# Patient Record
Sex: FEMALE | Race: WHITE | Employment: OTHER | URBAN - METROPOLITAN AREA
[De-identification: names, ages, dates, MRNs, and addresses within clinical notes are randomized per-mention and may not be internally consistent; named-entity substitution may affect disease eponyms.]

---

## 2017-04-12 ENCOUNTER — HOSPITAL ENCOUNTER (OUTPATIENT)
Dept: WOUND CARE | Age: 77
Discharge: HOME | End: 2017-04-12
Payer: MEDICARE

## 2017-04-12 PROCEDURE — 99212 OFFICE O/P EST SF 10 MIN: CPT

## 2017-04-12 PROCEDURE — 29580 STRAPPING UNNA BOOT: CPT

## 2017-04-21 PROCEDURE — 29580 STRAPPING UNNA BOOT: CPT

## 2017-04-28 PROCEDURE — 29580 STRAPPING UNNA BOOT: CPT

## 2017-05-03 ENCOUNTER — HOSPITAL ENCOUNTER (OUTPATIENT)
Dept: WOUND CARE | Age: 77
Discharge: HOME | End: 2017-05-03
Payer: MEDICARE

## 2017-05-03 PROCEDURE — 99212 OFFICE O/P EST SF 10 MIN: CPT

## 2022-03-08 ENCOUNTER — APPOINTMENT (OUTPATIENT)
Dept: GENERAL RADIOLOGY | Age: 82
End: 2022-03-08
Attending: PHYSICIAN ASSISTANT
Payer: MEDICARE

## 2022-03-08 ENCOUNTER — HOSPITAL ENCOUNTER (EMERGENCY)
Age: 82
Discharge: HOME | End: 2022-03-08
Attending: EMERGENCY MEDICINE
Payer: MEDICARE

## 2022-03-08 VITALS
DIASTOLIC BLOOD PRESSURE: 93 MMHG | OXYGEN SATURATION: 95 % | HEIGHT: 67 IN | BODY MASS INDEX: 26.68 KG/M2 | WEIGHT: 170 LBS | SYSTOLIC BLOOD PRESSURE: 129 MMHG | HEART RATE: 120 BPM | TEMPERATURE: 98.1 F | RESPIRATION RATE: 26 BRPM

## 2022-03-08 DIAGNOSIS — I48.91 ATRIAL FIBRILLATION WITH RVR (HCC): Primary | ICD-10-CM

## 2022-03-08 LAB
ALBUMIN SERPL-MCNC: 3 G/DL (ref 3.4–5)
ALBUMIN/GLOB SERPL: 0.9 {RATIO} (ref 0.8–1.7)
ALP SERPL-CCNC: 111 U/L (ref 45–117)
ALT SERPL-CCNC: 69 U/L (ref 13–56)
ANION GAP SERPL CALC-SCNC: 6 MMOL/L (ref 3–18)
APPEARANCE UR: ABNORMAL
AST SERPL-CCNC: 26 U/L (ref 10–38)
BACTERIA URNS QL MICRO: ABNORMAL /HPF
BASOPHILS # BLD: 0 K/UL (ref 0–0.1)
BASOPHILS NFR BLD: 0 % (ref 0–2)
BILIRUB SERPL-MCNC: 0.4 MG/DL (ref 0.2–1)
BILIRUB UR QL: ABNORMAL
BNP SERPL-MCNC: 4252 PG/ML (ref 0–1800)
BUN SERPL-MCNC: 13 MG/DL (ref 7–18)
BUN/CREAT SERPL: 17 (ref 12–20)
CALCIUM SERPL-MCNC: 9.2 MG/DL (ref 8.5–10.1)
CAOX CRY URNS QL MICRO: ABNORMAL
CHLORIDE SERPL-SCNC: 107 MMOL/L (ref 100–111)
CO2 SERPL-SCNC: 27 MMOL/L (ref 21–32)
COLOR UR: ABNORMAL
CREAT SERPL-MCNC: 0.77 MG/DL (ref 0.6–1.3)
DIFFERENTIAL METHOD BLD: ABNORMAL
EOSINOPHIL # BLD: 0.2 K/UL (ref 0–0.4)
EOSINOPHIL NFR BLD: 2 % (ref 0–5)
EPITH CASTS URNS QL MICRO: ABNORMAL /LPF (ref 0–5)
ERYTHROCYTE [DISTWIDTH] IN BLOOD BY AUTOMATED COUNT: 13 % (ref 11.6–14.5)
GLOBULIN SER CALC-MCNC: 3.5 G/DL (ref 2–4)
GLUCOSE SERPL-MCNC: 115 MG/DL (ref 74–99)
GLUCOSE UR STRIP.AUTO-MCNC: NEGATIVE MG/DL
HCT VFR BLD AUTO: 34.9 % (ref 35–45)
HGB BLD-MCNC: 11.1 G/DL (ref 12–16)
HGB UR QL STRIP: NEGATIVE
IMM GRANULOCYTES # BLD AUTO: 0 K/UL (ref 0–0.04)
IMM GRANULOCYTES NFR BLD AUTO: 0 % (ref 0–0.5)
KETONES UR QL STRIP.AUTO: ABNORMAL MG/DL
LEUKOCYTE ESTERASE UR QL STRIP.AUTO: NEGATIVE
LYMPHOCYTES # BLD: 1.4 K/UL (ref 0.9–3.6)
LYMPHOCYTES NFR BLD: 15 % (ref 21–52)
MAGNESIUM SERPL-MCNC: 2.1 MG/DL (ref 1.6–2.6)
MCH RBC QN AUTO: 29.4 PG (ref 24–34)
MCHC RBC AUTO-ENTMCNC: 31.8 G/DL (ref 31–37)
MCV RBC AUTO: 92.3 FL (ref 78–100)
MONOCYTES # BLD: 0.6 K/UL (ref 0.05–1.2)
MONOCYTES NFR BLD: 7 % (ref 3–10)
NEUTS SEG # BLD: 7 K/UL (ref 1.8–8)
NEUTS SEG NFR BLD: 75 % (ref 40–73)
NITRITE UR QL STRIP.AUTO: NEGATIVE
NRBC # BLD: 0 K/UL (ref 0–0.01)
NRBC BLD-RTO: 0 PER 100 WBC
PH UR STRIP: 5.5 [PH] (ref 5–8)
PLATELET # BLD AUTO: 423 K/UL (ref 135–420)
PMV BLD AUTO: 9.8 FL (ref 9.2–11.8)
POTASSIUM SERPL-SCNC: 3.9 MMOL/L (ref 3.5–5.5)
PROT SERPL-MCNC: 6.5 G/DL (ref 6.4–8.2)
PROT UR STRIP-MCNC: 30 MG/DL
RBC # BLD AUTO: 3.78 M/UL (ref 4.2–5.3)
RBC #/AREA URNS HPF: ABNORMAL /HPF (ref 0–5)
SODIUM SERPL-SCNC: 140 MMOL/L (ref 136–145)
SP GR UR REFRACTOMETRY: 1.03 (ref 1–1.03)
TROPONIN-HIGH SENSITIVITY: 20 NG/L (ref 0–54)
TROPONIN-HIGH SENSITIVITY: 24 NG/L (ref 0–54)
TROPONIN-HIGH SENSITIVITY: 24 NG/L (ref 0–54)
TSH SERPL DL<=0.05 MIU/L-ACNC: 2.19 UIU/ML (ref 0.36–3.74)
UROBILINOGEN UR QL STRIP.AUTO: 1 EU/DL (ref 0.2–1)
WBC # BLD AUTO: 9.3 K/UL (ref 4.6–13.2)
WBC URNS QL MICRO: ABNORMAL /HPF (ref 0–5)

## 2022-03-08 PROCEDURE — 85025 COMPLETE CBC W/AUTO DIFF WBC: CPT

## 2022-03-08 PROCEDURE — 99285 EMERGENCY DEPT VISIT HI MDM: CPT

## 2022-03-08 PROCEDURE — 84443 ASSAY THYROID STIM HORMONE: CPT

## 2022-03-08 PROCEDURE — 96374 THER/PROPH/DIAG INJ IV PUSH: CPT

## 2022-03-08 PROCEDURE — 83880 ASSAY OF NATRIURETIC PEPTIDE: CPT

## 2022-03-08 PROCEDURE — 96375 TX/PRO/DX INJ NEW DRUG ADDON: CPT

## 2022-03-08 PROCEDURE — 80053 COMPREHEN METABOLIC PANEL: CPT

## 2022-03-08 PROCEDURE — 93005 ELECTROCARDIOGRAM TRACING: CPT

## 2022-03-08 PROCEDURE — 81001 URINALYSIS AUTO W/SCOPE: CPT

## 2022-03-08 PROCEDURE — 74011250637 HC RX REV CODE- 250/637: Performed by: PHYSICIAN ASSISTANT

## 2022-03-08 PROCEDURE — 84484 ASSAY OF TROPONIN QUANT: CPT

## 2022-03-08 PROCEDURE — 71045 X-RAY EXAM CHEST 1 VIEW: CPT

## 2022-03-08 PROCEDURE — 74011000250 HC RX REV CODE- 250: Performed by: PHYSICIAN ASSISTANT

## 2022-03-08 PROCEDURE — 83735 ASSAY OF MAGNESIUM: CPT

## 2022-03-08 RX ORDER — METOPROLOL TARTRATE 100 MG/1
100 TABLET ORAL 2 TIMES DAILY
Qty: 60 TABLET | Refills: 0 | Status: SHIPPED | OUTPATIENT
Start: 2022-03-08 | End: 2022-04-07

## 2022-03-08 RX ORDER — DILTIAZEM HYDROCHLORIDE 5 MG/ML
10 INJECTION INTRAVENOUS
Status: COMPLETED | OUTPATIENT
Start: 2022-03-08 | End: 2022-03-08

## 2022-03-08 RX ORDER — METOPROLOL TARTRATE 5 MG/5ML
2.5 INJECTION INTRAVENOUS ONCE
Status: COMPLETED | OUTPATIENT
Start: 2022-03-08 | End: 2022-03-08

## 2022-03-08 RX ORDER — METOPROLOL TARTRATE 50 MG/1
100 TABLET ORAL EVERY 12 HOURS
Status: DISCONTINUED | OUTPATIENT
Start: 2022-03-08 | End: 2022-03-08 | Stop reason: HOSPADM

## 2022-03-08 RX ORDER — GUAIFENESIN 100 MG/5ML
81 LIQUID (ML) ORAL DAILY
COMMUNITY

## 2022-03-08 RX ADMIN — APIXABAN 5 MG: 5 TABLET, FILM COATED ORAL at 20:10

## 2022-03-08 RX ADMIN — DILTIAZEM HYDROCHLORIDE 10 MG: 5 INJECTION INTRAVENOUS at 17:22

## 2022-03-08 RX ADMIN — METOPROLOL TARTRATE 100 MG: 50 TABLET, FILM COATED ORAL at 20:03

## 2022-03-08 RX ADMIN — METOPROLOL TARTRATE 2.5 MG: 5 INJECTION INTRAVENOUS at 18:17

## 2022-03-08 NOTE — ED PROVIDER NOTES
EMERGENCY DEPARTMENT HISTORY AND PHYSICAL EXAM    Date: 3/8/2022  Patient Name: Otilia Marshall    History of Presenting Illness     Chief Complaint   Patient presents with    Irregular Heart Beat       History Provided By: Patient and Patient's Daughter    Additional History (Context):   Otilia Marshall is a 78 y.o. female resents to the emergency room with complaints of irregular heartbeat. Patient with a history of recently diagnosed atrial fibrillation with RVR. This was discovered during hospitalization while at [facility] February 26, 2022. She was admitted for elevated troponin, sepsis secondary to urinary tract infection. Patient was discharged home on metoprolol 75 mg twice daily. Aspirin for anticoagulation. She was not placed on anticoagulation due to history of falls. There was some discussion at that time about outpatient watchman device evaluation. Patient is followed by cardiologist Dr. Rick Hamilton. Patient underwent thorough work-up while hospitalized on her heart. Patient denies any chest pain. She does have some shortness of breath mostly noted with exertion. No swelling in her legs. Patient does have a history of thyroid issues. Denies any history of electrolyte imbalances. PCP: Alix Fritz MD    Current Outpatient Medications   Medication Sig Dispense Refill    aspirin 81 mg chewable tablet Take 81 mg by mouth daily.  metoprolol tartrate (LOPRESSOR) 100 mg IR tablet Take 1 Tablet by mouth two (2) times a day for 30 days. 60 Tablet 0    apixaban (Eliquis) 5 mg tablet Take 1 Tablet by mouth two (2) times a day for 30 days. 60 Tablet 0       Past History     Past Medical History:  Past Medical History:   Diagnosis Date    Atrial fibrillation ([address]     Hypothyroidism        Past Surgical History:  Past Surgical History:   Procedure Laterality Date    HX HYSTERECTOMY      HX KNEE REPLACEMENT         Family History:  History reviewed.  No pertinent family history. Social History:  Social History     Tobacco Use    Smoking status: Never Smoker    Smokeless tobacco: Never Used   Substance Use Topics    Alcohol use: Not Currently    Drug use: Never       Allergies: Allergies   Allergen Reactions    Penicillins Hives         Review of Systems   Review of Systems   Constitutional: Negative for chills, fatigue and fever. Respiratory: Positive for shortness of breath. Negative for choking, chest tightness and wheezing. Cardiovascular: Positive for palpitations. Negative for chest pain and leg swelling. Gastrointestinal: Negative for abdominal pain, nausea and vomiting. Neurological: Negative for dizziness, syncope, speech difficulty, weakness, light-headedness, numbness and headaches. All other systems reviewed and are negative. Physical Exam     Vitals:    03/08/22 1735 03/08/22 1740 03/08/22 2003 03/08/22 2015   BP:   133/71 (!) 129/93   Pulse: (!) 118 (!) 106 (!) 109 (!) 120   Resp: 26 23  26   Temp:       SpO2: 92% 92%  95%   Weight:       Height:         Physical Exam  Vitals and nursing note reviewed. Constitutional:       General: She is not in acute distress. Appearance: Normal appearance. She is well-developed, well-groomed and normal weight. She is not ill-appearing or diaphoretic. Comments: Pleasant 73-year-old female no apparent distress. In the room with her daughter. Heart rate 140. Otherwise stable. Cooperative. HENT:      Mouth/Throat:      Mouth: Mucous membranes are moist.      Pharynx: Oropharynx is clear. Eyes:      Extraocular Movements: Extraocular movements intact. Conjunctiva/sclera: Conjunctivae normal.      Pupils: Pupils are equal, round, and reactive to light. Neck:      Thyroid: No thyromegaly. Vascular: No JVD. Trachea: Trachea normal.   Cardiovascular:      Rate and Rhythm: Tachycardia present. Rhythm irregular. Pulses: Normal pulses. Heart sounds: Normal heart sounds. Pulmonary:      Effort: Pulmonary effort is normal.      Breath sounds: Normal breath sounds and air entry. Abdominal:      General: Bowel sounds are normal.      Palpations: Abdomen is soft. Tenderness: There is no abdominal tenderness. Musculoskeletal:         General: No swelling or tenderness. Cervical back: Neck supple. Right lower leg: No edema. Left lower leg: No edema. Skin:     General: Skin is warm and dry. Neurological:      General: No focal deficit present. Mental Status: She is alert and oriented to person, place, and time. Psychiatric:         Mood and Affect: Mood normal.         Behavior: Behavior normal. Behavior is cooperative. Nursing note and vitals reviewed         Diagnostic Study Results     Labs -     Recent Results (from the past 12 hour(s))   CBC WITH AUTOMATED DIFF    Collection Time: 03/08/22  5:04 PM   Result Value Ref Range    WBC 9.3 4.6 - 13.2 K/uL    RBC 3.78 (L) 4.20 - 5.30 M/uL    HGB 11.1 (L) 12.0 - 16.0 g/dL    HCT 34.9 (L) 35.0 - 45.0 %    MCV 92.3 78.0 - 100.0 FL    MCH 29.4 24.0 - 34.0 PG    MCHC 31.8 31.0 - 37.0 g/dL    RDW 13.0 11.6 - 14.5 %    PLATELET 139 (H) 277 - 420 K/uL    MPV 9.8 9.2 - 11.8 FL    NRBC 0.0 0  WBC    ABSOLUTE NRBC 0.00 0.00 - 0.01 K/uL    NEUTROPHILS 75 (H) 40 - 73 %    LYMPHOCYTES 15 (L) 21 - 52 %    MONOCYTES 7 3 - 10 %    EOSINOPHILS 2 0 - 5 %    BASOPHILS 0 0 - 2 %    IMMATURE GRANULOCYTES 0 0.0 - 0.5 %    ABS. NEUTROPHILS 7.0 1.8 - 8.0 K/UL    ABS. LYMPHOCYTES 1.4 0.9 - 3.6 K/UL    ABS. MONOCYTES 0.6 0.05 - 1.2 K/UL    ABS. EOSINOPHILS 0.2 0.0 - 0.4 K/UL    ABS. BASOPHILS 0.0 0.0 - 0.1 K/UL    ABS. IMM.  GRANS. 0.0 0.00 - 0.04 K/UL    DF AUTOMATED     EKG, 12 LEAD, INITIAL    Collection Time: 03/08/22  5:07 PM   Result Value Ref Range    Ventricular Rate 142 BPM    QRS Duration 86 ms    Q-T Interval 292 ms    QTC Calculation (Bezet) 449 ms    Calculated R Axis -27 degrees    Calculated T Axis 33 degrees    Diagnosis        Critical Test Result: High HR  Atrial fibrillation with rapid ventricular response  Low voltage QRS  Cannot rule out Anterior infarct , age undetermined  Abnormal ECG  When compared with ECG of 23-APR-2013 13:55,  Atrial fibrillation has replaced Sinus rhythm  Vent. rate has increased BY  85 BPM  Nonspecific T wave abnormality now evident in Anterolateral leads         Radiologic Studies   XR CHEST PORT   Final Result      Bibasilar pulmonary consolidation, worse on the left, likely with tiny bilateral   pleural effusions. Differential includes pneumonia and aspiration. CT Results  (Last 48 hours)    None        CXR Results  (Last 48 hours)    None            Medical Decision Making   I am the first provider for this patient. I reviewed the vital signs, available nursing notes, past medical history, past surgical history, family history and social history. Vital Signs-Reviewed the patient's vital signs. Pulse Oximetry Analysis 95% on room air    Records Reviewed: Nursing Notes, Old Medical Records, Previous Radiology Studies and Previous Laboratory Studies    DDX:  Atrial fibrillation with RVR    Procedures:  Procedures    ED Course:   Initial assessment performed. The patients presenting problems have been discussed, and they are in agreement with the care plan formulated and outlined with them. I have encouraged them to ask questions as they arise throughout their visit. ED Physician Discussion Note:   Upon presentation, immediately called patient's cardiologist for consultation. His partner, Dr. Jaden Davison, on call. Recommended trying Cardizem 10 mg push. If that does not control it, give patient low-dose IV metoprolol. Patient was kept on a monitor while she was here. Following infusion of Cardizem 10 mg, heart rate did come down from the 140 range into the 120s but would still bounce up to as high as 130. Low-dose metoprolol 2.5 mg IV was then given.   Heart rate responded to wear her heart rate was going down between 98 and 110. Patient was feeling better although still mildly short of breath with any exertion. Chemistries were negative. Troponin initially 24. BNP 4252. Again placed a phone call to cardiologistMelisa Ma of patient's response to the medication. He asked that we give the patient a dose of metoprolol 100 mg p.o. now. Increase her metoprolol to 100 mg twice daily. Have patient follow-up in the office. Patient was also initiated on blood thinner. Discussed pros and cons of blood thinning agents. Patient will be initiated on Eliquis. Repeat troponin no changes. Discharge home to return if any worsening issues. Diagnosis and Disposition       DISCHARGE NOTE:  Seven Peña  results have been reviewed with her. She has been counseled regarding her diagnosis, treatment, and plan. She verbally conveys understanding and agreement of the signs, symptoms, diagnosis, treatment and prognosis and additionally agrees to follow up as discussed. She also agrees with the care-plan and conveys that all of her questions have been answered. I have also provided discharge instructions for her that include: educational information regarding their diagnosis and treatment, and list of reasons why they would want to return to the ED prior to their follow-up appointment, should her condition change. She has been provided with education for proper emergency department utilization. CLINICAL IMPRESSION:    1. Atrial fibrillation with RVR ([facility] [address]        PLAN:  1. D/C Home  2. Discharge Medication List as of 3/8/2022  8:10 PM      START taking these medications    Details   metoprolol tartrate (LOPRESSOR) 100 mg IR tablet Take 1 Tablet by mouth two (2) times a day for 30 days. , Print, Disp-60 Tablet, R-0      apixaban (Eliquis) 5 mg tablet Take 1 Tablet by mouth two (2) times a day for 30 days. , Print, Disp-60 Tablet, R-0         CONTINUE these medications which have NOT CHANGED    Details   aspirin 81 mg chewable tablet Take 81 mg by mouth daily. , Historical Med           3. Follow-up Information     Follow up With Specialties Details Why Contact Info    Lela oLco MD Family Medicine Call  Notify your PCP of today's ER visit and for close follow-up [address]  [phone]      Armen Coreas MD Cardiology, Internal Medicine Go to  This week to discuss atrial fibrillation treatment and follow-up [facility]  [phone]          ____________________________________     Please note that this dictation was completed with Soniq[employer], the computer voice recognition software. Quite often unanticipated grammatical, syntax, homophones, and other interpretive errors are inadvertently transcribed by the computer software. Please disregard these errors. Please excuse any errors that have escaped final proofreading.

## 2022-03-09 NOTE — DISCHARGE INSTRUCTIONS
Increase metoprolol tartrate 200 mg twice daily  Add in Eliquis 5 mg twice daily  Fall precautions  Any worsening issues return to ER immediately

## 2022-03-11 LAB
CALCULATED R AXIS, ECG10: -27 DEGREES
CALCULATED T AXIS, ECG11: 33 DEGREES
DIAGNOSIS, 93000: NORMAL
Q-T INTERVAL, ECG07: 292 MS
QRS DURATION, ECG06: 86 MS
QTC CALCULATION (BEZET), ECG08: 449 MS
VENTRICULAR RATE, ECG03: 142 BPM

## 2023-11-25 ENCOUNTER — APPOINTMENT (OUTPATIENT)
Facility: HOSPITAL | Age: 83
DRG: 193 | End: 2023-11-25
Payer: MEDICARE

## 2023-11-25 ENCOUNTER — HOSPITAL ENCOUNTER (INPATIENT)
Facility: HOSPITAL | Age: 83
LOS: 11 days | Discharge: NURSING, REHAB OR LONG-TERM CARE | DRG: 193 | End: 2023-12-06
Attending: EMERGENCY MEDICINE | Admitting: FAMILY MEDICINE
Payer: MEDICARE

## 2023-11-25 DIAGNOSIS — R65.10 SIRS (SYSTEMIC INFLAMMATORY RESPONSE SYNDROME) (HCC): ICD-10-CM

## 2023-11-25 DIAGNOSIS — J96.01 ACUTE RESPIRATORY FAILURE WITH HYPOXIA (HCC): Primary | ICD-10-CM

## 2023-11-25 DIAGNOSIS — I48.91 ATRIAL FIBRILLATION WITH RVR (HCC): ICD-10-CM

## 2023-11-25 DIAGNOSIS — I50.9 ACUTE ON CHRONIC CONGESTIVE HEART FAILURE, UNSPECIFIED HEART FAILURE TYPE (HCC): ICD-10-CM

## 2023-11-25 PROBLEM — F03.90 DEMENTIA (HCC): Status: ACTIVE | Noted: 2023-11-25

## 2023-11-25 PROBLEM — I48.20 CHRONIC ATRIAL FIBRILLATION (HCC): Status: ACTIVE | Noted: 2023-11-25

## 2023-11-25 PROBLEM — J18.9 BILATERAL PNEUMONIA: Status: ACTIVE | Noted: 2023-11-25

## 2023-11-25 PROBLEM — N39.0 UTI (URINARY TRACT INFECTION): Status: ACTIVE | Noted: 2023-11-25

## 2023-11-25 PROBLEM — I27.20 PULMONARY HYPERTENSION (HCC): Status: ACTIVE | Noted: 2023-11-25

## 2023-11-25 LAB
ALBUMIN SERPL-MCNC: 3.4 G/DL (ref 3.4–5)
ALBUMIN/GLOB SERPL: 0.9 (ref 0.8–1.7)
ALP SERPL-CCNC: 129 U/L (ref 45–117)
ALT SERPL-CCNC: 63 U/L (ref 13–56)
ANION GAP SERPL CALC-SCNC: 5 MMOL/L (ref 3–18)
APPEARANCE UR: ABNORMAL
APTT PPP: 77.2 SEC (ref 23–36.4)
ARTERIAL PATENCY WRIST A: POSITIVE
AST SERPL-CCNC: 31 U/L (ref 10–38)
BACTERIA URNS QL MICRO: ABNORMAL /HPF
BASE EXCESS BLD CALC-SCNC: 5.8 MMOL/L
BASOPHILS # BLD: 0 K/UL (ref 0–0.1)
BASOPHILS NFR BLD: 0 % (ref 0–2)
BDY SITE: ABNORMAL
BILIRUB SERPL-MCNC: 1.2 MG/DL (ref 0.2–1)
BILIRUB UR QL: NEGATIVE
BUN SERPL-MCNC: 21 MG/DL (ref 7–18)
BUN/CREAT SERPL: 21 (ref 12–20)
CALCIUM SERPL-MCNC: 8.9 MG/DL (ref 8.5–10.1)
CAOX CRY URNS QL MICRO: ABNORMAL
CHLORIDE SERPL-SCNC: 98 MMOL/L (ref 100–111)
CO2 SERPL-SCNC: 33 MMOL/L (ref 21–32)
COLOR UR: YELLOW
CREAT SERPL-MCNC: 1 MG/DL (ref 0.6–1.3)
DIFFERENTIAL METHOD BLD: ABNORMAL
EOSINOPHIL # BLD: 0.2 K/UL (ref 0–0.4)
EOSINOPHIL NFR BLD: 1 % (ref 0–5)
EPITH CASTS URNS QL MICRO: ABNORMAL /LPF (ref 0–5)
ERYTHROCYTE [DISTWIDTH] IN BLOOD BY AUTOMATED COUNT: 14 % (ref 11.6–14.5)
FLUAV RNA SPEC QL NAA+PROBE: NOT DETECTED
FLUBV RNA SPEC QL NAA+PROBE: NOT DETECTED
GAS FLOW.O2 O2 DELIVERY SYS: ABNORMAL
GAS FLOW.O2 SETTING OXYMISER: 14 BPM
GLOBULIN SER CALC-MCNC: 3.8 G/DL (ref 2–4)
GLUCOSE SERPL-MCNC: 109 MG/DL (ref 74–99)
GLUCOSE UR STRIP.AUTO-MCNC: NEGATIVE MG/DL
HCO3 BLD-SCNC: 30.1 MMOL/L (ref 22–26)
HCT VFR BLD AUTO: 43.9 % (ref 35–45)
HGB BLD-MCNC: 15.1 G/DL (ref 12–16)
HGB UR QL STRIP: ABNORMAL
IMM GRANULOCYTES # BLD AUTO: 0.1 K/UL (ref 0–0.04)
IMM GRANULOCYTES NFR BLD AUTO: 1 % (ref 0–0.5)
INR PPP: 1.4 (ref 0.9–1.1)
KETONES UR QL STRIP.AUTO: NEGATIVE MG/DL
L PNEUMO AG UR QL IA: NEGATIVE
LACTATE BLD-SCNC: 1.23 MMOL/L (ref 0.4–2)
LEUKOCYTE ESTERASE UR QL STRIP.AUTO: ABNORMAL
LYMPHOCYTES # BLD: 0.7 K/UL (ref 0.9–3.6)
LYMPHOCYTES NFR BLD: 5 % (ref 21–52)
MAGNESIUM SERPL-MCNC: 2.3 MG/DL (ref 1.6–2.6)
MCH RBC QN AUTO: 32.2 PG (ref 24–34)
MCHC RBC AUTO-ENTMCNC: 34.4 G/DL (ref 31–37)
MCV RBC AUTO: 93.6 FL (ref 78–100)
MONOCYTES # BLD: 0.6 K/UL (ref 0.05–1.2)
MONOCYTES NFR BLD: 5 % (ref 3–10)
NEUTS SEG # BLD: 11.4 K/UL (ref 1.8–8)
NEUTS SEG NFR BLD: 88 % (ref 40–73)
NITRITE UR QL STRIP.AUTO: POSITIVE
NRBC # BLD: 0 K/UL (ref 0–0.01)
NRBC BLD-RTO: 0 PER 100 WBC
NT PRO BNP: 1609 PG/ML (ref 0–1800)
O2/TOTAL GAS SETTING VFR VENT: 60 %
PCO2 BLD: 41.5 MMHG (ref 35–45)
PEEP RESPIRATORY: 8 CMH2O
PH BLD: 7.47 (ref 7.35–7.45)
PH UR STRIP: 6 (ref 5–8)
PLATELET # BLD AUTO: 323 K/UL (ref 135–420)
PMV BLD AUTO: 9.5 FL (ref 9.2–11.8)
PO2 BLD: 78 MMHG (ref 80–100)
POTASSIUM SERPL-SCNC: 3.5 MMOL/L (ref 3.5–5.5)
PRESSURE SUPPORT SETTING VENT: 14 CMH2O
PROT SERPL-MCNC: 7.2 G/DL (ref 6.4–8.2)
PROT UR STRIP-MCNC: NEGATIVE MG/DL
PROTHROMBIN TIME: 17.6 SEC (ref 11.9–14.7)
RBC # BLD AUTO: 4.69 M/UL (ref 4.2–5.3)
RBC #/AREA URNS HPF: ABNORMAL /HPF (ref 0–5)
S PNEUM AG UR QL: NEGATIVE
SAO2 % BLD: 96.1 % (ref 92–97)
SARS-COV-2 RNA RESP QL NAA+PROBE: NOT DETECTED
SERVICE CMNT-IMP: ABNORMAL
SODIUM SERPL-SCNC: 136 MMOL/L (ref 136–145)
SP GR UR REFRACTOMETRY: 1.02 (ref 1–1.03)
SPECIMEN TYPE: ABNORMAL
TROPONIN I SERPL HS-MCNC: 15 NG/L (ref 0–54)
TROPONIN I SERPL HS-MCNC: 17 NG/L (ref 0–54)
TROPONIN I SERPL HS-MCNC: 18 NG/L (ref 0–54)
UROBILINOGEN UR QL STRIP.AUTO: 0.2 EU/DL (ref 0.2–1)
VENTILATION MODE VENT: ABNORMAL
WBC # BLD AUTO: 13 K/UL (ref 4.6–13.2)
WBC URNS QL MICRO: ABNORMAL /HPF (ref 0–5)

## 2023-11-25 PROCEDURE — 87636 SARSCOV2 & INF A&B AMP PRB: CPT

## 2023-11-25 PROCEDURE — 6360000002 HC RX W HCPCS: Performed by: EMERGENCY MEDICINE

## 2023-11-25 PROCEDURE — 87449 NOS EACH ORGANISM AG IA: CPT

## 2023-11-25 PROCEDURE — 96366 THER/PROPH/DIAG IV INF ADDON: CPT

## 2023-11-25 PROCEDURE — 81001 URINALYSIS AUTO W/SCOPE: CPT

## 2023-11-25 PROCEDURE — 80053 COMPREHEN METABOLIC PANEL: CPT

## 2023-11-25 PROCEDURE — 87205 SMEAR GRAM STAIN: CPT

## 2023-11-25 PROCEDURE — 87070 CULTURE OTHR SPECIMN AEROBIC: CPT

## 2023-11-25 PROCEDURE — 6360000002 HC RX W HCPCS: Performed by: INTERNAL MEDICINE

## 2023-11-25 PROCEDURE — 96367 TX/PROPH/DG ADDL SEQ IV INF: CPT

## 2023-11-25 PROCEDURE — 83605 ASSAY OF LACTIC ACID: CPT

## 2023-11-25 PROCEDURE — 6360000002 HC RX W HCPCS: Performed by: FAMILY MEDICINE

## 2023-11-25 PROCEDURE — 84484 ASSAY OF TROPONIN QUANT: CPT

## 2023-11-25 PROCEDURE — 2580000003 HC RX 258: Performed by: FAMILY MEDICINE

## 2023-11-25 PROCEDURE — 83735 ASSAY OF MAGNESIUM: CPT

## 2023-11-25 PROCEDURE — 96365 THER/PROPH/DIAG IV INF INIT: CPT

## 2023-11-25 PROCEDURE — 71045 X-RAY EXAM CHEST 1 VIEW: CPT

## 2023-11-25 PROCEDURE — 2700000000 HC OXYGEN THERAPY PER DAY

## 2023-11-25 PROCEDURE — 99285 EMERGENCY DEPT VISIT HI MDM: CPT

## 2023-11-25 PROCEDURE — 85730 THROMBOPLASTIN TIME PARTIAL: CPT

## 2023-11-25 PROCEDURE — 87040 BLOOD CULTURE FOR BACTERIA: CPT

## 2023-11-25 PROCEDURE — 85025 COMPLETE CBC W/AUTO DIFF WBC: CPT

## 2023-11-25 PROCEDURE — 5A09357 ASSISTANCE WITH RESPIRATORY VENTILATION, LESS THAN 24 CONSECUTIVE HOURS, CONTINUOUS POSITIVE AIRWAY PRESSURE: ICD-10-PCS | Performed by: FAMILY MEDICINE

## 2023-11-25 PROCEDURE — 2500000003 HC RX 250 WO HCPCS: Performed by: FAMILY MEDICINE

## 2023-11-25 PROCEDURE — 87086 URINE CULTURE/COLONY COUNT: CPT

## 2023-11-25 PROCEDURE — 94660 CPAP INITIATION&MGMT: CPT

## 2023-11-25 PROCEDURE — 2500000003 HC RX 250 WO HCPCS: Performed by: INTERNAL MEDICINE

## 2023-11-25 PROCEDURE — 36600 WITHDRAWAL OF ARTERIAL BLOOD: CPT

## 2023-11-25 PROCEDURE — 36415 COLL VENOUS BLD VENIPUNCTURE: CPT

## 2023-11-25 PROCEDURE — 6370000000 HC RX 637 (ALT 250 FOR IP): Performed by: FAMILY MEDICINE

## 2023-11-25 PROCEDURE — 71275 CT ANGIOGRAPHY CHEST: CPT

## 2023-11-25 PROCEDURE — 6360000004 HC RX CONTRAST MEDICATION: Performed by: EMERGENCY MEDICINE

## 2023-11-25 PROCEDURE — 1100000003 HC PRIVATE W/ TELEMETRY

## 2023-11-25 PROCEDURE — 96375 TX/PRO/DX INJ NEW DRUG ADDON: CPT

## 2023-11-25 PROCEDURE — 82803 BLOOD GASES ANY COMBINATION: CPT

## 2023-11-25 PROCEDURE — 87186 SC STD MICRODIL/AGAR DIL: CPT

## 2023-11-25 PROCEDURE — 85610 PROTHROMBIN TIME: CPT

## 2023-11-25 PROCEDURE — 2580000003 HC RX 258: Performed by: EMERGENCY MEDICINE

## 2023-11-25 PROCEDURE — 94640 AIRWAY INHALATION TREATMENT: CPT

## 2023-11-25 PROCEDURE — A4216 STERILE WATER/SALINE, 10 ML: HCPCS | Performed by: FAMILY MEDICINE

## 2023-11-25 PROCEDURE — 87077 CULTURE AEROBIC IDENTIFY: CPT

## 2023-11-25 PROCEDURE — C9113 INJ PANTOPRAZOLE SODIUM, VIA: HCPCS | Performed by: FAMILY MEDICINE

## 2023-11-25 PROCEDURE — 93005 ELECTROCARDIOGRAM TRACING: CPT | Performed by: EMERGENCY MEDICINE

## 2023-11-25 PROCEDURE — 83880 ASSAY OF NATRIURETIC PEPTIDE: CPT

## 2023-11-25 PROCEDURE — 2580000003 HC RX 258: Performed by: INTERNAL MEDICINE

## 2023-11-25 RX ORDER — OMEPRAZOLE 20 MG/1
CAPSULE, DELAYED RELEASE ORAL
Status: ON HOLD | COMMUNITY
Start: 2022-06-01 | End: 2023-12-06 | Stop reason: HOSPADM

## 2023-11-25 RX ORDER — POTASSIUM CHLORIDE 29.8 MG/ML
20 INJECTION INTRAVENOUS PRN
Status: DISCONTINUED | OUTPATIENT
Start: 2023-11-25 | End: 2023-12-06 | Stop reason: HOSPADM

## 2023-11-25 RX ORDER — METOPROLOL TARTRATE 1 MG/ML
2.5 INJECTION, SOLUTION INTRAVENOUS ONCE
Status: COMPLETED | OUTPATIENT
Start: 2023-11-25 | End: 2023-11-25

## 2023-11-25 RX ORDER — POTASSIUM CHLORIDE 1500 MG/1
TABLET, EXTENDED RELEASE ORAL
Status: ON HOLD | COMMUNITY
Start: 2022-06-01 | End: 2023-12-06 | Stop reason: HOSPADM

## 2023-11-25 RX ORDER — LEVOTHYROXINE SODIUM 50 UG/1
CAPSULE ORAL
COMMUNITY
Start: 2022-06-01

## 2023-11-25 RX ORDER — ONDANSETRON 2 MG/ML
4 INJECTION INTRAMUSCULAR; INTRAVENOUS EVERY 6 HOURS PRN
Status: DISCONTINUED | OUTPATIENT
Start: 2023-11-25 | End: 2023-12-06 | Stop reason: HOSPADM

## 2023-11-25 RX ORDER — MECOBALAMIN 5000 MCG
5 TABLET,DISINTEGRATING ORAL NIGHTLY
Status: DISCONTINUED | OUTPATIENT
Start: 2023-11-25 | End: 2023-12-06 | Stop reason: HOSPADM

## 2023-11-25 RX ORDER — ALBUTEROL SULFATE 90 UG/1
2 AEROSOL, METERED RESPIRATORY (INHALATION)
COMMUNITY

## 2023-11-25 RX ORDER — SODIUM CHLORIDE 0.9 % (FLUSH) 0.9 %
5-40 SYRINGE (ML) INJECTION PRN
Status: DISCONTINUED | OUTPATIENT
Start: 2023-11-25 | End: 2023-12-06 | Stop reason: HOSPADM

## 2023-11-25 RX ORDER — IPRATROPIUM BROMIDE AND ALBUTEROL SULFATE 2.5; .5 MG/3ML; MG/3ML
1 SOLUTION RESPIRATORY (INHALATION) EVERY 4 HOURS PRN
Status: DISCONTINUED | OUTPATIENT
Start: 2023-11-25 | End: 2023-12-06 | Stop reason: HOSPADM

## 2023-11-25 RX ORDER — POTASSIUM CHLORIDE 7.45 MG/ML
10 INJECTION INTRAVENOUS PRN
Status: DISCONTINUED | OUTPATIENT
Start: 2023-11-25 | End: 2023-12-06 | Stop reason: HOSPADM

## 2023-11-25 RX ORDER — SENNOSIDES 8.8 MG/5ML
5 LIQUID ORAL 2 TIMES DAILY PRN
Status: DISCONTINUED | OUTPATIENT
Start: 2023-11-25 | End: 2023-12-06 | Stop reason: HOSPADM

## 2023-11-25 RX ORDER — METOPROLOL TARTRATE 50 MG/1
TABLET, FILM COATED ORAL
Status: ON HOLD | COMMUNITY
Start: 2022-06-01 | End: 2023-11-30

## 2023-11-25 RX ORDER — FLUTICASONE PROPIONATE 50 MCG
1 SPRAY, SUSPENSION (ML) NASAL DAILY
Status: ON HOLD | COMMUNITY
Start: 2023-11-22 | End: 2023-12-06 | Stop reason: HOSPADM

## 2023-11-25 RX ORDER — BENZONATATE 100 MG/1
100 CAPSULE ORAL
Status: ON HOLD | COMMUNITY
Start: 2023-11-22 | End: 2023-12-06 | Stop reason: HOSPADM

## 2023-11-25 RX ORDER — SODIUM CHLORIDE 0.9 % (FLUSH) 0.9 %
5-40 SYRINGE (ML) INJECTION EVERY 12 HOURS SCHEDULED
Status: DISCONTINUED | OUTPATIENT
Start: 2023-11-25 | End: 2023-12-06 | Stop reason: HOSPADM

## 2023-11-25 RX ORDER — POTASSIUM CHLORIDE 750 MG/1
20 TABLET, EXTENDED RELEASE ORAL DAILY
COMMUNITY

## 2023-11-25 RX ORDER — MAGNESIUM SULFATE IN WATER 40 MG/ML
2000 INJECTION, SOLUTION INTRAVENOUS PRN
Status: DISCONTINUED | OUTPATIENT
Start: 2023-11-25 | End: 2023-12-06 | Stop reason: HOSPADM

## 2023-11-25 RX ORDER — MAGNESIUM OXIDE 400 MG/1
400 TABLET ORAL DAILY
COMMUNITY
Start: 2022-06-23

## 2023-11-25 RX ORDER — FUROSEMIDE 20 MG/1
TABLET ORAL
COMMUNITY
Start: 2022-05-22

## 2023-11-25 RX ORDER — BUDESONIDE 0.5 MG/2ML
0.5 INHALANT ORAL
Status: DISCONTINUED | OUTPATIENT
Start: 2023-11-25 | End: 2023-12-05

## 2023-11-25 RX ORDER — GUAIFENESIN 200 MG/10ML
200 LIQUID ORAL EVERY 6 HOURS PRN
Status: DISCONTINUED | OUTPATIENT
Start: 2023-11-25 | End: 2023-12-06 | Stop reason: HOSPADM

## 2023-11-25 RX ORDER — LOPERAMIDE HYDROCHLORIDE 2 MG/1
TABLET ORAL
Status: ON HOLD | COMMUNITY
Start: 2022-06-01 | End: 2023-12-06 | Stop reason: HOSPADM

## 2023-11-25 RX ORDER — ACETAMINOPHEN 325 MG/1
650 TABLET ORAL EVERY 6 HOURS PRN
Status: DISCONTINUED | OUTPATIENT
Start: 2023-11-25 | End: 2023-12-06 | Stop reason: HOSPADM

## 2023-11-25 RX ORDER — DILTIAZEM HYDROCHLORIDE 180 MG/1
CAPSULE, COATED, EXTENDED RELEASE ORAL
Status: ON HOLD | COMMUNITY
Start: 2022-06-01 | End: 2023-12-06 | Stop reason: HOSPADM

## 2023-11-25 RX ORDER — PREDNISONE 10 MG/1
TABLET ORAL
Status: ON HOLD | COMMUNITY
Start: 2023-11-22 | End: 2023-12-06 | Stop reason: HOSPADM

## 2023-11-25 RX ORDER — ONDANSETRON 4 MG/1
4 TABLET, ORALLY DISINTEGRATING ORAL EVERY 8 HOURS PRN
Status: DISCONTINUED | OUTPATIENT
Start: 2023-11-25 | End: 2023-12-06 | Stop reason: HOSPADM

## 2023-11-25 RX ORDER — FERROUS SULFATE 325(65) MG
325 TABLET ORAL DAILY
COMMUNITY
Start: 2022-06-23

## 2023-11-25 RX ORDER — ACETAMINOPHEN 650 MG/1
650 SUPPOSITORY RECTAL EVERY 6 HOURS PRN
Status: DISCONTINUED | OUTPATIENT
Start: 2023-11-25 | End: 2023-12-06 | Stop reason: HOSPADM

## 2023-11-25 RX ORDER — OMEPRAZOLE 20 MG/1
20 CAPSULE, DELAYED RELEASE ORAL
COMMUNITY
Start: 2022-03-19

## 2023-11-25 RX ORDER — METOPROLOL TARTRATE 1 MG/ML
2.5 INJECTION, SOLUTION INTRAVENOUS EVERY 6 HOURS
Status: DISCONTINUED | OUTPATIENT
Start: 2023-11-25 | End: 2023-11-26

## 2023-11-25 RX ORDER — ACETAMINOPHEN 325 MG/1
650 TABLET ORAL EVERY 4 HOURS PRN
COMMUNITY

## 2023-11-25 RX ORDER — SODIUM CHLORIDE 9 MG/ML
INJECTION, SOLUTION INTRAVENOUS PRN
Status: DISCONTINUED | OUTPATIENT
Start: 2023-11-25 | End: 2023-12-06 | Stop reason: HOSPADM

## 2023-11-25 RX ORDER — AZELASTINE 1 MG/ML
2 SPRAY, METERED NASAL 2 TIMES DAILY
Status: ON HOLD | COMMUNITY
Start: 2023-11-22 | End: 2023-12-06 | Stop reason: HOSPADM

## 2023-11-25 RX ORDER — ENOXAPARIN SODIUM 100 MG/ML
40 INJECTION SUBCUTANEOUS DAILY
Status: DISCONTINUED | OUTPATIENT
Start: 2023-11-25 | End: 2023-11-26

## 2023-11-25 RX ADMIN — BUDESONIDE INHALATION 500 MCG: 0.5 SUSPENSION RESPIRATORY (INHALATION) at 12:50

## 2023-11-25 RX ADMIN — BUDESONIDE INHALATION 500 MCG: 0.5 SUSPENSION RESPIRATORY (INHALATION) at 21:17

## 2023-11-25 RX ADMIN — IPRATROPIUM BROMIDE 0.5 MG: 0.5 SOLUTION RESPIRATORY (INHALATION) at 21:17

## 2023-11-25 RX ADMIN — CEFEPIME 2000 MG: 2 INJECTION, POWDER, FOR SOLUTION INTRAVENOUS at 07:49

## 2023-11-25 RX ADMIN — IOPAMIDOL 75 ML: 755 INJECTION, SOLUTION INTRAVENOUS at 10:45

## 2023-11-25 RX ADMIN — METOPROLOL TARTRATE 2.5 MG: 5 INJECTION, SOLUTION INTRAVENOUS at 12:24

## 2023-11-25 RX ADMIN — ENOXAPARIN SODIUM 40 MG: 100 INJECTION SUBCUTANEOUS at 12:50

## 2023-11-25 RX ADMIN — PANTOPRAZOLE SODIUM 40 MG: 40 INJECTION, POWDER, FOR SOLUTION INTRAVENOUS at 12:50

## 2023-11-25 RX ADMIN — METOPROLOL TARTRATE 2.5 MG: 5 INJECTION INTRAVENOUS at 18:32

## 2023-11-25 RX ADMIN — VANCOMYCIN HYDROCHLORIDE 1000 MG: 1 INJECTION, POWDER, LYOPHILIZED, FOR SOLUTION INTRAVENOUS at 09:29

## 2023-11-25 RX ADMIN — VANCOMYCIN HYDROCHLORIDE 1000 MG: 1 INJECTION, POWDER, LYOPHILIZED, FOR SOLUTION INTRAVENOUS at 08:23

## 2023-11-25 RX ADMIN — Medication 5 MG: at 20:51

## 2023-11-25 RX ADMIN — CEFEPIME 2000 MG: 2 INJECTION, POWDER, FOR SOLUTION INTRAVENOUS at 18:32

## 2023-11-25 RX ADMIN — IPRATROPIUM BROMIDE 0.5 MG: 0.5 SOLUTION RESPIRATORY (INHALATION) at 16:16

## 2023-11-25 NOTE — CONSULTS
[facility]  Pulmonary, Critical Care, and Sleep Medicine    Name: Omid Hall MRN: [id number]   : 1940 Hospital: [facility]    Date: 2023  Room: [address] Note                                                                             Consult requesting physician: Luisito Sequeira MD  Reason for Consult: Acute respiratory failure    IMPRESSION:     Acute respiratory failure with hypoxia  Bilateral pneumonia-treat as HCAP  Chronic atrial fibrillation  Chronic heart failure    Patient Active Problem List   Diagnosis Code    Acute hypoxemic respiratory failure (HCC) J96.01    Bilateral pneumonia J18.9    Chronic atrial fibrillation (HCC) I48.20    Acute on chronic congestive heart failure ([address]) I50.9    Dementia ([address]) F03.90       Code status: Full Code       RECOMMENDATIONS:     Respiratory: Patient appears to have combination of pneumonia and CHF, with history of pulm hypertension. Respirations improved; patient off BiPAP. ABG reviewed-stable ventilation; adequate oxygenation on 60% FiO2. Patient on nasal cannula oxygen-6 L currently; encouraged incentive spirometry. Avoid LABA due to A-fib; bronchodilators-budesonide and ipratropium nebs; currently not needing IV steroids. CT chest images reviewed-right-sided multilobar pneumonia with right lower lobe consolidation; small right basal effusion; no central or segmental PEs reported; cardiomegaly with pulmonary hypertension findings. Keep SPO2 >=92%. HOB 30 degree elevation all the time. Aggressive pulmonary toileting. Aspiration precautions. Incentive spirometry. CVS: Stable hemodynamics; telemetry-atrial fibrillation. Echo done recently-normal EF; mild LVH; mitral valve regurgitation; mild to moderate pulm hypertension. proBNP elevated. Troponins-not elevated. Beta-blocker-scheduled IV metoprolol. Anticoagulation-oral eliquis versus sc enoxaparin.     ID: SARS-CoV-2 and influenza PCR NT Pro-BNP 1,609 0 - 1,800 PG/ML   Troponin    Collection Time: 11/25/23  6:48 AM   Result Value Ref Range    Troponin, High Sensitivity 17 0 - 54 ng/L   POCT lactic acid (lactate)    Collection Time: 11/25/23  7:08 AM   Result Value Ref Range    POC Lactic Acid 1.23 0.40 - 2.00 mmol/L   Urinalysis    Collection Time: 11/25/23  7:40 AM   Result Value Ref Range    Color, UA YELLOW      Appearance CLOUDY      Specific Gravity, UA 1.022 1.005 - 1.030      pH, Urine 6.0 5.0 - 8.0      Protein, UA Negative NEG mg/dL    Glucose, UA Negative NEG mg/dL    Ketones, Urine Negative NEG mg/dL    Bilirubin Urine Negative NEG      Blood, Urine MODERATE (A) NEG      Urobilinogen, Urine 0.2 0.2 - 1.0 EU/dL    Nitrite, Urine Positive (A) NEG      Leukocyte Esterase, Urine SMALL (A) NEG     Urinalysis, Micro    Collection Time: 11/25/23  7:40 AM   Result Value Ref Range    WBC, UA 4 to 10 0 - 5 /hpf    RBC, UA 4 to 10 0 - 5 /hpf    Epithelial Cells UA 4+ 0 - 5 /lpf    BACTERIA, URINE 4+ (A) NEG /hpf    Calcium Oxalate FEW (A) NEG     Troponin    Collection Time: 11/25/23  8:14 AM   Result Value Ref Range    Troponin, High Sensitivity 15 0 - 54 ng/L   POC G3: BLOOD GASES INCLUDES CALC. TCO2, HCO3, BASE EXCESS, SO2    Collection Time: 11/25/23  8:58 AM   Result Value Ref Range    DEVICE BIPAP MASK      FIO2 60 %    POC pH 7.47 (H) 7.35 - 7.45      POC pCO2 41.5 35.0 - 45.0 MMHG    POC PO2 78 (L) 80 - 100 MMHG    POC HCO3 30.1 (H) 22 - 26 MMOL/L    POC O2 SAT 96.1 92 - 97 %    Base Excess 5.8 mmol/L    Mode BIVENT      Set Rate 14 bpm    POC PEEP 8 cmH2O    POC Pressure Support 14 cmH2O    POC Corky's Test Positive      Site LEFT RADIAL      Specimen type: ARTERIAL      Performed by:  Rosa Isela Grider    COVID-19 & Influenza Combo    Collection Time: 11/25/23  9:52 AM    Specimen: Nasopharyngeal   Result Value Ref Range    SARS-CoV-2, PCR Not detected NOTD      Rapid Influenza A By PCR Not detected NOTD      Rapid Influenza B By PCR

## 2023-11-25 NOTE — ED NOTES
Spoke to pt's daughter gave her update about pt's health status.       Claude Clement, RN  11/25/23 5955

## 2023-11-25 NOTE — ED PROVIDER NOTES
EMERGENCY DEPARTMENT HISTORY AND PHYSICAL EXAM      Date: 11/25/2023  Patient Name: Nel Jason      History of Presenting Illness     Chief Complaint   Patient presents with    Respiratory Distress     Pt arrives via EMS from [facility] after O2 saturation was in low 80s. Pt was being treated for pneumonia at the time. Pt on 15L NRB with EMS; nosebleed, bleeding controlled, noted from nasal cannula. Lung sounds coarse bilaterally. Pt reports productive cough. Pt denies CP, abdominal pain. Location/Duration/Severity/Modifying factors   Chief Complaint   Patient presents with    Respiratory Distress     Pt arrives via EMS from [facility] after O2 saturation was in low 80s. Pt was being treated for pneumonia at the time. Pt on 15L NRB with EMS; nosebleed, bleeding controlled, noted from nasal cannula. Lung sounds coarse bilaterally. Pt reports productive cough. Pt denies CP, abdominal pain. HPI:  Nel Jason is a 78 y.o. female with PMH significant for recent hospitalization for pneumonia, currently at [facility] rehab facility, chronic atrial fibrillation, congestive heart failure presents with increased oxygen requirement to 6 L tonight, shortness of breath over the past week that progressively worsened, worse with lying flat. Continues to have productive cough. Pt  denies fevers, chest pain or tightness. Not sure if she still taking antibiotic therapy. Presents via EMS on 15 L via nonrebreather.      PCP: Francisca Patino MD    Current Facility-Administered Medications   Medication Dose Route Frequency Provider Last Rate Last Admin    ceFEPIme (MAXIPIME) 2,000 mg in sodium chloride 0.9 % 100 mL IVPB (mini-bag)  2,000 mg IntraVENous Once Tequila De León, DO        vancomycin (VANCOCIN) 1,000 mg in sodium chloride 0.9 % 250 mL (vial-mate) IVPB  1,000 mg IntraVENous Once Tequila De León, DO        And    vancomycin (VANCOCIN) 1,000 mg in sodium chloride 0.9 % 250 mL (vial-mate) IVPB Physician  Claude Cole, [address] [address], [place]  11/25/23 4965

## 2023-11-25 NOTE — PROGRESS NOTES
Patient admitted to unit from ED. Patient is alert and oriented with Daughter at bedside. Reviewed patient data base and home medications with patient and daughter. Daughter has list of current medications patient is taking. Assessed skin, please see LDA. Patient oriented to room and call bell in reach.

## 2023-11-25 NOTE — ED TRIAGE NOTES
Pt arrives via EMS from [facility] after O2 saturation was in low 80s. Pt was being treated for pneumonia at the time. Pt on 15L NRB with EMS. Lung sounds coarse bilaterally. Pt reports productive cough. Pt denies CP, abdominal pain. Hx of Afib.  Nosebleed, bleeding controlled, noted from nasal cannula; bandage on chin also noted, pt reports is from shaving.    +blood thinners

## 2023-11-25 NOTE — PROGRESS NOTES
Patient arrives to ED wearing NRB mask. EMT states that she was on 6-Lpm NC on transport, but changed over due to the patient having a nose bleed. Placed on BiPAP rate 14, 14/+8, 60%. She is wearing a large Full Face Mask.  V-60

## 2023-11-25 NOTE — H&P
History & Physical    Patient: Nel Jason MRN: [id number]  CSN: [id number]    YOB: 1940  Age: 78 y.o. Sex: female      DOA: 11/25/2023  Primary Care Provider:  Fracnisca Patino MD      Assessment/Plan   Nel Jason is a 78 y.o. female with h/o atrial fibrillation, hypothyroidism, anemia of chronic disease,CHF, HTN and HLD recently treated inpateint at another Health system and discharged tto SNF on 11/15/23. Presents today with acute respiratory distress with SOB. Found to be hypoxuic on arrival of EMS. Admitted for acute hypoxemic failure, bilateral pneumonia/HCAP, and possible UTI. CRITICAL CARE PLAN    Resp - acute hypoxemic respiratory failure combined with CHF, was initially on bipap. Weaned to NC, on 6L now  Follow ABG  HOB>30 degrees. Bronchodilators. Follow sputum cx. Daily CXR. Aspiration precuations  No LABA due to AFIb, bronchodilators and inhaled steroids, IV steroids not needed  CT chest obtained: shows multilobar PNA, small pleural effusion, no Pes, cardiomegaly with pulm htn    ID - Multifocal PNA /HCAP treatment, hosp at another facility   Respiratory culture   Follow up blood and urine cx. Legionella and pneumococcal antigen. Lactic acid wnl  Broad-spectrum antibiotics for HCAP Tx  Cefepime and IV vancomycin -allergy to PCN, but pt tolerated cephalosporin well in ED  Influenza A/B and COVID-19 negative   Trend temps, wbc, LA improving. Follow urine culture     CVS - Monitor HD. Wean pressors, levo for MAP>65. Heme/onc - Follow H&H, plts. INR. Renal - Trend BUN, Cr, follow I/O, craig in place. Check and replace Mg, K, phos. Endocrine -  Follow FSG    Neuro -no acute issues, h/o baseline dementia   Daughter advised that she needs her ativan at night,   Concern for given sedative in setting of resp distress   Added continuous pulse oximetry and consider lower dose     Pain -no issues     Sedation - Fentanyl, ativan/versed prn. Sedation bundle. GI - NPO for now.  NG

## 2023-11-25 NOTE — ED NOTES
IV insertion attempted by EMS and 3 RNs; IV access unable to be obtained. RRT called.      Mary Caro, RN  11/25/23 1293

## 2023-11-25 NOTE — ED PROVIDER NOTES
EMERGENCY DEPARTMENT CONTINUING CARE NOTE      THIS NOTE IS DOCUMENTATION OF CARE PROVIDED AFTER CARE OF THE PATIENT WAS TRANSFERRED TO ME. PLEASE SEE THE NOTE BY THE INITIAL ED PROVIDER FOR DETAILS SURROUNDING THE H&P AND INITIAL MEDICAL DECISION MAKING.     Patient Name: Omid Hall  MRN: [id number]  YOB: 1940  Provider: Clive Arellano MD  PCP: Lashawn Fofana MD   Date of transfer of care: 11/25/23    Diagnostic Study Results     LABS:  Recent Results (from the past 12 hour(s))   EKG 12 Lead    Collection Time: 11/25/23  6:16 AM   Result Value Ref Range    Ventricular Rate 107 BPM    QRS Duration 88 ms    Q-T Interval 388 ms    QTc Calculation (Bazett) 517 ms    R Axis -31 degrees    T Axis -84 degrees    Diagnosis       Atrial fibrillation with rapid ventricular response with premature   ventricular or aberrantly conducted complexes  Left axis deviation  Possible Anterior infarct (cited on or before 08-MAR-2022)  Abnormal ECG  When compared with ECG of 08-MAR-2022 17:07,  No significant change was found     CBC with Auto Differential    Collection Time: 11/25/23  6:48 AM   Result Value Ref Range    WBC 13.0 4.6 - 13.2 K/uL    RBC 4.69 4.20 - 5.30 M/uL    Hemoglobin 15.1 12.0 - 16.0 g/dL    Hematocrit 43.9 35.0 - 45.0 %    MCV 93.6 78.0 - 100.0 FL    MCH 32.2 24.0 - 34.0 PG    MCHC 34.4 31.0 - 37.0 g/dL    RDW 14.0 11.6 - 14.5 %    Platelets 029 708 - 414 K/uL    MPV 9.5 9.2 - 11.8 FL    Nucleated RBCs 0.0 0  WBC    nRBC 0.00 0.00 - 0.01 K/uL    Neutrophils % 88 (H) 40 - 73 %    Lymphocytes % 5 (L) 21 - 52 %    Monocytes % 5 3 - 10 %    Eosinophils % 1 0 - 5 %    Basophils % 0 0 - 2 %    Immature Granulocytes 1 (H) 0.0 - 0.5 %    Neutrophils Absolute 11.4 (H) 1.8 - 8.0 K/UL    Lymphocytes Absolute 0.7 (L) 0.9 - 3.6 K/UL    Monocytes Absolute 0.6 0.05 - 1.2 K/UL    Eosinophils Absolute 0.2 0.0 - 0.4 K/UL    Basophils Absolute 0.0 0.0 - 0.1 K/UL    Absolute Immature Granulocyte 0.1 (H) 0.00 - will order Lopressor IV. [JM-2]      ED Course User Index  [JM] Luciano Vanegas,   [JM-2] Andi Xiao MD       Medical Decision Making     DISCUSSION:  This appears to be a severe conditon. This appears to be an acute condition. 78 y.o. female who was recently hospitalized for pneumonia. She is currently at [facility] rehab facility. She has chronic A-fib, CHF and is typically on oxygen at home. She has an increased oxygen requirement today up to 6 L/min which is worsened from her baseline. She was placed on none rebreather in the ambulance and converted to BiPAP in the ED. She additionally has orthopnea. Patient was able to tolerate CT scan of the chest on nonrebreather. She was downgraded initially to nonrebreather and then to nasal cannula per request of pulmonology. Patient's chest x-ray was consistent with CHF although her proBNP was elevated but not markedly so. Her CTA of the chest did not demonstrate any PE but was consistent with a right heart failure. Discussed with hospitalist initially for ICU admission but patient was able to be weaned to nasal cannula so patient will be downgraded to telemetry. I discussed each of these tests and considerations with the patient. They agree with the plan of admission. ADDITIONAL CONSIDERATIONS:  The following Chronic Conditions impacted the patient's care: CHF because patient is likely having a CHF exacerbation at this time. CRITICAL CARE TIME:     I have spent 66 minutes of critical care time involved in lab review, consultations with specialist, family decision-making, and documentation. This time does not include time spent on separately billable procedures. During this entire length of time, I was immediately available to the patient. Critical Care:   The reason for providing this level of medical care for this critically ill patient was due a critical illness that impaired one or more vital organ systems such that there was

## 2023-11-26 ENCOUNTER — APPOINTMENT (OUTPATIENT)
Facility: HOSPITAL | Age: 83
DRG: 193 | End: 2023-11-26
Payer: MEDICARE

## 2023-11-26 PROBLEM — I48.91 ATRIAL FIBRILLATION WITH RVR (HCC): Status: ACTIVE | Noted: 2023-11-26

## 2023-11-26 LAB
ANION GAP SERPL CALC-SCNC: 5 MMOL/L (ref 3–18)
ANION GAP SERPL CALC-SCNC: 6 MMOL/L (ref 3–18)
BASOPHILS # BLD: 0 K/UL (ref 0–0.1)
BASOPHILS NFR BLD: 0 % (ref 0–2)
BUN SERPL-MCNC: 17 MG/DL (ref 7–18)
BUN SERPL-MCNC: 18 MG/DL (ref 7–18)
BUN/CREAT SERPL: 18 (ref 12–20)
BUN/CREAT SERPL: 25 (ref 12–20)
CALCIUM SERPL-MCNC: 8.4 MG/DL (ref 8.5–10.1)
CALCIUM SERPL-MCNC: 8.5 MG/DL (ref 8.5–10.1)
CHLORIDE SERPL-SCNC: 103 MMOL/L (ref 100–111)
CHLORIDE SERPL-SCNC: 104 MMOL/L (ref 100–111)
CO2 SERPL-SCNC: 28 MMOL/L (ref 21–32)
CO2 SERPL-SCNC: 29 MMOL/L (ref 21–32)
CREAT SERPL-MCNC: 0.72 MG/DL (ref 0.6–1.3)
CREAT SERPL-MCNC: 0.95 MG/DL (ref 0.6–1.3)
DIFFERENTIAL METHOD BLD: ABNORMAL
EKG DIAGNOSIS: NORMAL
EKG Q-T INTERVAL: 388 MS
EKG QRS DURATION: 88 MS
EKG QTC CALCULATION (BAZETT): 517 MS
EKG R AXIS: -31 DEGREES
EKG T AXIS: -84 DEGREES
EKG VENTRICULAR RATE: 107 BPM
EOSINOPHIL # BLD: 0.2 K/UL (ref 0–0.4)
EOSINOPHIL NFR BLD: 2 % (ref 0–5)
ERYTHROCYTE [DISTWIDTH] IN BLOOD BY AUTOMATED COUNT: 14.1 % (ref 11.6–14.5)
GLUCOSE SERPL-MCNC: 117 MG/DL (ref 74–99)
GLUCOSE SERPL-MCNC: 143 MG/DL (ref 74–99)
HCT VFR BLD AUTO: 38.4 % (ref 35–45)
HGB BLD-MCNC: 12.6 G/DL (ref 12–16)
IMM GRANULOCYTES # BLD AUTO: 0.1 K/UL (ref 0–0.04)
IMM GRANULOCYTES NFR BLD AUTO: 1 % (ref 0–0.5)
INR PPP: 1.2 (ref 0.9–1.1)
LYMPHOCYTES # BLD: 0.7 K/UL (ref 0.9–3.6)
LYMPHOCYTES NFR BLD: 6 % (ref 21–52)
MAGNESIUM SERPL-MCNC: 2 MG/DL (ref 1.6–2.6)
MCH RBC QN AUTO: 31.3 PG (ref 24–34)
MCHC RBC AUTO-ENTMCNC: 32.8 G/DL (ref 31–37)
MCV RBC AUTO: 95.5 FL (ref 78–100)
MONOCYTES # BLD: 0.6 K/UL (ref 0.05–1.2)
MONOCYTES NFR BLD: 5 % (ref 3–10)
NEUTS SEG # BLD: 11.1 K/UL (ref 1.8–8)
NEUTS SEG NFR BLD: 87 % (ref 40–73)
NRBC # BLD: 0 K/UL (ref 0–0.01)
NRBC BLD-RTO: 0 PER 100 WBC
PLATELET # BLD AUTO: 251 K/UL (ref 135–420)
PMV BLD AUTO: 10 FL (ref 9.2–11.8)
POTASSIUM SERPL-SCNC: 3.1 MMOL/L (ref 3.5–5.5)
POTASSIUM SERPL-SCNC: 3.2 MMOL/L (ref 3.5–5.5)
PROTHROMBIN TIME: 15.3 SEC (ref 11.9–14.7)
RBC # BLD AUTO: 4.02 M/UL (ref 4.2–5.3)
SODIUM SERPL-SCNC: 137 MMOL/L (ref 136–145)
SODIUM SERPL-SCNC: 138 MMOL/L (ref 136–145)
TROPONIN I SERPL HS-MCNC: 20 NG/L (ref 0–54)
WBC # BLD AUTO: 12.7 K/UL (ref 4.6–13.2)

## 2023-11-26 PROCEDURE — 6370000000 HC RX 637 (ALT 250 FOR IP): Performed by: FAMILY MEDICINE

## 2023-11-26 PROCEDURE — 71045 X-RAY EXAM CHEST 1 VIEW: CPT

## 2023-11-26 PROCEDURE — 2500000003 HC RX 250 WO HCPCS: Performed by: FAMILY MEDICINE

## 2023-11-26 PROCEDURE — 6360000002 HC RX W HCPCS: Performed by: INTERNAL MEDICINE

## 2023-11-26 PROCEDURE — A4216 STERILE WATER/SALINE, 10 ML: HCPCS | Performed by: FAMILY MEDICINE

## 2023-11-26 PROCEDURE — 1100000003 HC PRIVATE W/ TELEMETRY

## 2023-11-26 PROCEDURE — 93010 ELECTROCARDIOGRAM REPORT: CPT | Performed by: INTERNAL MEDICINE

## 2023-11-26 PROCEDURE — 2500000003 HC RX 250 WO HCPCS: Performed by: INTERNAL MEDICINE

## 2023-11-26 PROCEDURE — 94640 AIRWAY INHALATION TREATMENT: CPT

## 2023-11-26 PROCEDURE — 85610 PROTHROMBIN TIME: CPT

## 2023-11-26 PROCEDURE — 84484 ASSAY OF TROPONIN QUANT: CPT

## 2023-11-26 PROCEDURE — 36415 COLL VENOUS BLD VENIPUNCTURE: CPT

## 2023-11-26 PROCEDURE — 2700000000 HC OXYGEN THERAPY PER DAY

## 2023-11-26 PROCEDURE — 6360000002 HC RX W HCPCS: Performed by: FAMILY MEDICINE

## 2023-11-26 PROCEDURE — 80048 BASIC METABOLIC PNL TOTAL CA: CPT

## 2023-11-26 PROCEDURE — 2580000003 HC RX 258: Performed by: INTERNAL MEDICINE

## 2023-11-26 PROCEDURE — 85025 COMPLETE CBC W/AUTO DIFF WBC: CPT

## 2023-11-26 PROCEDURE — 6370000000 HC RX 637 (ALT 250 FOR IP): Performed by: INTERNAL MEDICINE

## 2023-11-26 PROCEDURE — C9113 INJ PANTOPRAZOLE SODIUM, VIA: HCPCS | Performed by: FAMILY MEDICINE

## 2023-11-26 PROCEDURE — 2580000003 HC RX 258: Performed by: FAMILY MEDICINE

## 2023-11-26 PROCEDURE — 83735 ASSAY OF MAGNESIUM: CPT

## 2023-11-26 RX ORDER — DILTIAZEM HYDROCHLORIDE 5 MG/ML
5 INJECTION INTRAVENOUS PRN
Status: DISCONTINUED | OUTPATIENT
Start: 2023-11-26 | End: 2023-12-06 | Stop reason: HOSPADM

## 2023-11-26 RX ORDER — DILTIAZEM HYDROCHLORIDE 5 MG/ML
5 INJECTION INTRAVENOUS ONCE
Status: COMPLETED | OUTPATIENT
Start: 2023-11-26 | End: 2023-11-26

## 2023-11-26 RX ORDER — POTASSIUM CHLORIDE 20 MEQ/1
40 TABLET, EXTENDED RELEASE ORAL ONCE
Status: COMPLETED | OUTPATIENT
Start: 2023-11-26 | End: 2023-11-26

## 2023-11-26 RX ORDER — AMIODARONE HYDROCHLORIDE 50 MG/ML
150 INJECTION, SOLUTION INTRAVENOUS ONCE
Status: DISCONTINUED | OUTPATIENT
Start: 2023-11-26 | End: 2023-11-26 | Stop reason: CLARIF

## 2023-11-26 RX ADMIN — IPRATROPIUM BROMIDE 0.5 MG: 0.5 SOLUTION RESPIRATORY (INHALATION) at 16:46

## 2023-11-26 RX ADMIN — IPRATROPIUM BROMIDE 0.5 MG: 0.5 SOLUTION RESPIRATORY (INHALATION) at 08:11

## 2023-11-26 RX ADMIN — Medication 5 MG: at 21:42

## 2023-11-26 RX ADMIN — CEFEPIME 2000 MG: 2 INJECTION, POWDER, FOR SOLUTION INTRAVENOUS at 18:47

## 2023-11-26 RX ADMIN — VANCOMYCIN HYDROCHLORIDE 1000 MG: 1 INJECTION, POWDER, LYOPHILIZED, FOR SOLUTION INTRAVENOUS at 05:41

## 2023-11-26 RX ADMIN — DILTIAZEM HYDROCHLORIDE 5 MG: 5 INJECTION, SOLUTION INTRAVENOUS at 12:10

## 2023-11-26 RX ADMIN — PANTOPRAZOLE SODIUM 40 MG: 40 INJECTION, POWDER, FOR SOLUTION INTRAVENOUS at 09:04

## 2023-11-26 RX ADMIN — IPRATROPIUM BROMIDE 0.5 MG: 0.5 SOLUTION RESPIRATORY (INHALATION) at 12:42

## 2023-11-26 RX ADMIN — BUDESONIDE INHALATION 500 MCG: 0.5 SUSPENSION RESPIRATORY (INHALATION) at 20:53

## 2023-11-26 RX ADMIN — METOPROLOL TARTRATE 2.5 MG: 5 INJECTION INTRAVENOUS at 12:10

## 2023-11-26 RX ADMIN — DILTIAZEM HYDROCHLORIDE 5 MG: 5 INJECTION, SOLUTION INTRAVENOUS at 18:16

## 2023-11-26 RX ADMIN — IPRATROPIUM BROMIDE 0.5 MG: 0.5 SOLUTION RESPIRATORY (INHALATION) at 20:53

## 2023-11-26 RX ADMIN — DILTIAZEM HYDROCHLORIDE 5 MG: 5 INJECTION, SOLUTION INTRAVENOUS at 19:56

## 2023-11-26 RX ADMIN — APIXABAN 5 MG: 5 TABLET, FILM COATED ORAL at 21:42

## 2023-11-26 RX ADMIN — ENOXAPARIN SODIUM 40 MG: 100 INJECTION SUBCUTANEOUS at 09:04

## 2023-11-26 RX ADMIN — CEFEPIME 2000 MG: 2 INJECTION, POWDER, FOR SOLUTION INTRAVENOUS at 06:52

## 2023-11-26 RX ADMIN — BUDESONIDE INHALATION 500 MCG: 0.5 SUSPENSION RESPIRATORY (INHALATION) at 08:11

## 2023-11-26 RX ADMIN — POTASSIUM CHLORIDE 40 MEQ: 1500 TABLET, EXTENDED RELEASE ORAL at 14:19

## 2023-11-26 RX ADMIN — AMIODARONE HYDROCHLORIDE 1 MG/MIN: 1.8 INJECTION, SOLUTION INTRAVENOUS at 20:22

## 2023-11-26 RX ADMIN — AMIODARONE HYDROCHLORIDE 150 MG: 50 INJECTION, SOLUTION INTRAVENOUS at 20:04

## 2023-11-26 RX ADMIN — METOPROLOL TARTRATE 2.5 MG: 5 INJECTION INTRAVENOUS at 05:42

## 2023-11-26 RX ADMIN — METOPROLOL TARTRATE 12.5 MG: 25 TABLET, FILM COATED ORAL at 19:43

## 2023-11-26 RX ADMIN — METOPROLOL TARTRATE 2.5 MG: 5 INJECTION INTRAVENOUS at 00:27

## 2023-11-26 RX ADMIN — SODIUM CHLORIDE, PRESERVATIVE FREE 10 ML: 5 INJECTION INTRAVENOUS at 09:05

## 2023-11-26 RX ADMIN — VANCOMYCIN HYDROCHLORIDE 750 MG: 750 INJECTION, POWDER, LYOPHILIZED, FOR SOLUTION INTRAVENOUS at 22:28

## 2023-11-26 NOTE — PROGRESS NOTES
[facility] Specialists  Pulmonary, Critical Care, and Sleep Medicine    Name: Stanton Pino MRN: [id number]   : 1940 Hospital: [facility]    Date: 2023  Room: formerly [facility]/[address] [place] [address] Note                                                                             Consult requesting physician: Carlotta Tesfaye MD  Reason for Consult: Acute respiratory failure    IMPRESSION:     Acute respiratory failure with hypoxia  Bilateral pneumonia-treat as HCAP  Chronic atrial fibrillation  Chronic heart failure    Patient Active Problem List   Diagnosis Code    Acute hypoxemic respiratory failure (HCC) J96.01    Bilateral pneumonia J18.9    Chronic atrial fibrillation (HCC) I48.20    Acute on chronic congestive heart failure (HCC) I50.9    Dementia ([address]) F03.90    Pulmonary hypertension ([address]) I27.20    UTI (urinary tract infection) N39.0       Code status: DNR       RECOMMENDATIONS:     Respiratory: Patient appears to have combination of pneumonia and CHF, with history of pulm hypertension. Patient not needing BiPAP anymore. Nasal cannula oxygen-currently at 6 L; recommend incentive spirometry and wean FiO2-discussed with RN. Avoid LABA due to A-fib; bronchodilators-budesonide and ipratropium nebs; currently not needing IV steroids. CT chest images  reviewed-right-sided multilobar pneumonia with right lower lobe consolidation; small right basal effusion; no central or segmental PEs reported; cardiomegaly with pulmonary hypertension findings. Chest x-ray done today reviewed images and report-improved bilateral pulm infiltrates; bilateral lower lobe mild linear atelectasis; no significant pleural effusions. Keep SPO2 >=92%. HOB 30 degree elevation all the time. Aggressive pulmonary toileting. Aspiration precautions. Incentive spirometry. CVS: Stable hemodynamics; [id number]-[id number].   Echo done recently-normal EF; mild LVH; mitral valve regurgitation; mild to moderate pulm epidemiological information. Rapid Influenza A By PCR Not detected        Rapid Influenza B By PCR Not detected        Comment: Testing was performed using kelsie Kiley SARS-CoV-2 and Influenza A/B nucleic acid assay. This test is a multiplex Real-Time Reverse Transcriptase Polymerase Chain Reaction (RT-PCR) based in vitro diagnostic test intended for the qualitative detection of nucleic acids from SARS-CoV-2, Influenza A, and Influenza B in nasopharyngeal for use under the FDA's Emergency Use Authorization(EAU) only. Fact sheet for Patients: FindDrives.pl  Fact sheet for Healthcare Providers: FindDrives.pl         Legionella antigen, urine [[id number]] Collected: 11/25/23 0740    Order Status: Completed Specimen: Urine, random Updated: 11/25/23 2124     Legionella Antigen, Urine Negative       Strep Pneumoniae Antigen [[id number]] Collected: 11/25/23 0740    Order Status: Completed Specimen: Urine, random Updated: 11/25/23 2124     STREP PNEUMONIAE ANTIGEN, URINE Negative       Culture, Urine [[id number]] Collected: 11/25/23 0740    Order Status: Sent Specimen: Urine, clean catch Updated: 11/26/23 1119    Blood Culture 2 [[id number]] Collected: 11/25/23 0734    Order Status: Completed Specimen: Blood Updated: 11/26/23 1304     Special Requests NO SPECIAL REQUESTS        Culture NO GROWTH 1 DAY       Blood Culture 1 [[id number]] Collected: 11/25/23 0648    Order Status: Completed Specimen: Blood Updated: 11/26/23 0742     Special Requests NO SPECIAL REQUESTS        Culture NO GROWTH 1 DAY                  CTA CHEST W WO CONTRAST  Result Date: 11/25/2023  EXAM:  CTA CHEST W WO CONTRAST INDICATION: respiratory failure, concern for PE COMPARISON: Chest radiograph 11/25/2023 CONTRAST:  100 mL of Isovue-370. ? Technique:  Following the uneventful intravenous administration of contrast, thin axial images were obtained through the chest. Coronal and sagittal RA size. PAP was 30 mmHg. Please note: Voice-recognition software may have been used to generate this report, which may have resulted in some phonetic-based errors in grammar and contents. Even though attempts were made to correct all the mistakes, some may have been missed, and remained in the body of the document.     Denis Toussaint MD  11/26/2023

## 2023-11-26 NOTE — PROGRESS NOTES
DC Plan: Return to [facility] vs home with [facility] - lives in [place] at [facility] met with patient and performed admission and readmission assessment; patient desires to ultimately return to her AL apartment in [facility] where she has lived the past 2 years;  discussed her continuing with PT/OT while at hospital so that she does not have further decline in functioning. Per patient, she has a rollator for ambulation. Care manager will continue to follow to see if returning to [facility] to continue rehab is as safe as returning home with [facility] PT/OT.   Readmission Assessment  Number of Days since last admission?: 1-7 days  Previous Disposition: SNF  Who is being Interviewed: Patient  What was the patient's/caregiver's perception as to why they think they needed to return back to the hospital?: Other (Comment), Did not realize care needs would be so extensive (exacerbation of orginal problem)  Did you visit your Primary Care Physician after you left the hospital, before you returned this time?: No  Why weren't you able to visit your PCP?: Other (Comment) (readmitted too quickly)  Did you see a specialist, such as Cardiac, Pulmonary, Orthopedic Physician, etc. after you left the hospital?: No  Who advised the patient to return to the hospital?: Skilled Unit  Does the patient report anything that got in the way of taking their medications?: No  In our efforts to provide the best possible care to you and others like you, can you think of anything that we could have done to help you after you left the hospital the first time, so that you might not have needed to return so soon?: Additional Community resources available for illness support, Teach back instructions regarding management of illness

## 2023-11-26 NOTE — PROGRESS NOTES
Bedside shift change report given to Torres Baker RN (oncoming nurse) by [facility], RN (offgoing nurse). Report included the following information Nurse Handoff Report, MAR, Recent Results, and Cardiac Rhythm   .

## 2023-11-27 ENCOUNTER — APPOINTMENT (OUTPATIENT)
Facility: HOSPITAL | Age: 83
DRG: 193 | End: 2023-11-27
Attending: FAMILY MEDICINE
Payer: MEDICARE

## 2023-11-27 LAB
ANION GAP SERPL CALC-SCNC: 6 MMOL/L (ref 3–18)
BASOPHILS # BLD: 0 K/UL (ref 0–0.1)
BASOPHILS NFR BLD: 0 % (ref 0–2)
BUN SERPL-MCNC: 15 MG/DL (ref 7–18)
BUN/CREAT SERPL: 19 (ref 12–20)
CALCIUM SERPL-MCNC: 8.7 MG/DL (ref 8.5–10.1)
CHLORIDE SERPL-SCNC: 103 MMOL/L (ref 100–111)
CO2 SERPL-SCNC: 29 MMOL/L (ref 21–32)
CREAT SERPL-MCNC: 0.81 MG/DL (ref 0.6–1.3)
DIFFERENTIAL METHOD BLD: ABNORMAL
ECHO AV AREA PEAK VELOCITY: 2.1 CM2
ECHO AV AREA/BSA PEAK VELOCITY: 1 CM2/M2
ECHO AV PEAK GRADIENT: 5 MMHG
ECHO AV PEAK VELOCITY: 1.1 M/S
ECHO AV VELOCITY RATIO: 0.64
ECHO BSA: 2.21 M2
ECHO LV FRACTIONAL SHORTENING: 26 % (ref 28–44)
ECHO LV INTERNAL DIMENSION DIASTOLE INDEX: 1.97 CM/M2
ECHO LV INTERNAL DIMENSION DIASTOLIC: 4.2 CM (ref 3.9–5.3)
ECHO LV INTERNAL DIMENSION SYSTOLIC INDEX: 1.46 CM/M2
ECHO LV INTERNAL DIMENSION SYSTOLIC: 3.1 CM
ECHO LV IVSD: 1 CM (ref 0.6–0.9)
ECHO LV MASS 2D: 127.8 G (ref 67–162)
ECHO LV MASS INDEX 2D: 60 G/M2 (ref 43–95)
ECHO LV POSTERIOR WALL DIASTOLIC: 0.9 CM (ref 0.6–0.9)
ECHO LV RELATIVE WALL THICKNESS RATIO: 0.43
ECHO LVOT AREA: 3.5 CM2
ECHO LVOT DIAM: 2.1 CM
ECHO LVOT PEAK GRADIENT: 2 MMHG
ECHO LVOT PEAK VELOCITY: 0.7 M/S
ECHO TV REGURGITANT MAX VELOCITY: 3.28 M/S
ECHO TV REGURGITANT PEAK GRADIENT: 43 MMHG
EOSINOPHIL # BLD: 0.3 K/UL (ref 0–0.4)
EOSINOPHIL NFR BLD: 3 % (ref 0–5)
ERYTHROCYTE [DISTWIDTH] IN BLOOD BY AUTOMATED COUNT: 14.1 % (ref 11.6–14.5)
GLUCOSE SERPL-MCNC: 113 MG/DL (ref 74–99)
HCT VFR BLD AUTO: 35.7 % (ref 35–45)
HGB BLD-MCNC: 11.4 G/DL (ref 12–16)
IMM GRANULOCYTES # BLD AUTO: 0.1 K/UL (ref 0–0.04)
IMM GRANULOCYTES NFR BLD AUTO: 0 % (ref 0–0.5)
LYMPHOCYTES # BLD: 1 K/UL (ref 0.9–3.6)
LYMPHOCYTES NFR BLD: 8 % (ref 21–52)
MCH RBC QN AUTO: 30.3 PG (ref 24–34)
MCHC RBC AUTO-ENTMCNC: 31.9 G/DL (ref 31–37)
MCV RBC AUTO: 94.9 FL (ref 78–100)
MONOCYTES # BLD: 0.8 K/UL (ref 0.05–1.2)
MONOCYTES NFR BLD: 6 % (ref 3–10)
NEUTS SEG # BLD: 9.7 K/UL (ref 1.8–8)
NEUTS SEG NFR BLD: 82 % (ref 40–73)
NRBC # BLD: 0 K/UL (ref 0–0.01)
NRBC BLD-RTO: 0 PER 100 WBC
PLATELET # BLD AUTO: 232 K/UL (ref 135–420)
PMV BLD AUTO: 10.1 FL (ref 9.2–11.8)
POTASSIUM SERPL-SCNC: 3.5 MMOL/L (ref 3.5–5.5)
RBC # BLD AUTO: 3.76 M/UL (ref 4.2–5.3)
SODIUM SERPL-SCNC: 138 MMOL/L (ref 136–145)
TROPONIN I SERPL HS-MCNC: 21 NG/L (ref 0–54)
VANCOMYCIN SERPL-MCNC: 12.6 UG/ML (ref 5–40)
WBC # BLD AUTO: 11.9 K/UL (ref 4.6–13.2)

## 2023-11-27 PROCEDURE — 6370000000 HC RX 637 (ALT 250 FOR IP): Performed by: FAMILY MEDICINE

## 2023-11-27 PROCEDURE — 2580000003 HC RX 258: Performed by: INTERNAL MEDICINE

## 2023-11-27 PROCEDURE — 2580000003 HC RX 258: Performed by: FAMILY MEDICINE

## 2023-11-27 PROCEDURE — 92610 EVALUATE SWALLOWING FUNCTION: CPT

## 2023-11-27 PROCEDURE — 97530 THERAPEUTIC ACTIVITIES: CPT

## 2023-11-27 PROCEDURE — 2500000003 HC RX 250 WO HCPCS: Performed by: FAMILY MEDICINE

## 2023-11-27 PROCEDURE — 6370000000 HC RX 637 (ALT 250 FOR IP): Performed by: INTERNAL MEDICINE

## 2023-11-27 PROCEDURE — 85025 COMPLETE CBC W/AUTO DIFF WBC: CPT

## 2023-11-27 PROCEDURE — 6360000004 HC RX CONTRAST MEDICATION: Performed by: FAMILY MEDICINE

## 2023-11-27 PROCEDURE — C8924 2D TTE W OR W/O FOL W/CON,FU: HCPCS

## 2023-11-27 PROCEDURE — 36415 COLL VENOUS BLD VENIPUNCTURE: CPT

## 2023-11-27 PROCEDURE — 97162 PT EVAL MOD COMPLEX 30 MIN: CPT

## 2023-11-27 PROCEDURE — 97165 OT EVAL LOW COMPLEX 30 MIN: CPT

## 2023-11-27 PROCEDURE — 6360000002 HC RX W HCPCS: Performed by: INTERNAL MEDICINE

## 2023-11-27 PROCEDURE — 6360000002 HC RX W HCPCS: Performed by: FAMILY MEDICINE

## 2023-11-27 PROCEDURE — A4216 STERILE WATER/SALINE, 10 ML: HCPCS | Performed by: FAMILY MEDICINE

## 2023-11-27 PROCEDURE — 99222 1ST HOSP IP/OBS MODERATE 55: CPT | Performed by: NURSE PRACTITIONER

## 2023-11-27 PROCEDURE — 97535 SELF CARE MNGMENT TRAINING: CPT

## 2023-11-27 PROCEDURE — 94640 AIRWAY INHALATION TREATMENT: CPT

## 2023-11-27 PROCEDURE — 92526 ORAL FUNCTION THERAPY: CPT

## 2023-11-27 PROCEDURE — 80048 BASIC METABOLIC PNL TOTAL CA: CPT

## 2023-11-27 PROCEDURE — 1100000003 HC PRIVATE W/ TELEMETRY

## 2023-11-27 PROCEDURE — 2700000000 HC OXYGEN THERAPY PER DAY

## 2023-11-27 PROCEDURE — 80202 ASSAY OF VANCOMYCIN: CPT

## 2023-11-27 PROCEDURE — C9113 INJ PANTOPRAZOLE SODIUM, VIA: HCPCS | Performed by: FAMILY MEDICINE

## 2023-11-27 RX ADMIN — AMIODARONE HYDROCHLORIDE 0.5 MG/MIN: 1.8 INJECTION, SOLUTION INTRAVENOUS at 02:54

## 2023-11-27 RX ADMIN — CEFEPIME 2000 MG: 2 INJECTION, POWDER, FOR SOLUTION INTRAVENOUS at 19:38

## 2023-11-27 RX ADMIN — CEFEPIME 2000 MG: 2 INJECTION, POWDER, FOR SOLUTION INTRAVENOUS at 11:35

## 2023-11-27 RX ADMIN — AMIODARONE HYDROCHLORIDE 0.5 MG/MIN: 1.8 INJECTION, SOLUTION INTRAVENOUS at 11:50

## 2023-11-27 RX ADMIN — PERFLUTREN 1.5 ML: 6.52 INJECTION, SUSPENSION INTRAVENOUS at 17:21

## 2023-11-27 RX ADMIN — ONDANSETRON 4 MG: 2 INJECTION INTRAMUSCULAR; INTRAVENOUS at 21:11

## 2023-11-27 RX ADMIN — PANTOPRAZOLE SODIUM 40 MG: 40 INJECTION, POWDER, FOR SOLUTION INTRAVENOUS at 08:00

## 2023-11-27 RX ADMIN — APIXABAN 5 MG: 5 TABLET, FILM COATED ORAL at 08:00

## 2023-11-27 RX ADMIN — BUDESONIDE INHALATION 500 MCG: 0.5 SUSPENSION RESPIRATORY (INHALATION) at 08:12

## 2023-11-27 RX ADMIN — BUDESONIDE INHALATION 500 MCG: 0.5 SUSPENSION RESPIRATORY (INHALATION) at 19:30

## 2023-11-27 RX ADMIN — VANCOMYCIN HYDROCHLORIDE 750 MG: 750 INJECTION, POWDER, LYOPHILIZED, FOR SOLUTION INTRAVENOUS at 20:14

## 2023-11-27 RX ADMIN — IPRATROPIUM BROMIDE 0.5 MG: 0.5 SOLUTION RESPIRATORY (INHALATION) at 15:56

## 2023-11-27 RX ADMIN — POTASSIUM BICARBONATE 40 MEQ: 782 TABLET, EFFERVESCENT ORAL at 02:46

## 2023-11-27 RX ADMIN — APIXABAN 5 MG: 5 TABLET, FILM COATED ORAL at 19:42

## 2023-11-27 RX ADMIN — CEFEPIME 2000 MG: 2 INJECTION, POWDER, FOR SOLUTION INTRAVENOUS at 02:46

## 2023-11-27 RX ADMIN — IPRATROPIUM BROMIDE 0.5 MG: 0.5 SOLUTION RESPIRATORY (INHALATION) at 08:12

## 2023-11-27 RX ADMIN — Medication 5 MG: at 19:42

## 2023-11-27 RX ADMIN — SODIUM CHLORIDE, PRESERVATIVE FREE 10 ML: 5 INJECTION INTRAVENOUS at 19:42

## 2023-11-27 RX ADMIN — SODIUM CHLORIDE, PRESERVATIVE FREE 10 ML: 5 INJECTION INTRAVENOUS at 08:04

## 2023-11-27 RX ADMIN — AMIODARONE HYDROCHLORIDE 1 MG/MIN: 1.8 INJECTION, SOLUTION INTRAVENOUS at 01:38

## 2023-11-27 RX ADMIN — METOPROLOL TARTRATE 12.5 MG: 25 TABLET, FILM COATED ORAL at 08:00

## 2023-11-27 RX ADMIN — IPRATROPIUM BROMIDE 0.5 MG: 0.5 SOLUTION RESPIRATORY (INHALATION) at 12:25

## 2023-11-27 RX ADMIN — METOPROLOL TARTRATE 25 MG: 25 TABLET, FILM COATED ORAL at 19:42

## 2023-11-27 RX ADMIN — VANCOMYCIN HYDROCHLORIDE 750 MG: 750 INJECTION, POWDER, LYOPHILIZED, FOR SOLUTION INTRAVENOUS at 08:00

## 2023-11-27 RX ADMIN — IPRATROPIUM BROMIDE 0.5 MG: 0.5 SOLUTION RESPIRATORY (INHALATION) at 19:30

## 2023-11-27 NOTE — ACP (ADVANCE CARE PLANNING)
[address] [phone]     General Advance Care Planning (ACP)   Conversation    CODE STATUS:  DNR / DNI. DDNR placed in paper file. AMD: NO AMD on file. Requested family member provide copy. Date of Conversation: 11/27/2023  Conducted with: Adult Daughter, Cathy Schmid, [address]  Decision Maker:    Primary Decision Maker: Cathy Schmid - Child - [phone]  Click here to complete [address] including selection of the Healthcare Decision Maker Relationship (ie \"Primary\"). Today we documented Decision Maker(s). The patient will provide ACP documents. Content/Action Overview: Has ACP document(s) on file - reflects the patient's care preferences  Reviewed DNR/DNI and patient confirms current DNR status - completed forms on file (place new order if needed)  treatment goals, benefit/burden of treatment options, ventilation preferences, resuscitation preferences, and end of life care preferences (vegetative state/imminent death)    Palliative team, Sharona Amador NP and this SW met with pt at bedside for initial assessment. Upon entry, pt laying in bed, head elevated, receiving O2 via nasal canula. Pt AAOx3. Pt engaged in conversation appropriately when spoken to. Palliative team and purpose of palliative care introduced. Pt reports she has four adult children and provided their names. Pt reports she lives at [facility] in [facility]. Pt reports she enjoys playing bridge and that they have a bridge group at the facility where she resides. She reports she ambulates with a rollator walker. She reports she does not utilize O2 normally. Pt gave verbal consent to contact her daughter, Cathy Schmid to ascertain information regarding surrogate decision making, in event she is unable to make her own medical decisions.      LMSW made telephone contact with pt's daughter, Juany Gore at [phone], to ascertain additional information. Narayan Gunderson states, pt has a [place] and she believes Surrogate decision maker assignment is addressed in there and that she [place] Neptali) is MPoA. LMSW explained we need to get a copy of that document, and until such time, we will need to consult with all four children for decisions. Narayan Gunderson reports this will not be a problem. LMSW asked for phone numbers for her siblings. Narayan Gunderson reports she is driving, but her brother will be at hospital this afternoon and he has that information. LMSW informed her will follow up with Chris, upon his arrival. She verbalized agreement with this plan. No further questoins or concerns at this time. Goals of Care addressed: Pt has DDNR on file, copy placed in Hard copy chart. AMD addressed:  Pt's family has been asked to provide copy of Living Trust/Living Will that identifies MPoA. [address] [facility] [employer] LMSW returned to pt's room to meet with pt's son. No family at bedside. LMSW asked pt's RN to notify palliative team if/when family arrives. Thank you for this referral to Palliative Care. The palliative care team remains available to provide support to patient and their family.       Hector Manning LMSW, APHSW-C  Palliative Medicine Inpatient   [facility]  [phone]  [facility]   Palliative COPE Line: [phone] ([phone])

## 2023-11-27 NOTE — PROGRESS NOTES
Occupational Therapy Goals:  Initiated 11/27/2023 to be met within 7-10 days. Short Term Goals  Time Frame for Short Term Goals: 7 days  Short Term Goal 1: Pt will complete bed mobility with SUP to improve mobility for EOB/OOB tasks. Short Term Goal 2: Pt will complete UBD/bathing seated EOB with SUP. Short Term Goal 3: Pt will complete LBD/bathing seated EOB with mod A. Short Term Goal 4: Pt will complete grooming seated EOB with SUP. OCCUPATIONAL THERAPY EVALUATION    Patient: Vineet Mccauley (27 y.o. female)  Date: 11/27/2023  Primary Diagnosis: SIRS (systemic inflammatory response syndrome) ([facility]) [R65.10]  Acute respiratory failure with hypoxia ([facility]) [J96.01]  Acute hypoxemic respiratory failure ([facility]) [J96.01]  Acute on chronic congestive heart failure, unspecified heart failure type ([facility]) [I50.9]       Precautions: Fall Risk, Bed Alarm, Up as Tolerated,  ,  ,  ,  ,  ,  ,    PLOF: min A for lower body adls at [facility], Mod I for functional mobility with rollator     ASSESSMENT :  Pt received sidelying in bed. CNA present at bedside for bed level toileting. Min A to roll L and R, total A for pericare in sidelying, total A for brief change. Alert and oriented. Attached to: double IV, tele, pulse ox, O2 via NC, bed alarm, non slip socks. Reporting 0/10 pain. Agreeable to mobility and motivated to participate. Bed mobility: Pt performed min A to roll L and R. Min A sup>sit to L to EOB. SBA for return to supine. Total A to boost to [facility] via hercules system. Functional mobility: Pt performed sit>stand from EOB with min A. SBA for return to seated. ADLs:  LBD - total A for doffing/donning socks seated EOB. Unable to functionally reach feet with BUE, coming mid shin, unable to assume figure 4 positioning. Patient Strengths: motivated and good participation.    Patient Weaknesses: BUE/BLE weakness, decreased safety awareness, decreased seated and standing balance, decreased lower body ADLs, fatigue, AROM: Generally decreased, functional  PROM: Generally decreased, functional      Functional Mobility and Transfers for ADLs:  Bed Mobility:     Bed Mobility Training  Bed Mobility Training: Yes  Overall Level of Assistance: Minimum assistance;Assist X1;Additional time  Interventions: Tactile cues; Verbal cues  Rolling: Minimum assistance;Assist X1  Supine to Sit: Minimum assistance;Assist X1;Additional time  Sit to Supine: Stand-by assistance;Assist X1  Scooting: Total assistance (total boost to [facility] with hercules sheets)  Transfers:    Transfer Training  Transfer Training: Yes  Overall Level of Assistance: Minimum assistance;Assist X1;Additional time  Interventions: Safety awareness training;Verbal cues  Sit to Stand: Minimum assistance;Assist X1;Additional time (HHA, min vc's for push up on bed through BLE. Unable to utilize OT hands for assist, stating that she needed to sit and did not feel stable)  Stand to Sit: Stand-by assistance;Assist X1    ADL Assessment:     LE Dressing: Dependent/Total  Toileting: Maximum assistance (max A for toileting at bed level, pt able to roll to L and R with min A, total A for pericare and brief management.)    Pain:  Pain level pre-treatment: 0/10   Pain level post-treatment: 0/10   Pain Intervention(s): Rest, Repositioning   Response to intervention: Nurse notified    Activity Tolerance:   Activity Tolerance: Treatment limited secondary to medical complications (free text), Patient limited by fatigue, Patient Tolerated treatment well  Please refer to the flowsheet for vital signs taken during this treatment. After treatment:   [] Patient left in no apparent distress sitting up in chair  [x] Patient left in no apparent distress in bed  [x] Call bell left within reach  [x] Nursing notified  [x] Caregiver/family present  [x] Bed alarm activated    COMMUNICATION/EDUCATION:   Patient Education  Education Given To: Patient; Family  Education Provided: Role of Therapy;Plan of

## 2023-11-27 NOTE — PROGRESS NOTES
Speech Pathology:    Orders received and chart reviewed. Patient on regular diet and thin liquids. Stat dysphagia evaluation not indicated. Will follow up next business date.      Thank you for this referral.     Nancy Plascencia M.S., [facility]  Speech-Language Pathologist

## 2023-11-27 NOTE — PROGRESS NOTES
1957 This RN notified by monitor [employer] that pt HR was in the 190's assessed patient   2000 Patient went into sustained vtach but remained asymptomatic. RRT was called   2002 Administered PRN Cardizem per order  2004 Dr. Óscar Joya at bedside orders for 150mg amiodarone bolus given and administered per order. 2014 Patient converted back to afib which is the patients baseline     Bedside shift change report given to [employer], RN (oncoming nurse) by [employer], RN (offgoing nurse). Report included the following information Nurse Handoff Report, MAR, Recent Results, and Cardiac Rhythm   .

## 2023-11-27 NOTE — CONSULTS
Palliative Medicine Consult    Patient Name: Christine James  YOB: 1940    Date of Initial Consult: 11/27/2023  Reason for Consult: Goals of care discussions  Requesting Provider: Avila Hardy MD   Primary Care Physician: Beatriz Rowe MD      SUMMARY:   Christine James is a 78 y.o. with a past history of atrial fibrillation, hypothyroidism, anemia of chronic disease, CHF, hypertension, and hyperlipidemia, and recent hospitalization earlier this month at [facility] for pneumonia and discharged to SNF at [facility] who was admitted on 11/25/2023 from [facility] with a diagnosis of acute respiratory failure with hypoxia, bilateral pneumonia, HCAP, chronic A-fib, chronic heart failure. Current medical issues leading to Palliative Medicine involvement include: Goals of care discussion. PALLIATIVE DIAGNOSES:   Goals of care discussion/advance care planning  Respiratory failure with hypoxia  Bilateral pneumonia/HCAP  Chronic A-fib/chronic heart failure  Debility       PLAN:   Goals of care discussion/advance care planning: Palliative medicine team including Dennis Navarrete LMSW and I met with patient at patient's bedside. Patient is awake, alert, and oriented to person, place, and time. She has some confusion about current situation, needs to be reminded why she is in the hospital.  Has poor recall of her previous hospitalization and admission to [facility] for rehab. Patient has 4 children who are legal next of kin. With patient's permission, called patient's daughter Kennedy Hui who confirms patient has 4 children. Andres Rizvi states that patient has a living trust and she believes a medical power of  document exists, and she was encouraged to provide that document. Explained that in the absence of documentation, decision making would fall to the majority consensus of patient's children (3 out of 4 would need to agree).   Andres Rizvi reports that all of patient's 06:48 AM     Lab Results   Component Value Date/Time    INR 1.2 11/26/2023 05:22 AM    APTT 77.2 11/25/2023 03:52 PM      No results found for: \"IRON\", \"TIBC\", \"IBCT\", \"FERR\"   No results found for: \"PH\", \"PCO2\", \"PO2\"  No components found for: \"GLPOC\"   No results found for: \"CPK\", \"CKMB\", \"TROPONINI\"             The total encounter time on this service date was 55 minutes which was spent performing a face-to-face encounter and personally completing the provider-level activities documented in the note. This includes time spent prior to the visit and after the visit in direct care of the patient. This time does not include time spent in any separately reportable services. Disclaimer: Sections of this note are dictated using utilizing voice recognition software, which may have resulted in some phonetic based errors in grammar and contents. Even though attempts were made to correct all the mistakes, some may have been missed, and remained in the body of the document. If questions arise, please contact our department.

## 2023-11-27 NOTE — PROGRESS NOTES
SPEECH LANGUAGE PATHOLOGY BEDSIDE SWALLOW EVALUATION/TREATMENT  Recommendations:   Regular diet with thin liquids  Meds crushed in puree  Feeding assistance as needed   Aspiration precautions  HOB >45 degrees during all intake and for at least 30 min after intake  Small bites/sips, Feed slowly, alternating bites/sips   Oral care three times daily   Dysphagia treatment    Goals:  Patient will:  1. Tolerate PO trials with 0 s/sx overt distress in 4/5 trials in order to determine least restrictive diet  2. Utilize compensatory swallow strategies/maneuvers (decrease bite/sip, size/rate, alt. liq/sol) with min cues in 4/5 trials in order to increase safety and tolerance of PO intake. Patient: Gena Sinclair (55 y.o. female)  Date: 11/27/2023  Primary Diagnosis: SIRS (systemic inflammatory response syndrome) ([facility]) [R65.10]  Acute respiratory failure with hypoxia ([facility]) [J96.01]  Acute hypoxemic respiratory failure ([facility]) [J96.01]  Acute on chronic congestive heart failure, unspecified heart failure type ([address]) [I50.9]       Precautions: Aspiration  PLOF: As per H&P  ASSESSMENT:  Based on the objective data described below, the patient presents with mild pharyngeal dysphagia. Patient seen today for clinical bedside swallow evaluation. Son at bedside. She presents alert and oriented x4. Complains of chest congestion and excessive phlegm causing frequent cough. She and her son report occasional coughing with solids at baseline and that she has difficulty swallowing pills. Oral mech exam revealed structures essentially functional for mastication and deglutition. Patient tolerated thin liquid +straw and regular solid from dinner tray without overt s/sx of aspiration observed. Did demo delayed cough x2 following thin, but reports it was d/t mucus. Positive oral clearance noted across all trials. Possible diminished hyolaryngeal excursion noted to palpation.  Also noted patient easily fatigued/winded when eating/drinking SUBJECTIVE:   Patient stated, Thank you.     OBJECTIVE:     Past Medical History:   Diagnosis Date    Atrial fibrillation ([address])     Hypothyroidism      Past Surgical History:   Procedure Laterality Date    HYSTERECTOMY      TOTAL KNEE ARTHROPLASTY       Prior Level of Function/Home Situation:  Social/Functional History  Lives With: Alone  Type of Home: Senior housing apartment  Home Layout: One level  Home Equipment: Rollator  ADL Assistance: Needs assistance  Homemaking Assistance: Needs assistance  Ambulation Assistance: Needs assistance  Transfer Assistance: Needs assistance  Active : No    Daily Assessment:  Baseline Assessment  Temperature Spikes Noted: No  Respiratory Status: O2 via nasual cannula  O2 Device: Nasal cannula  Communication Observation: Functional  Follows Directions: Complex  Current Diet : Regular  Current Liquid Diet : Thin  Dentition: Adequate  Patient Positioning: Upright in bed  Baseline Vocal Quality: Hoarse  Volitional Cough: Strong    Orientation:  Alert  x4    Oral Assessment:  Oral Motor   Labial: No impairment  Dentition: Intact  Oral Hygiene: Moist, Clean  Lingual: No impairment  Velum: No Impairment  Mandible: No impairment  Gag: No Impairment  Pharyngeal Phase: WFL         DYSPHAGIA DIAGNOSIS: Dysphagia Diagnosis: Mild pharyngeal stage dysphagia    PAIN:  Start of Eval: 0  End of Eval: 0     After treatment:   []            Patient left in no apparent distress sitting up in chair  [x]            Patient left in no apparent distress in bed  [x]            Call bell left within reach  []            Nursing notified  [x]            Family present  []            Caregiver present  []            Bed alarm activated    COMMUNICATION/EDUCATION:   [x]            Aspiration precautions; swallow safety; compensatory techniques provided via demonstration, verbalization and teach back of comprehension  []         Patient/family have participated as able in goal setting and plan of

## 2023-11-27 NOTE — PROGRESS NOTES
Rapid response note for vtach    Pt went into sustained vtach. She was asymptomatic and mentating normally. I gave 150 mg amiodarone bolus with conversion back to atrial fibrillation (baseline). I ordered follow up infusion at 1 mg/min x6 hours followed by 0.5 mg/min infusion x18 hours. I have ordered echo for the morning. Troponin, BMP, Mg ordered STAT. Will consult cardiology as well.

## 2023-11-27 NOTE — PROGRESS NOTES
conducted an initial consultation and spiritual assessment for Naveen Shaver, who is a 78 y.o.,female. Initiated a relationship of care and support. Explored issues of martha, belief, spirituality and Judaism/ritual needs. Listened empathically. Provided information about Spiritual Care Services. Patient processed feelings about current hospitalization. Offered prayer and assurance of continued prayers on patients behalf. Chart reviewed. Patient expressed gratitude for Spiritual Care visit. Chaplains will continue to follow and will provide pastoral care as needed or requested.  recommends bedside caregivers page  on duty if patient shows signs of acute spiritual or emotional distress. [facility] [id number], [address] [facility] [address] Liz.   [facility]  [phone] - Office

## 2023-11-27 NOTE — PROGRESS NOTES
SW spoke with patient regarding DC planning needs. Patient unsure where she admitted from. SW reached out to daughter which stated that patient admitted to [facility] from [facility]. Daughter stated that patient originally from [facility] in [facility]. Patient needing skilled services upon DC from [facility], once again. Referral placed to [facility] as well as [facility] in [facility] for review. SW to follow to assist with DC planning needs.

## 2023-11-27 NOTE — PROGRESS NOTES
1953: Patient HR increased to 170s, Afib RVR. Notified primary RN for PRN Cardizem administration. Patient then converted to VT and this RN called RRT. 2010:  Patient converted back to Afib with ar rate of 122 at this time.

## 2023-11-27 NOTE — PROGRESS NOTES
Pulmonary Specialists  Pulmonary, Critical Care, and Sleep Medicine    Name: Jessica Good MRN: [id number]   : 1940 Hospital: [facility]    Date: 2023  Room: [facility]/[address] [place] [address] Note                                                                             Consult requesting physician: Carla Morfin MD  Reason for Consult: Acute respiratory failure    IMPRESSION:     Acute respiratory failure with hypoxia  Bilateral pneumonia-treat as HCAP  Chronic atrial fibrillation  Chronic heart failure    Patient Active Problem List   Diagnosis Code    Acute hypoxemic respiratory failure (HCC) J96.01    Bilateral pneumonia J18.9    Chronic atrial fibrillation (HCC) I48.20    Acute on chronic congestive heart failure (HCC) I50.9    Dementia ([address]) F03.90    Pulmonary hypertension (HCC) I27.20    UTI (urinary tract infection) N39.0    Atrial fibrillation with RVR ([address]) I48.91       Code status: DNR       RECOMMENDATIONS:     Respiratory: Patient appears to have combination of pneumonia and CHF, with history of pulm hypertension. Patient not needing BiPAP anymore. Nasal cannula oxygen-currently at 5 L; recommend incentive spirometry and wean FiO2. Bronchodilators: Continue budesonide nebs twice daily, ipratropium nebs 4 times daily. Avoid LABA due to A-fib. Steroids: Not indicated. O2: On 5 LPM O2 NS; titrate to keep SpO2 more than 91%. Antibiotics as below. CT chest images 23 reviewed-right-sided multilobar pneumonia with right lower lobe consolidation; small right basal effusion; no central or segmental PEs reported; cardiomegaly with pulmonary hypertension findings. CXR 2023: Mildly improved bilateral pulmonary infiltrates. Bilateral lower lobe linear atelectasis. No significant pleural effusion. Not using incentive spirometer; advised to use frequently. Start Acapella flutter valve therapy. Keep SPO2 >=92%. HOB 30 degree elevation all the time. Aggressive pulmonary toileting. Aspiration precautions. Incentive spirometry. CVS: A-fib with RVR. Status post V. tach last night; required amiodarone. Echo done recently-normal EF; mild LVH; mitral valve regurgitation; mild to moderate pulm hypertension. proBNP elevated. On Eliquis, metoprolol, amiodarone p.o and drip. Cardiology is on board. ID:   SARS-CoV-2 and influenza PCR negative  Blood cultures 11/25/2023: NGTD. Sputum culture 11/25/2023: Heavy normal anay. Urine Legionella and pneumococcal antigen-negative. UA positive; recent pansensitive E. coli UTI; urine culture 11/25-pending. Lactic acid-1.2. Broad-spectrum antibiotics-cefepime and IV vancomycin. History of allergy to penicillin, but patient seems to be tolerating cefepime. Follow cultures, and deescalate antibiotic when appropriate. Discussed with patient, answered all questions to her satisfaction. Moderate complexity decision making was performed during the evaluation of this patient at high risk for decompensation with multiple organ involvement. Subjective/History of Present Illness:     Patient is a 78 y.o. female with PMHx significant for chronic atrial fibrillation, dementia. Patient has been discharged from [address] [place] [address] recently-admitted 11/15 - 11/22/2023. Patient was treated for sepsis, UTI, A-fib with RVR, diastolic CHF. CT chest evidence of groundglass patchy opacities and patient completed antibiotic therapy. Urine culture showed pansensitive E. coli. Blood cultures and MRSA screen negative. Patient was on metoprolol and Eliquis for A-fib management. 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    Patient has been sent from rehab hospital with productive cough and worsening shortness of breath. Patient initially needed BiPAP in the ER.      11/27/2023 :     Seen in room 343. On 5 LPM O2.   Last night episode of V. tach required

## 2023-11-28 PROBLEM — I47.20 SUSTAINED VENTRICULAR TACHYCARDIA (HCC): Status: ACTIVE | Noted: 2023-11-28

## 2023-11-28 LAB
ANION GAP SERPL CALC-SCNC: 8 MMOL/L (ref 3–18)
BACTERIA SPEC CULT: ABNORMAL
BACTERIA SPEC CULT: ABNORMAL
BACTERIA SPEC CULT: NORMAL
BASOPHILS # BLD: 0.1 K/UL (ref 0–0.1)
BASOPHILS NFR BLD: 0 % (ref 0–2)
BUN SERPL-MCNC: 11 MG/DL (ref 7–18)
BUN/CREAT SERPL: 14 (ref 12–20)
CALCIUM SERPL-MCNC: 8.6 MG/DL (ref 8.5–10.1)
CC UR VC: ABNORMAL
CHLORIDE SERPL-SCNC: 105 MMOL/L (ref 100–111)
CO2 SERPL-SCNC: 25 MMOL/L (ref 21–32)
CREAT SERPL-MCNC: 0.76 MG/DL (ref 0.6–1.3)
DIFFERENTIAL METHOD BLD: ABNORMAL
EOSINOPHIL # BLD: 0.4 K/UL (ref 0–0.4)
EOSINOPHIL NFR BLD: 3 % (ref 0–5)
ERYTHROCYTE [DISTWIDTH] IN BLOOD BY AUTOMATED COUNT: 14.2 % (ref 11.6–14.5)
GLUCOSE SERPL-MCNC: 106 MG/DL (ref 74–99)
GRAM STN SPEC: NORMAL
HCT VFR BLD AUTO: 36.3 % (ref 35–45)
HGB BLD-MCNC: 11.9 G/DL (ref 12–16)
IMM GRANULOCYTES # BLD AUTO: 0.1 K/UL (ref 0–0.04)
IMM GRANULOCYTES NFR BLD AUTO: 0 % (ref 0–0.5)
LYMPHOCYTES # BLD: 0.9 K/UL (ref 0.9–3.6)
LYMPHOCYTES NFR BLD: 7 % (ref 21–52)
MCH RBC QN AUTO: 31.3 PG (ref 24–34)
MCHC RBC AUTO-ENTMCNC: 32.8 G/DL (ref 31–37)
MCV RBC AUTO: 95.5 FL (ref 78–100)
MONOCYTES # BLD: 0.8 K/UL (ref 0.05–1.2)
MONOCYTES NFR BLD: 7 % (ref 3–10)
NEUTS SEG # BLD: 9.7 K/UL (ref 1.8–8)
NEUTS SEG NFR BLD: 82 % (ref 40–73)
NRBC # BLD: 0 K/UL (ref 0–0.01)
NRBC BLD-RTO: 0 PER 100 WBC
PLATELET # BLD AUTO: 242 K/UL (ref 135–420)
PMV BLD AUTO: 10.1 FL (ref 9.2–11.8)
POTASSIUM SERPL-SCNC: 3.5 MMOL/L (ref 3.5–5.5)
RBC # BLD AUTO: 3.8 M/UL (ref 4.2–5.3)
SERVICE CMNT-IMP: ABNORMAL
SERVICE CMNT-IMP: NORMAL
SODIUM SERPL-SCNC: 138 MMOL/L (ref 136–145)
WBC # BLD AUTO: 11.9 K/UL (ref 4.6–13.2)

## 2023-11-28 PROCEDURE — 6370000000 HC RX 637 (ALT 250 FOR IP): Performed by: INTERNAL MEDICINE

## 2023-11-28 PROCEDURE — 1100000003 HC PRIVATE W/ TELEMETRY

## 2023-11-28 PROCEDURE — 97116 GAIT TRAINING THERAPY: CPT

## 2023-11-28 PROCEDURE — 6360000002 HC RX W HCPCS: Performed by: FAMILY MEDICINE

## 2023-11-28 PROCEDURE — 6370000000 HC RX 637 (ALT 250 FOR IP): Performed by: FAMILY MEDICINE

## 2023-11-28 PROCEDURE — 80048 BASIC METABOLIC PNL TOTAL CA: CPT

## 2023-11-28 PROCEDURE — 97530 THERAPEUTIC ACTIVITIES: CPT

## 2023-11-28 PROCEDURE — 2580000003 HC RX 258: Performed by: INTERNAL MEDICINE

## 2023-11-28 PROCEDURE — 2700000000 HC OXYGEN THERAPY PER DAY

## 2023-11-28 PROCEDURE — 36415 COLL VENOUS BLD VENIPUNCTURE: CPT

## 2023-11-28 PROCEDURE — 6360000002 HC RX W HCPCS: Performed by: INTERNAL MEDICINE

## 2023-11-28 PROCEDURE — 94640 AIRWAY INHALATION TREATMENT: CPT

## 2023-11-28 PROCEDURE — A4216 STERILE WATER/SALINE, 10 ML: HCPCS | Performed by: FAMILY MEDICINE

## 2023-11-28 PROCEDURE — C9113 INJ PANTOPRAZOLE SODIUM, VIA: HCPCS | Performed by: FAMILY MEDICINE

## 2023-11-28 PROCEDURE — 2580000003 HC RX 258: Performed by: FAMILY MEDICINE

## 2023-11-28 PROCEDURE — 85025 COMPLETE CBC W/AUTO DIFF WBC: CPT

## 2023-11-28 RX ORDER — AMIODARONE HYDROCHLORIDE 200 MG/1
400 TABLET ORAL DAILY
Status: DISCONTINUED | OUTPATIENT
Start: 2023-11-28 | End: 2023-12-01

## 2023-11-28 RX ORDER — AMIODARONE HYDROCHLORIDE 200 MG/1
400 TABLET ORAL 2 TIMES DAILY
Status: DISCONTINUED | OUTPATIENT
Start: 2023-11-28 | End: 2023-11-28

## 2023-11-28 RX ORDER — METOPROLOL TARTRATE 50 MG/1
50 TABLET, FILM COATED ORAL 2 TIMES DAILY
Status: DISCONTINUED | OUTPATIENT
Start: 2023-11-28 | End: 2023-11-29

## 2023-11-28 RX ADMIN — IPRATROPIUM BROMIDE 0.5 MG: 0.5 SOLUTION RESPIRATORY (INHALATION) at 11:08

## 2023-11-28 RX ADMIN — METOPROLOL TARTRATE 25 MG: 25 TABLET, FILM COATED ORAL at 10:29

## 2023-11-28 RX ADMIN — METOPROLOL TARTRATE 50 MG: 50 TABLET ORAL at 20:34

## 2023-11-28 RX ADMIN — CEFEPIME 2000 MG: 2 INJECTION, POWDER, FOR SOLUTION INTRAVENOUS at 18:04

## 2023-11-28 RX ADMIN — BUDESONIDE INHALATION 500 MCG: 0.5 SUSPENSION RESPIRATORY (INHALATION) at 19:32

## 2023-11-28 RX ADMIN — IPRATROPIUM BROMIDE 0.5 MG: 0.5 SOLUTION RESPIRATORY (INHALATION) at 15:01

## 2023-11-28 RX ADMIN — PANTOPRAZOLE SODIUM 40 MG: 40 INJECTION, POWDER, FOR SOLUTION INTRAVENOUS at 10:23

## 2023-11-28 RX ADMIN — VANCOMYCIN HYDROCHLORIDE 500 MG: 500 INJECTION, POWDER, LYOPHILIZED, FOR SOLUTION INTRAVENOUS at 20:31

## 2023-11-28 RX ADMIN — APIXABAN 5 MG: 5 TABLET, FILM COATED ORAL at 10:22

## 2023-11-28 RX ADMIN — CEFEPIME 2000 MG: 2 INJECTION, POWDER, FOR SOLUTION INTRAVENOUS at 10:28

## 2023-11-28 RX ADMIN — IPRATROPIUM BROMIDE 0.5 MG: 0.5 SOLUTION RESPIRATORY (INHALATION) at 19:32

## 2023-11-28 RX ADMIN — SODIUM CHLORIDE, PRESERVATIVE FREE 10 ML: 5 INJECTION INTRAVENOUS at 20:40

## 2023-11-28 RX ADMIN — CEFEPIME 2000 MG: 2 INJECTION, POWDER, FOR SOLUTION INTRAVENOUS at 02:30

## 2023-11-28 RX ADMIN — BUDESONIDE INHALATION 500 MCG: 0.5 SUSPENSION RESPIRATORY (INHALATION) at 07:27

## 2023-11-28 RX ADMIN — IPRATROPIUM BROMIDE 0.5 MG: 0.5 SOLUTION RESPIRATORY (INHALATION) at 07:27

## 2023-11-28 RX ADMIN — APIXABAN 5 MG: 5 TABLET, FILM COATED ORAL at 20:34

## 2023-11-28 RX ADMIN — Medication 5 MG: at 20:34

## 2023-11-28 RX ADMIN — AMIODARONE HYDROCHLORIDE 400 MG: 200 TABLET ORAL at 18:05

## 2023-11-28 RX ADMIN — SODIUM CHLORIDE, PRESERVATIVE FREE 10 ML: 5 INJECTION INTRAVENOUS at 11:02

## 2023-11-28 RX ADMIN — VANCOMYCIN HYDROCHLORIDE 750 MG: 750 INJECTION, POWDER, LYOPHILIZED, FOR SOLUTION INTRAVENOUS at 10:22

## 2023-11-28 NOTE — PROGRESS NOTES
Physical Therapy    1103: Pt currently receiving breathing tx, will follow up for PT.    1304: Pt on the bed pan, will follow up as schedule permits.

## 2023-11-28 NOTE — PROGRESS NOTES
SW spoke with patient and son at bedside regarding DC plan. Patient agreeable with SNF upon DC. [facility] at this time. Patient in agreement with both. Patient likely to DC 11/29. Son brought in copies of Advanced Directive to add to medical chart. Patient remains on 02 in acute setting. SW to follow.

## 2023-11-28 NOTE — PLAN OF CARE
Problem: Discharge Planning  Goal: Discharge to home or other facility with appropriate resources  Outcome: Progressing  Flowsheets (Taken 11/28/2023 0816)  Discharge to home or other facility with appropriate resources:   Identify barriers to discharge with patient and caregiver   Arrange for needed discharge resources and transportation as appropriate     Problem: Safety - Adult  Goal: Free from fall injury  Outcome: Progressing     Problem: ABCDS Injury Assessment  Goal: Absence of physical injury  Outcome: Progressing

## 2023-11-28 NOTE — PROGRESS NOTES
Hospitalist Progress Note    Patient: Shira Godwin MRN: [id number]  CSN: [id number]    YOB: 1940  Age: 78 y.o. Sex: female    DOA: 11/25/2023 LOS:  LOS: 2 days                Assessment/Plan     Active Hospital Problems    Diagnosis     Atrial fibrillation with RVR ([address]) [I48.91]     Acute hypoxemic respiratory failure (HCC) [J96.01]     Bilateral pneumonia [J18.9]     Chronic atrial fibrillation (HCC) [I48.20]     Acute on chronic congestive heart failure (HCC) [I50.9]     Dementia ([address]) [F03.90]     Pulmonary hypertension (HCC) [I27.20]     UTI (urinary tract infection) [N39.0]         Chief complaint :  SOB  78 y.o. female with h/o atrial fibrillation, hypothyroidism, anemia of chronic disease,CHF, HTN and HLD recently treated inpateint at another [facility] and discharged tto SNF on 11/15/23. Presents today with acute respiratory distress with SOB. Found to be hypoxuic on arrival of EMS. Admitted for acute hypoxemic failure, bilateral pneumonia/HCAP, and possible UTI. Had sustained V-tach last night  Amiodarone given  Cardiology consulted. Acute hypoxemic respiratory failure  Bilateral pneumonia -treat as HCAP  Acute on chronic CHF  Possible UTI  Chronic dementia   Pulmonary hypertension   Acute on chronic atrial fibrillation   Pulmonary hypertension     Continue IV antibiotics, IV vancomycin and IV cefepime  Pharmacy managing vanco dosing Urine Legionella and pneumococcal antigen-negative  Keep 02 sats >92%  Weaned off bipap, currently on 6L 02  proBNP elevated   Recent echo done >normal EF, pulm htn  Bronchodilators and budesonide nebs        Continue metoprolol   Eliquis 5mg po bid     Disposition : TBD    Review of systems  General: No fevers or chills. Cardiovascular: No chest pain or pressure. No palpitations. Pulmonary: No shortness of breath. Gastrointestinal: No nausea, vomiting. Physical Exam:  General: Awake, cooperative, no acute distress    HEENT: NC, Atraumatic.

## 2023-11-28 NOTE — PROGRESS NOTES
Hospitalist Progress Note    Patient: Carolyn Matt MRN: [id number]  CSN: [id number]    YOB: 1940  Age: 78 y.o. Sex: female    DOA: 11/25/2023 LOS:  LOS: 3 days                Assessment/Plan     Active Hospital Problems    Diagnosis     Sustained ventricular tachycardia (HCC) [I47.20]     Atrial fibrillation with RVR (HCC) [I48.91]     Acute hypoxemic respiratory failure (HCC) [J96.01]     Bilateral pneumonia [J18.9]     Chronic atrial fibrillation (HCC) [I48.20]     Acute on chronic congestive heart failure (HCC) [I50.9]     Dementia ([address]) [F03.90]     Pulmonary hypertension (HCC) [I27.20]     UTI (urinary tract infection) [N39.0]         Chief complaint :  SOB  78 y.o. female with h/o atrial fibrillation, hypothyroidism, anemia of chronic disease,CHF, HTN and HLD recently treated inpateint at another [facility] and discharged to SNF on 11/15/23. Presents today with acute respiratory distress with SOB. Found to be hypoxuic on arrival of EMS. Admitted for acute hypoxemic failure, bilateral pneumonia/HCAP, and possible UTI. Had episode of sustained V-tach, she was asymptomatic. On Amiodarone  Cardiology consulted. On metoprolol  Anticoagulation with eliquis      Acute hypoxemic respiratory failure  Bilateral pneumonia -treat as HCAP  Acute on chronic CHF  Possible UTI  Chronic dementia   Pulmonary hypertension   Acute on chronic atrial fibrillation   Pulmonary hypertension     Continue IV antibiotics, IV vancomycin and IV cefepime  Pharmacy managing vanco dosing Urine Legionella and pneumococcal antigen-negative  Keep 02 sats >92%  Weaned off bipap, currently on 6L 02  proBNP elevated   Recent echo done >normal EF, pulm htn  Bronchodilators and budesonide nebs            Disposition : TBD    Review of systems  General: No fevers or chills. Cardiovascular: No chest pain or pressure. No palpitations. Pulmonary: No shortness of breath. Gastrointestinal: No nausea, vomiting.      Physical

## 2023-11-28 NOTE — PROGRESS NOTES
Palliative Medicine   [address]   [facility] [phone]     CODE STATUS: DNR / DNI     AMD:  AMD: NO AMD on file. Requested family member provide copy. All children are joint  decision makers until AMD received. This LMSW attempted to meet with pt at bedside for follow up visit. Upon entry, pt laying in bed with head elevated, receiving O2 via nasal canula. Pt resting comfortably. She was not awakened. LMSW made telephone contact with pt's son, Cliff Griffith at [phone], to ask about MPoA document. He reports he has it and is enroute to hospital. LMSW informed him, he can notify RN and ask them to notify Palliative team of his arrival, to obtain a copy of MPoA. Pt's son verbalized understanding, reports he will do so. LMSW will follow up upon notification of arrival.     Thank you for this referral to Palliative Care. The palliative care team remains available to provide support to patient and their family.       Darwin Senior LMSW, APHSW-C  Palliative Medicine Inpatient   [facility]  [phone]   [facility]   Palliative COPE Line: [phone]

## 2023-11-29 ENCOUNTER — APPOINTMENT (OUTPATIENT)
Facility: HOSPITAL | Age: 83
DRG: 193 | End: 2023-11-29
Payer: MEDICARE

## 2023-11-29 LAB
ANION GAP SERPL CALC-SCNC: 6 MMOL/L (ref 3–18)
BUN SERPL-MCNC: 11 MG/DL (ref 7–18)
BUN/CREAT SERPL: 13 (ref 12–20)
CALCIUM SERPL-MCNC: 8.7 MG/DL (ref 8.5–10.1)
CHLORIDE SERPL-SCNC: 108 MMOL/L (ref 100–111)
CO2 SERPL-SCNC: 23 MMOL/L (ref 21–32)
CREAT SERPL-MCNC: 0.86 MG/DL (ref 0.6–1.3)
GLUCOSE BLD STRIP.AUTO-MCNC: 130 MG/DL (ref 70–110)
GLUCOSE SERPL-MCNC: 114 MG/DL (ref 74–99)
POTASSIUM SERPL-SCNC: 3.9 MMOL/L (ref 3.5–5.5)
SODIUM SERPL-SCNC: 137 MMOL/L (ref 136–145)

## 2023-11-29 PROCEDURE — 6360000002 HC RX W HCPCS: Performed by: INTERNAL MEDICINE

## 2023-11-29 PROCEDURE — 1100000003 HC PRIVATE W/ TELEMETRY

## 2023-11-29 PROCEDURE — 2580000003 HC RX 258: Performed by: FAMILY MEDICINE

## 2023-11-29 PROCEDURE — 2580000003 HC RX 258: Performed by: INTERNAL MEDICINE

## 2023-11-29 PROCEDURE — 2700000000 HC OXYGEN THERAPY PER DAY

## 2023-11-29 PROCEDURE — C9113 INJ PANTOPRAZOLE SODIUM, VIA: HCPCS | Performed by: FAMILY MEDICINE

## 2023-11-29 PROCEDURE — 6370000000 HC RX 637 (ALT 250 FOR IP): Performed by: FAMILY MEDICINE

## 2023-11-29 PROCEDURE — 82962 GLUCOSE BLOOD TEST: CPT

## 2023-11-29 PROCEDURE — 6370000000 HC RX 637 (ALT 250 FOR IP): Performed by: INTERNAL MEDICINE

## 2023-11-29 PROCEDURE — 94640 AIRWAY INHALATION TREATMENT: CPT

## 2023-11-29 PROCEDURE — 36415 COLL VENOUS BLD VENIPUNCTURE: CPT

## 2023-11-29 PROCEDURE — 80048 BASIC METABOLIC PNL TOTAL CA: CPT

## 2023-11-29 PROCEDURE — 6360000002 HC RX W HCPCS: Performed by: FAMILY MEDICINE

## 2023-11-29 PROCEDURE — 71045 X-RAY EXAM CHEST 1 VIEW: CPT

## 2023-11-29 PROCEDURE — A4216 STERILE WATER/SALINE, 10 ML: HCPCS | Performed by: FAMILY MEDICINE

## 2023-11-29 RX ORDER — PANTOPRAZOLE SODIUM 40 MG/1
40 TABLET, DELAYED RELEASE ORAL
Status: DISCONTINUED | OUTPATIENT
Start: 2023-11-30 | End: 2023-12-06 | Stop reason: HOSPADM

## 2023-11-29 RX ORDER — METOPROLOL TARTRATE 50 MG/1
100 TABLET, FILM COATED ORAL 2 TIMES DAILY
Status: DISCONTINUED | OUTPATIENT
Start: 2023-11-29 | End: 2023-12-06 | Stop reason: HOSPADM

## 2023-11-29 RX ADMIN — IPRATROPIUM BROMIDE 0.5 MG: 0.5 SOLUTION RESPIRATORY (INHALATION) at 15:25

## 2023-11-29 RX ADMIN — IPRATROPIUM BROMIDE AND ALBUTEROL SULFATE 1 DOSE: 2.5; .5 SOLUTION RESPIRATORY (INHALATION) at 23:42

## 2023-11-29 RX ADMIN — SODIUM CHLORIDE, PRESERVATIVE FREE 10 ML: 5 INJECTION INTRAVENOUS at 08:52

## 2023-11-29 RX ADMIN — MEROPENEM 1000 MG: 1 INJECTION, POWDER, FOR SOLUTION INTRAVENOUS at 18:25

## 2023-11-29 RX ADMIN — APIXABAN 5 MG: 5 TABLET, FILM COATED ORAL at 08:48

## 2023-11-29 RX ADMIN — BUDESONIDE INHALATION 500 MCG: 0.5 SUSPENSION RESPIRATORY (INHALATION) at 07:15

## 2023-11-29 RX ADMIN — Medication 5 MG: at 21:11

## 2023-11-29 RX ADMIN — APIXABAN 5 MG: 5 TABLET, FILM COATED ORAL at 21:11

## 2023-11-29 RX ADMIN — SODIUM CHLORIDE, PRESERVATIVE FREE 10 ML: 5 INJECTION INTRAVENOUS at 21:16

## 2023-11-29 RX ADMIN — CEFEPIME 2000 MG: 2 INJECTION, POWDER, FOR SOLUTION INTRAVENOUS at 02:34

## 2023-11-29 RX ADMIN — IPRATROPIUM BROMIDE 0.5 MG: 0.5 SOLUTION RESPIRATORY (INHALATION) at 07:15

## 2023-11-29 RX ADMIN — METOPROLOL TARTRATE 100 MG: 50 TABLET ORAL at 21:11

## 2023-11-29 RX ADMIN — BUDESONIDE INHALATION 500 MCG: 0.5 SUSPENSION RESPIRATORY (INHALATION) at 19:45

## 2023-11-29 RX ADMIN — IPRATROPIUM BROMIDE 0.5 MG: 0.5 SOLUTION RESPIRATORY (INHALATION) at 11:13

## 2023-11-29 RX ADMIN — VANCOMYCIN HYDROCHLORIDE 500 MG: 500 INJECTION, POWDER, LYOPHILIZED, FOR SOLUTION INTRAVENOUS at 08:48

## 2023-11-29 RX ADMIN — METOPROLOL TARTRATE 50 MG: 50 TABLET ORAL at 08:48

## 2023-11-29 RX ADMIN — AMIODARONE HYDROCHLORIDE 400 MG: 200 TABLET ORAL at 08:48

## 2023-11-29 RX ADMIN — IPRATROPIUM BROMIDE 0.5 MG: 0.5 SOLUTION RESPIRATORY (INHALATION) at 19:45

## 2023-11-29 RX ADMIN — MEROPENEM 1000 MG: 1 INJECTION, POWDER, FOR SOLUTION INTRAVENOUS at 12:11

## 2023-11-29 RX ADMIN — PANTOPRAZOLE SODIUM 40 MG: 40 INJECTION, POWDER, FOR SOLUTION INTRAVENOUS at 08:48

## 2023-11-29 ASSESSMENT — PAIN SCALES - GENERAL: PAINLEVEL_OUTOF10: 0

## 2023-11-29 NOTE — PROGRESS NOTES
MD rounded and spoke with son and patient at bedside. MD explained medical plan to son and anticipates that patient will be in acute setting for 1-2 more days. Patient needing SNF upon DC at this time. Patient prefers a private room at this time vs semi-private. [facility], and [facility] all have accepted patient. SW to follow to assist with DC planning needs.

## 2023-11-29 NOTE — PROGRESS NOTES
[address] [address] [address] [facility]. [phone]     CODE STATUS:  DNR / DNI. DDNR on file. AMD:  Newly obtained AMD on file. MPoAs listed below. Contact information updated in [address] [place] [address] tab or EHR. Palliative team, Mati Tobar RN and this LMSW attempted to meet with pt for follow up visit. At time of visit, pt being assisted in the bathroom. LMSW checked pt's chart and obtained MPoA, document provided by pt's son. Per the MPoA:      Primary MPoA:  Mendez Estrella and Grazyna Owens. [place], either can work independently as primary agent. Secondary MPoA:  Shree Cid     Tertiary MPoA:  Oralee Comment. Malu Kapoor    Pt is anticipated to leave for [employer] today. Thank you for this referral to Palliative care. Goals of care and surrogate decision makers have been addressed. All palliative care needs have been addressed at this time. Please reconsult if additional palliative needs arise.       Suzy Johnson LMSW, APHSW-C  Palliative Medicine Inpatient   [facility]  [phone]  [facility]   Palliative COPE Line: [phone])

## 2023-11-29 NOTE — PROGRESS NOTES
Pulmonary Specialists  Pulmonary, Critical Care, and Sleep Medicine    Name: Radha Gonsalez MRN: [id number]   : 1940 Hospital: [facility]    Date: 2023  Room: [address] [place] [address] Note                                                                             Consult requesting physician: Kristie Duran MD  Reason for Consult: Acute respiratory failure    IMPRESSION:     Acute respiratory failure with hypoxia  Bilateral pneumonia-treat as HCAP  Chronic atrial fibrillation  Chronic heart failure    Patient Active Problem List   Diagnosis Code    Acute hypoxemic respiratory failure (HCC) J96.01    Bilateral pneumonia J18.9    Chronic atrial fibrillation (HCC) I48.20    Acute on chronic congestive heart failure (HCC) I50.9    Dementia ([address]) F03.90    Pulmonary hypertension ([facility]) I27.20    UTI (urinary tract infection) N39.0    Atrial fibrillation with RVR ([address]) I48.91    Sustained ventricular tachycardia ([facility]) I47.20       Code status: DNR       RECOMMENDATIONS:     Respiratory: Patient appears to have combination of pneumonia and CHF, with history of pulm hypertension. Patient not needing BiPAP anymore. Nasal cannula oxygen-currently at 5 L; recommend incentive spirometry and wean FiO2. Bronchodilators: Continue budesonide nebs twice daily, ipratropium nebs 4 times daily. Avoid LABA due to A-fib. Steroids: Not indicated. O2: Continue 5 LPM O2 NS; titrate to keep SpO2 more than 91%. Antibiotics as below. CT chest images 23 reviewed-right-sided multilobar pneumonia with right lower lobe consolidation; small right basal effusion; no central or segmental PEs reported; cardiomegaly with pulmonary hypertension findings. CXR 2023: Mildly improved bilateral pulmonary infiltrates. Bilateral lower lobe linear atelectasis. No significant pleural effusion. Repeat CXR ordered.   She is still not using incentive spirometry; advised and educated to use incentive interstitial edema and scattered consolidative and groundglass opacities scattered in the right lung, likely representing alveolar edema, though difficult to exclude infection. 3. Small right pleural effusion. Scattered areas of atelectasis in the lungs, worst in the right lower lobe. 4. Cardiomegaly with severe right atrial enlargement and reflux of contrast material into dilated IVC and hepatic veins, suggestive of right heart failure. 5. Cholelithiasis. XR CHEST PORTABLE  Result Date: 11/25/2023  EXAM:  XR CHEST PORTABLE INDICATION: Shortness of breath COMPARISON: March 8, 2022 TECHNIQUE: portable chest AP view FINDINGS: The cardiac silhouette is enlarged as before. Elevation of the right hemidiaphragm with increased vascular markings at both lung bases once again seen. IMPRESSION cardiomegaly with interstitial prominence mainly right lung base. XR CHEST PORTABLE  Result Date: 11/26/2023  COMPARISON: 11/25/2023   FINDINGS:  AP portable upright view of the chest demonstrates a stable enlarged cardiopericardial silhouette. Scattered interstitial and parenchymal opacities are unchanged. Trace right effusion. There is no pneumothorax. . Patient is on a cardiac monitor. IMPRESSION:  No significant interval change. Cardiomegaly and scattered interstitial/parenchymal opacities. Echocardiogram Complete-[facility]  Result Date: 11/17/2023  CONCLUSIONS     * Technically difficult study. * Left ventricular systolic function is normal with an ejection fraction of 60 % by visual estimation. * Left ventricular chamber size is normal.     * Mild concentric left ventricular hypertrophy. * Left ventricular diastolic function: indeterminate. * Right ventricular systolic function is normal.     * Right ventricular chamber dimension is visually mildly dilated. * Left atrial chamber is moderately enlarged with a left atrial volume index biplane method of disk (BP MOD) of 44 ml/m^2.      * Right atrial

## 2023-11-29 NOTE — PROGRESS NOTES
Physical Therapy      1302: Xray arrived to room, will follow up.    1422: Pt sound asleep, did not arouse, will follow up as schedule permits.

## 2023-11-29 NOTE — CONSULTS
clubbing, cyanosis; no joint effusions or swelling;  muscle mass appropriate for age   Neurologic:  No gross focal sensory abnormalities;  Cranial nerves intact   Psychiatric:   appropriate and interactive. Labs: Results:   Chemistry Recent Labs     11/27/23 0442 11/28/23 0312 11/29/23  0344    138 137   K 3.5 3.5 3.9    105 108   CO2 29 25 23   BUN 15 11 11      CBC w/Diff Recent Labs     11/27/23 0442 11/28/23 0312   WBC 11.9 11.9   RBC 3.76* 3.80*   HGB 11.4* 11.9*   HCT 35.7 36.3    242            No results found for: \"SDES\" No components found for: \"CULT\"     Results       Procedure Component Value Units Date/Time    Culture, MRSA, Screening [[id number]] Collected: 11/25/23 1300    Order Status: Canceled Specimen: Nares     Sputum gram stain [[id number]] Collected: 11/25/23 1100    Order Status: Canceled Specimen: Sputum Expectorated     Culture, Respiratory [[id number]] Collected: 11/25/23 1000    Order Status: Completed Specimen: Sputum Expectorated Updated: 11/28/23 0851     Special Requests NO SPECIAL REQUESTS        Gram stain 1+ WBCS SEEN         NO EPITHELIAL CELLS SEEN               4+ GRAM POSITIVE COCCI IN PAIRS CHAINS AND CLUSTERS            2+ Gram negative rods         OCCASIONAL BUDDING YEAST        Culture       HEAVY NORMAL RESPIRATORY RENATO          COVID-19 & Influenza Combo [[id number]] Collected: 11/25/23 2201    Order Status: Completed Specimen: Nasopharyngeal Updated: 11/25/23 1040     SARS-CoV-2, PCR Not detected        Comment: Not Detected results do not preclude SARS-CoV-2 infection and should not be used as the sole basis for patient management decisions. Results must be combined with clinical observations, patient history, and epidemiological information. Rapid Influenza A By PCR Not detected        Rapid Influenza B By PCR Not detected        Comment: Testing was performed using kelsie Kiley SARS-CoV-2 and Influenza A/B nucleic acid assay.   This test is a multiplex Real-Time Reverse Transcriptase Polymerase Chain Reaction (RT-PCR) based in vitro diagnostic test intended for the qualitative detection of nucleic acids from SARS-CoV-2, Influenza A, and Influenza B in nasopharyngeal for use under the FDA's Emergency Use Authorization(EAU) only. Fact sheet for Patients: FindDrives.pl  Fact sheet for Healthcare Providers: FindDrives.pl         Legionella antigen, urine [[id number]] Collected: 11/25/23 0740    Order Status: Completed Specimen: Urine, random Updated: 11/25/23 2124     Legionella Antigen, Urine Negative       Strep Pneumoniae Antigen [[id number]] Collected: 11/25/23 0740    Order Status: Completed Specimen: Urine, random Updated: 11/25/23 2124     STREP PNEUMONIAE ANTIGEN, URINE Negative       Culture, Urine [[id number]]  (Abnormal)  (Susceptibility) Collected: 11/25/23 0740    Order Status: Completed Specimen: Urine, clean catch Updated: 11/28/23 1610     Special Requests NO SPECIAL REQUESTS        [place] count --        >100,000  COLONIES/mL       Culture       Escherichia coli ** (EXTENDED SPECTRUM BETA LACTAMASE ) **            (NOTE) ESBL CALLED TO AND READ BACK BY Steffanie Maria AT 1610 ON 11/28/23.  LR    Susceptibility        Escherichia coli      BACTERIAL SUSCEPTIBILITY PANEL CLEO      amikacin <=2 ug/mL Sensitive      ampicillin >=32 ug/mL Resistant      ampicillin-sulbactam 16 ug/mL Intermediate      ceFAZolin >=64 ug/mL Resistant      cefepime >=64 ug/mL Resistant      cefTAZidime >=64 ug/mL Resistant      cefTRIAXone >=64 ug/mL Resistant      ciprofloxacin 1 ug/mL Sensitive  [1]       gentamicin <=1 ug/mL Sensitive      levofloxacin 1 ug/mL Sensitive  [1]       meropenem <=0.25 ug/mL Sensitive      nitrofurantoin <=16 ug/mL Sensitive      piperacillin-tazobactam <=4 ug/mL Sensitive      tobramycin <=1 ug/mL Sensitive      trimethoprim-sulfamethoxazole <=20 ug/mL Sensitive [1]  **FDA INTERPRETATION REFLECTED, REFER TO CLSI FOR ALTERNATE INTERPRETATIONS. **                   Blood Culture 2 [[id number]] Collected: 11/25/23 0734    Order Status: Completed Specimen: Blood Updated: 11/29/23 0752     Special Requests NO SPECIAL REQUESTS        Culture NO GROWTH 4 DAYS       Blood Culture 1 [[id number]] Collected: 11/25/23 0648    Order Status: Completed Specimen: Blood Updated: 11/29/23 0752     Special Requests NO SPECIAL REQUESTS        Culture NO GROWTH 4 DAYS                 Imaging: All imaging reviewed from Admission to present as per radiology interpretation in [facility]    Radiology report last 24 hours:    Echo (TTE) limited (PRN contrast/bubble/strain/3D)    Result Date: 11/27/2023    Left Ventricle: Moderately reduced left ventricular systolic function with a visually estimated EF of 40%. Left ventricle size is normal. Normal wall thickness. Unable to assess wall motion. Right Ventricle: Not assessed due to poor image quality. Right ventricle is mildly dilated. Aortic Valve: Not assessed due to poor image quality. Trileaflet valve. Thickened cusp. Calcified cusp. Mitral Valve: Mild regurgitation. Tricuspid Valve: Moderate regurgitation. Right Atrium: Right atrium is mildly dilated. Contrast used: Definity. Mustapha Sharp MD  [facility])  Office #:     [phone]  [id number] [phone]   Office Fax: [phone]

## 2023-11-29 NOTE — CONSULTS
HCAP  Acute hypoxic respiratory failure    Right heart failure    E.coli bacteruria-- 11/15 post treatment. This admission, ESBL E.coli  Resp/blood culture: negative    Cefepime switched to meropenem today    Suggest:  Cont meropenem while inpatient  Can DC vanco    Full note to follow  Thanks    Bekah Montano MD  [facility])  Office: [phone] -Option #8  Office fax:  [phone]

## 2023-11-30 LAB
ANION GAP SERPL CALC-SCNC: 4 MMOL/L (ref 3–18)
BUN SERPL-MCNC: 12 MG/DL (ref 7–18)
BUN/CREAT SERPL: 14 (ref 12–20)
CALCIUM SERPL-MCNC: 9 MG/DL (ref 8.5–10.1)
CHLORIDE SERPL-SCNC: 108 MMOL/L (ref 100–111)
CO2 SERPL-SCNC: 24 MMOL/L (ref 21–32)
CREAT SERPL-MCNC: 0.86 MG/DL (ref 0.6–1.3)
GLUCOSE SERPL-MCNC: 108 MG/DL (ref 74–99)
POTASSIUM SERPL-SCNC: 4.2 MMOL/L (ref 3.5–5.5)
PROCALCITONIN SERPL-MCNC: <0.05 NG/ML
SODIUM SERPL-SCNC: 136 MMOL/L (ref 136–145)

## 2023-11-30 PROCEDURE — 1100000003 HC PRIVATE W/ TELEMETRY

## 2023-11-30 PROCEDURE — 6370000000 HC RX 637 (ALT 250 FOR IP): Performed by: INTERNAL MEDICINE

## 2023-11-30 PROCEDURE — 6360000002 HC RX W HCPCS: Performed by: INTERNAL MEDICINE

## 2023-11-30 PROCEDURE — 6370000000 HC RX 637 (ALT 250 FOR IP): Performed by: FAMILY MEDICINE

## 2023-11-30 PROCEDURE — 2500000003 HC RX 250 WO HCPCS: Performed by: INTERNAL MEDICINE

## 2023-11-30 PROCEDURE — 92526 ORAL FUNCTION THERAPY: CPT

## 2023-11-30 PROCEDURE — 84145 PROCALCITONIN (PCT): CPT

## 2023-11-30 PROCEDURE — 94640 AIRWAY INHALATION TREATMENT: CPT

## 2023-11-30 PROCEDURE — 36415 COLL VENOUS BLD VENIPUNCTURE: CPT

## 2023-11-30 PROCEDURE — 2580000003 HC RX 258: Performed by: FAMILY MEDICINE

## 2023-11-30 PROCEDURE — 2700000000 HC OXYGEN THERAPY PER DAY

## 2023-11-30 PROCEDURE — 2580000003 HC RX 258: Performed by: INTERNAL MEDICINE

## 2023-11-30 PROCEDURE — 80048 BASIC METABOLIC PNL TOTAL CA: CPT

## 2023-11-30 RX ADMIN — AMIODARONE HYDROCHLORIDE 400 MG: 200 TABLET ORAL at 09:43

## 2023-11-30 RX ADMIN — SODIUM CHLORIDE, PRESERVATIVE FREE 10 ML: 5 INJECTION INTRAVENOUS at 21:41

## 2023-11-30 RX ADMIN — MEROPENEM 1000 MG: 1 INJECTION, POWDER, FOR SOLUTION INTRAVENOUS at 18:46

## 2023-11-30 RX ADMIN — Medication 5 MG: at 21:36

## 2023-11-30 RX ADMIN — IPRATROPIUM BROMIDE 0.5 MG: 0.5 SOLUTION RESPIRATORY (INHALATION) at 06:50

## 2023-11-30 RX ADMIN — SODIUM CHLORIDE, PRESERVATIVE FREE 10 ML: 5 INJECTION INTRAVENOUS at 09:40

## 2023-11-30 RX ADMIN — PANTOPRAZOLE SODIUM 40 MG: 40 TABLET, DELAYED RELEASE ORAL at 06:54

## 2023-11-30 RX ADMIN — APIXABAN 5 MG: 5 TABLET, FILM COATED ORAL at 09:43

## 2023-11-30 RX ADMIN — BUDESONIDE INHALATION 500 MCG: 0.5 SUSPENSION RESPIRATORY (INHALATION) at 07:09

## 2023-11-30 RX ADMIN — METOPROLOL TARTRATE 100 MG: 50 TABLET ORAL at 21:36

## 2023-11-30 RX ADMIN — MEROPENEM 1000 MG: 1 INJECTION, POWDER, FOR SOLUTION INTRAVENOUS at 09:43

## 2023-11-30 RX ADMIN — IPRATROPIUM BROMIDE AND ALBUTEROL SULFATE 1 DOSE: 2.5; .5 SOLUTION RESPIRATORY (INHALATION) at 06:50

## 2023-11-30 RX ADMIN — APIXABAN 5 MG: 5 TABLET, FILM COATED ORAL at 21:36

## 2023-11-30 RX ADMIN — IPRATROPIUM BROMIDE 0.5 MG: 0.5 SOLUTION RESPIRATORY (INHALATION) at 15:07

## 2023-11-30 RX ADMIN — GUAIFENESIN 200 MG: 200 SOLUTION ORAL at 18:56

## 2023-11-30 RX ADMIN — IPRATROPIUM BROMIDE 0.5 MG: 0.5 SOLUTION RESPIRATORY (INHALATION) at 19:19

## 2023-11-30 RX ADMIN — MEROPENEM 1000 MG: 1 INJECTION, POWDER, FOR SOLUTION INTRAVENOUS at 01:59

## 2023-11-30 RX ADMIN — IPRATROPIUM BROMIDE 0.5 MG: 0.5 SOLUTION RESPIRATORY (INHALATION) at 10:48

## 2023-11-30 RX ADMIN — BUDESONIDE INHALATION 500 MCG: 0.5 SUSPENSION RESPIRATORY (INHALATION) at 19:18

## 2023-11-30 RX ADMIN — SERTRALINE 50 MG: 50 TABLET, FILM COATED ORAL at 23:15

## 2023-11-30 RX ADMIN — METOPROLOL TARTRATE 100 MG: 50 TABLET ORAL at 09:43

## 2023-11-30 NOTE — PLAN OF CARE
Problem: Discharge Planning  Goal: Discharge to home or other facility with appropriate resources  Outcome: Progressing  Flowsheets (Taken 11/29/2023 2000)  Discharge to home or other facility with appropriate resources:   Identify barriers to discharge with patient and caregiver   Identify discharge learning needs (meds, wound care, etc)     Problem: Safety - Adult  Goal: Free from fall injury  Outcome: Progressing     Problem: ABCDS Injury Assessment  Goal: Absence of physical injury  Outcome: Progressing     Problem: Chronic Conditions and Co-morbidities  Goal: Patient's chronic conditions and co-morbidity symptoms are monitored and maintained or improved  Outcome: Progressing  Flowsheets (Taken 11/29/2023 2000)  Care Plan - Patient's Chronic Conditions and Co-Morbidity Symptoms are Monitored and Maintained or Improved:   Monitor and assess patient's chronic conditions and comorbid symptoms for stability, deterioration, or improvement   Collaborate with multidisciplinary team to address chronic and comorbid conditions and prevent exacerbation or deterioration   Update acute care plan with appropriate goals if chronic or comorbid symptoms are exacerbated and prevent overall improvement and discharge     Problem: Pain  Goal: Verbalizes/displays adequate comfort level or baseline comfort level  Outcome: Progressing  Flowsheets (Taken 11/29/2023 2000)  Verbalizes/displays adequate comfort level or baseline comfort level:   Encourage patient to monitor pain and request assistance   Assess pain using appropriate pain scale   Administer analgesics based on type and severity of pain and evaluate response   Implement non-pharmacological measures as appropriate and evaluate response

## 2023-11-30 NOTE — PROGRESS NOTES
Physical Therapy    1206: Pt eating lunch, will follow up for PT.    1250: Pt refusing PT, reports she is having a BM and needs to be cleaned up, notified CNA. 1408: Pt is asleep, did not arouse, will follow up as schedule permits.

## 2023-11-30 NOTE — PROGRESS NOTES
Comprehensive Nutrition Assessment      Type and Reason for Visit:  Initial, RD Nutrition Re-Screen/LOS    Nutrition Recommendations/Plan:   Diet as ordered  Monitor and record daily meals intake   Obtain new wt when feasible     Malnutrition Assessment:  Malnutrition Status: At risk for malnutrition (Comment) (chroncic Afib and delcined mental status) (11/30/23 1420)    Context:  Chronic Illness     Findings of the 6 clinical characteristics of malnutrition:  Energy Intake:  Unable to assess  Weight Loss:  Unable to assess     Body Fat Loss:  Mild body fat loss Orbital   Muscle Mass Loss:  Unable to assess    Fluid Accumulation:  Mild (+1) Generalized   Strength:  Normal  strength    Nutrition Assessment:       Pina Shah is a 78 y.o. female  admitted on 11/25 for Respiratory Distress/ Hx of Atrial fibrillation, dementia, chronic anemia, CHF, HTN, HLD, and Hypothyroidism. Per visit pt is sitting in bed, alert and oriented x3. Significant wt change noticed: +8 % * 1 month (compared to wt noted on 10/27/23). Question current wt accuracy. Current on General diet -  po intake varies per %. Last BM (including prior to admit): 11/29/23  Edema: Generalized  Edema Generalized: +1            11/29/2023    11:55 PM 11/28/2023    11:27 PM 11/27/2023     4:26 PM 11/27/2023    12:02 AM 11/25/2023    11:52 PM 11/25/2023     6:28 AM 3/8/2022     4:43 PM   Weight Metrics   Weight 240 lb 1.3 oz 232 lb 5.8 oz 227 lb 226 lb 10.1 oz 235 lb 3.7 oz 180 lb 170 lb   BMI (Calculated) 37.7 kg/m2 36.5 kg/m2 35.6 kg/m2 35.6 kg/m2 36.9 kg/m2 0 kg/m2 26.7 kg/m2         Nutrition Related Findings:    Pertinent meds: lopressor, NaCl flushes. Pertinent labs: Glu 108   Wound Type: Pressure Injury, Stage I (on coccyx)       Current Nutrition Intake & Therapies:    Average Meal Intake: 51-75%  Average Supplements Intake: None Ordered  ADULT DIET;  Regular; Low Fat/Low Chol/High Fiber/2 gm Na     Anthropometric Measures:  Height:

## 2023-11-30 NOTE — PROGRESS NOTES
[facility]ists  Pulmonary, Critical Care, and Sleep Medicine    Name: Jessica Good MRN: [id number]   : 1940 Hospital: [facility]    Date: 2023  Room: [address] [place] [address] Note                                                                             Consult requesting physician: Carla Morfin MD  Reason for Consult: Acute respiratory failure    IMPRESSION:     Acute respiratory failure with hypoxia  Bilateral pneumonia-treat as HCAP  Chronic atrial fibrillation  Chronic heart failure    Patient Active Problem List   Diagnosis Code    Acute hypoxemic respiratory failure (HCC) J96.01    Bilateral pneumonia J18.9    Chronic atrial fibrillation (HCC) I48.20    Acute on chronic congestive heart failure (HCC) I50.9    Dementia ([address]) F03.90    Pulmonary hypertension (HCC) I27.20    UTI (urinary tract infection) N39.0    Atrial fibrillation with RVR ([address]) I48.91    Sustained ventricular tachycardia (HCC) I47.20       Code status: DNR       RECOMMENDATIONS:     Respiratory: Patient appears to have combination of pneumonia and CHF, with history of pulm hypertension. Patient not needing BiPAP anymore. Bronchodilators: Continue budesonide nebs twice daily, ipratropium nebs 4 times daily. Avoid LABA due to A-fib. Steroids: Not indicated. O2: Continue 5 LPM O2 NS; titrate to keep SpO2 more than 91%. Antibiotics as below. CT chest images 23 reviewed-right-sided multilobar pneumonia with right lower lobe consolidation; small right basal effusion; no central or segmental PEs reported; cardiomegaly with pulmonary hypertension findings. CXR 2023: Mildly improved bilateral pulmonary infiltrates. Bilateral lower lobe linear atelectasis. No significant pleural effusion. Repeat CXR 2023: Worsening right basilar airspace disease. She still not using incentive spirometer and Aerobika flutter valve. Advised to use it frequently.   Will start patient on lungs, and patchy areas of consolidative and groundglass opacities in the right lung, greatest in the posterior right upper lobe. There is a small right pleural effusion and with overlying right lower lobe atelectasis, as well as subsegmental atelectasis in the right upper and middle lobes, and in the lingula and left lower lobe. No pneumothorax. Axilla/Soft Tissue: No pathologic axillary adenopathy. Incidental calcified right thyroid nodule measuring 15 mm. Mediastinum: Cardiomegaly with severe right atrial enlargement, and reflux of contrast material into dilated IVC and hepatic veins. No pericardial effusion. Coronary artery calcium: Absent. No pathologic mediastinal or hilar adenopathy. Upper Abdomen: Few small layering gallstones noted. Mild calcific atherosclerosis of the upper abdominal aorta. Bones: No evidence of acute fracture, dislocation, or aggressive osseous abnormality. Moderate degenerative disc disease in the thoracic spine. 1. No evidence of pulmonary embolism. Enlarged pulmonary arteries, consistent with pulmonary arterial hypertension. 2. Pulmonary interstitial edema and scattered consolidative and groundglass opacities scattered in the right lung, likely representing alveolar edema, though difficult to exclude infection. 3. Small right pleural effusion. Scattered areas of atelectasis in the lungs, worst in the right lower lobe. 4. Cardiomegaly with severe right atrial enlargement and reflux of contrast material into dilated IVC and hepatic veins, suggestive of right heart failure. 5. Cholelithiasis. XR CHEST PORTABLE  Result Date: 11/25/2023  EXAM:  XR CHEST PORTABLE INDICATION: Shortness of breath COMPARISON: March 8, 2022 TECHNIQUE: portable chest AP view FINDINGS: The cardiac silhouette is enlarged as before. Elevation of the right hemidiaphragm with increased vascular markings at both lung bases once again seen.  IMPRESSION cardiomegaly with interstitial prominence mainly right lung base.    XR CHEST PORTABLE  Result Date: 11/26/2023  COMPARISON: 11/25/2023   FINDINGS:  AP portable upright view of the chest demonstrates a stable enlarged cardiopericardial silhouette. Scattered interstitial and parenchymal opacities are unchanged. Trace right effusion. There is no pneumothorax. . Patient is on a cardiac monitor. IMPRESSION:  No significant interval change. Cardiomegaly and scattered interstitial/parenchymal opacities. Echocardiogram Complete-[facility]  Result Date: 11/17/2023  CONCLUSIONS     * Technically difficult study. * Left ventricular systolic function is normal with an ejection fraction of 60 % by visual estimation. * Left ventricular chamber size is normal.     * Mild concentric left ventricular hypertrophy. * Left ventricular diastolic function: indeterminate. * Right ventricular systolic function is normal.     * Right ventricular chamber dimension is visually mildly dilated. * Left atrial chamber is moderately enlarged with a left atrial volume index biplane method of disk (BP MOD) of 44 ml/m^2. * Right atrial chamber size is enlarged. * The aortic root at the sinus of Valsalva is normal measuring 3.16 cm with an index of 1.4. * There is mild mitral valve regurgitation. * There is mild tricuspid valve regurgitation. * Mild pulmonary hypertension, estimated pulmonary arterial systolic pressure is 49 mmHg. Comparison     * Compared to prior study from 02/27/2022 EF was 71% with normal RV and RA size. PAP was 30 mmHg. CXR 11/29/2023: Worsening right basilar airspace disease. Please note: Voice-recognition software may have been used to generate this report, which may have resulted in some phonetic-based errors in grammar and contents. Even though attempts were made to correct all the mistakes, some may have been missed, and remained in the body of the document.     Gabby Lopez MD  11/30/2023

## 2023-11-30 NOTE — PROGRESS NOTES
11/29/23 3201   Treatment   Treatment Type HHN   $Treatment Type $Inhaled Therapy/Meds   Medications Albuterol/Ipratropium   Pre-Tx Pulse 99   Pre-Tx Resps 20   Breath Sounds Pre-Tx LLL Diminished   Breath Sounds Pre-Tx RLL Diminished   Post-Tx Resps 20   Delivery Source Air;Mask   Treatment Tolerance Well   Is patient on O2? Y   Breath Sounds   Upper Airway Sounds Other (comment)  (decreased)   Cough/Sputum   Cough Congested;Productive;Strong   Frequency Frequent   Sputum Amount Small   Sputum Color Cloudy     To bedside for PRN HHN. Pt with increased coughing, thin cloudy secretions,  Coughing seems incessant at times. BS's noted to be decreased bilaterally but no wheezing noted. Posteror BS's with some expiratory rales   L > R. Of note patient is 2.8L's positive with I/O's.     0209- Called back to bedside for pt requesting another HHN, with c/o continued coughing. Order is for Q4PRN not due until 36. She feels as if something is there and she cant get it out. BS's  still without wheezing, posterior BS's  Coarse bilaterally. Moving 750-1000(occasionally) on ICS  Initiated PEP therapy with flutter valve.

## 2023-11-30 NOTE — PROGRESS NOTES
Physician Progress Note      April Alfonso  [facility] #:                  [id number]  :                       1940  ADMIT DATE:       2023 6:09 AM  DISCH DATE:  RESPONDING  PROVIDER #:        Mouna Trevizo MD          QUERY TEXT:    Patient admitted with acute respiratory failure /CHF. Noted documentation of   acute on chronic CHF in hospitalist progress note on 23. In order to   support the diagnosis of acute on chronic CHF, please include additional   clinical indicators in your documentation. Or please document if the   diagnosis of acute on chronic CHF has been ruled out after further study. The medical record reflects the following:  Risk Factors: History of CHF  Clinical Indicators: BNP 1,609, / Hosp : Acute on chronic CHF  / CARD: History of chronic diastolic heart failure. Documented Acute on   chronic congestive heart failure    ECHO:Moderately reduced left ventricular systolic function with a visually   estimated EF of 40 - 45%. Left ventricle size is normal. Normal wall   thickness. Unable to assess wall motion. Indeterminate diastolic function due   to atrial fibrillation. CT CHEST: Cardiomegaly with severe right atrial enlargement and reflux of   contrast  material into dilated IVC and hepatic veins, suggestive of right heart   failure. CXR,  Treatment: ECHO,  metoprolol 25 mg twice daily  Thank You  Kirk Hernandez RN,CDI ,CRCR  Options provided:  -- Acute on chronic CHF present as evidenced by, Please document type .   -- Chronic Diastolic CHF please document type  -- Chronic Systolic and Diastolic CHF only without acute exacerbation  -- Other - I will add my own diagnosis  -- Disagree - Not applicable / Not valid  -- Disagree - Clinically unable to determine / Unknown  -- Refer to Clinical Documentation Reviewer    PROVIDER RESPONSE TEXT:    Chronic Systolic and Diastolic CHF only without acute exacerbation    Query created by: Kirk Hernandez on

## 2023-11-30 NOTE — PROGRESS NOTES
Hospitalist Progress Note    Patient: Bernadette Naik MRN: [id number]  CSN: [id number]    YOB: 1940  Age: 78 y.o. Sex: female    DOA: 11/25/2023 LOS:  LOS: 4 days                Assessment/Plan     Active Hospital Problems    Diagnosis     Sustained ventricular tachycardia (HCC) [I47.20]     Atrial fibrillation with RVR (HCC) [I48.91]     Acute hypoxemic respiratory failure (HCC) [J96.01]     Bilateral pneumonia [J18.9]     Chronic atrial fibrillation (HCC) [I48.20]     Acute on chronic congestive heart failure (HCC) [I50.9]     Dementia ([address]) [F03.90]     Pulmonary hypertension (HCC) [I27.20]     UTI (urinary tract infection) [N39.0]         Chief complaint :  SOB  78 y.o. female with h/o atrial fibrillation, hypothyroidism, anemia of chronic disease,CHF, HTN and HLD recently treated inpateint at another [facility] and discharged to SNF on 11/15/23. Presents today with acute respiratory distress with SOB. Found to be hypoxuic on arrival of EMS. Admitted for acute hypoxemic failure, bilateral pneumonia/HCAP, and possible UTI. Had episode of sustained V-tach, she was asymptomatic. On Amiodarone  Cardiology consulted. On metoprolol  Anticoagulation with eliquis      Acute hypoxemic respiratory failure  Bilateral pneumonia -treat as HCAP  Acute on chronic CHF  E- Coli ESBL UTI  Chronic dementia   Pulmonary hypertension   Acute on chronic atrial fibrillation   Pulmonary hypertension     Continue IV antibiotic meropenem  Pharmacy managing vanco dosing Urine Legionella and pneumococcal antigen-negative  Keep 02 sats >92%  Weaned off bipap,   proBNP elevated   Recent echo done, normal EF, pulm htn. Bronchodilators and budesonide nebs     Disposition : TBD    Review of systems  General: No fevers or chills. Cardiovascular: No chest pain or pressure. No palpitations. Pulmonary: No shortness of breath. Gastrointestinal: No nausea, vomiting.      Physical Exam:  General: Awake, cooperative, no acute of Plan    TIME: E/M Time spent with patient and patient care issues: [] 31-40 mins  [x] 41-49 mins  [] 50 mins or more. This time also includes physician non-face-to-face service time visit on the date of service such as  Preparing to see the patient (eg, review of tests)  Obtaining and/or reviewing separately obtained history  Performing a medically necessary appropriate examination and/or evaluation  Counseling and educating the patient/family/caregiver  Ordering medications, tests, or procedures  Referring and communicating with other health care professionals as needed  Documenting clinical information in the electronic or other health record  Independently interpreting results (not reported separately) and communicating results to the patient/family/caregiver  Care coordination and discharge planning with Case Management.

## 2023-11-30 NOTE — PROGRESS NOTES
Hospitalist Progress Note    Patient: Conor Hammer MRN: [id number]  CSN: [id number]    YOB: 1940  Age: 78 y.o. Sex: female    DOA: 11/25/2023 LOS:  LOS: 5 days                Assessment/Plan     Active Hospital Problems    Diagnosis     Sustained ventricular tachycardia (HCC) [I47.20]     Atrial fibrillation with RVR (HCC) [I48.91]     Acute hypoxemic respiratory failure (HCC) [J96.01]     Bilateral pneumonia [J18.9]     Chronic atrial fibrillation (HCC) [I48.20]     Acute on chronic congestive heart failure (HCC) [I50.9]     Dementia ([address]) [F03.90]     Pulmonary hypertension (HCC) [I27.20]     UTI (urinary tract infection) [N39.0]         Chief complaint :  SOB  78 y.o. female with h/o atrial fibrillation, hypothyroidism, anemia of chronic disease,CHF, HTN and HLD recently treated inpateint at another [facility] and discharged to SNF on 11/15/23. Presents today with acute respiratory distress with SOB. Found to be hypoxuic on arrival of EMS. Admitted for acute hypoxemic failure, bilateral pneumonia/HCAP, and possible UTI. Had episode of sustained V-tach, she was asymptomatic. On Amiodarone  Cardiology consulted. On metoprolol  Anticoagulation with eliquis      Acute hypoxemic respiratory failure  Bilateral pneumonia -treat as HCAP  Acute on chronic CHF  E- Coli ESBL UTI  Chronic dementia   Pulmonary hypertension   Acute on chronic atrial fibrillation   Pulmonary hypertension     Continue IV antibiotic meropenem   Urine Legionella and pneumococcal antigen-negative  Keep 02 sats >92%  Weaned off bipap,   proBNP elevated   Recent echo done, normal EF, pulm htn. Bronchodilators and budesonide nebs     Disposition : 1-2 days    Review of systems  General: No fevers or chills. Cardiovascular: No chest pain or pressure. No palpitations. Pulmonary: No shortness of breath. Gastrointestinal: No nausea, vomiting.      Physical Exam:  General: Awake, cooperative, no acute distress    HEENT: NC,

## 2023-12-01 ENCOUNTER — APPOINTMENT (OUTPATIENT)
Facility: HOSPITAL | Age: 83
DRG: 193 | End: 2023-12-01
Payer: MEDICARE

## 2023-12-01 LAB
ANION GAP SERPL CALC-SCNC: 5 MMOL/L (ref 3–18)
BACTERIA SPEC CULT: NORMAL
BACTERIA SPEC CULT: NORMAL
BUN SERPL-MCNC: 12 MG/DL (ref 7–18)
BUN/CREAT SERPL: 17 (ref 12–20)
CALCIUM SERPL-MCNC: 9.1 MG/DL (ref 8.5–10.1)
CHLORIDE SERPL-SCNC: 108 MMOL/L (ref 100–111)
CO2 SERPL-SCNC: 25 MMOL/L (ref 21–32)
CREAT SERPL-MCNC: 0.72 MG/DL (ref 0.6–1.3)
GLUCOSE SERPL-MCNC: 99 MG/DL (ref 74–99)
POTASSIUM SERPL-SCNC: 4.2 MMOL/L (ref 3.5–5.5)
SERVICE CMNT-IMP: NORMAL
SERVICE CMNT-IMP: NORMAL
SODIUM SERPL-SCNC: 138 MMOL/L (ref 136–145)

## 2023-12-01 PROCEDURE — 1100000003 HC PRIVATE W/ TELEMETRY

## 2023-12-01 PROCEDURE — 6370000000 HC RX 637 (ALT 250 FOR IP): Performed by: FAMILY MEDICINE

## 2023-12-01 PROCEDURE — 2580000003 HC RX 258: Performed by: INTERNAL MEDICINE

## 2023-12-01 PROCEDURE — 6370000000 HC RX 637 (ALT 250 FOR IP): Performed by: INTERNAL MEDICINE

## 2023-12-01 PROCEDURE — 74230 X-RAY XM SWLNG FUNCJ C+: CPT

## 2023-12-01 PROCEDURE — 80048 BASIC METABOLIC PNL TOTAL CA: CPT

## 2023-12-01 PROCEDURE — 36415 COLL VENOUS BLD VENIPUNCTURE: CPT

## 2023-12-01 PROCEDURE — 2700000000 HC OXYGEN THERAPY PER DAY

## 2023-12-01 PROCEDURE — 2580000003 HC RX 258: Performed by: FAMILY MEDICINE

## 2023-12-01 PROCEDURE — 6360000002 HC RX W HCPCS: Performed by: INTERNAL MEDICINE

## 2023-12-01 PROCEDURE — 94640 AIRWAY INHALATION TREATMENT: CPT

## 2023-12-01 PROCEDURE — 2500000003 HC RX 250 WO HCPCS

## 2023-12-01 RX ORDER — BENZONATATE 100 MG/1
100 CAPSULE ORAL 3 TIMES DAILY PRN
Status: CANCELLED | OUTPATIENT
Start: 2023-12-01

## 2023-12-01 RX ORDER — AMIODARONE HYDROCHLORIDE 200 MG/1
400 TABLET ORAL DAILY
Status: COMPLETED | OUTPATIENT
Start: 2023-12-02 | End: 2023-12-05

## 2023-12-01 RX ORDER — FUROSEMIDE 20 MG/1
20 TABLET ORAL DAILY
Status: DISCONTINUED | OUTPATIENT
Start: 2023-12-01 | End: 2023-12-06 | Stop reason: HOSPADM

## 2023-12-01 RX ORDER — AMIODARONE HYDROCHLORIDE 200 MG/1
200 TABLET ORAL DAILY
Status: DISCONTINUED | OUTPATIENT
Start: 2023-12-06 | End: 2023-12-06 | Stop reason: HOSPADM

## 2023-12-01 RX ADMIN — MEROPENEM 1000 MG: 1 INJECTION, POWDER, FOR SOLUTION INTRAVENOUS at 02:09

## 2023-12-01 RX ADMIN — METOPROLOL TARTRATE 100 MG: 50 TABLET ORAL at 10:23

## 2023-12-01 RX ADMIN — MEROPENEM 1000 MG: 1 INJECTION, POWDER, FOR SOLUTION INTRAVENOUS at 18:40

## 2023-12-01 RX ADMIN — IPRATROPIUM BROMIDE 0.5 MG: 0.5 SOLUTION RESPIRATORY (INHALATION) at 15:54

## 2023-12-01 RX ADMIN — APIXABAN 5 MG: 5 TABLET, FILM COATED ORAL at 10:22

## 2023-12-01 RX ADMIN — IPRATROPIUM BROMIDE 0.5 MG: 0.5 SOLUTION RESPIRATORY (INHALATION) at 07:18

## 2023-12-01 RX ADMIN — BUDESONIDE INHALATION 500 MCG: 0.5 SUSPENSION RESPIRATORY (INHALATION) at 19:52

## 2023-12-01 RX ADMIN — APIXABAN 5 MG: 5 TABLET, FILM COATED ORAL at 20:47

## 2023-12-01 RX ADMIN — ACETAMINOPHEN 650 MG: 325 TABLET ORAL at 18:43

## 2023-12-01 RX ADMIN — Medication 5 MG: at 20:46

## 2023-12-01 RX ADMIN — IPRATROPIUM BROMIDE 0.5 MG: 0.5 SOLUTION RESPIRATORY (INHALATION) at 19:52

## 2023-12-01 RX ADMIN — SODIUM CHLORIDE, PRESERVATIVE FREE 10 ML: 5 INJECTION INTRAVENOUS at 20:55

## 2023-12-01 RX ADMIN — PANTOPRAZOLE SODIUM 40 MG: 40 TABLET, DELAYED RELEASE ORAL at 06:45

## 2023-12-01 RX ADMIN — BARIUM SULFATE 1 TABLET: 700 TABLET ORAL at 14:09

## 2023-12-01 RX ADMIN — BUDESONIDE INHALATION 500 MCG: 0.5 SUSPENSION RESPIRATORY (INHALATION) at 07:18

## 2023-12-01 RX ADMIN — METOPROLOL TARTRATE 100 MG: 50 TABLET ORAL at 20:46

## 2023-12-01 RX ADMIN — AMIODARONE HYDROCHLORIDE 400 MG: 200 TABLET ORAL at 10:22

## 2023-12-01 RX ADMIN — SERTRALINE 50 MG: 50 TABLET, FILM COATED ORAL at 10:23

## 2023-12-01 RX ADMIN — MEROPENEM 1000 MG: 1 INJECTION, POWDER, FOR SOLUTION INTRAVENOUS at 10:23

## 2023-12-01 RX ADMIN — BARIUM SULFATE 20 ML: 400 PASTE ORAL at 14:09

## 2023-12-01 RX ADMIN — FUROSEMIDE 20 MG: 20 TABLET ORAL at 18:42

## 2023-12-01 RX ADMIN — BARIUM SULFATE 20 ML: 960 POWDER, FOR SUSPENSION ORAL at 14:09

## 2023-12-01 RX ADMIN — IPRATROPIUM BROMIDE 0.5 MG: 0.5 SOLUTION RESPIRATORY (INHALATION) at 12:12

## 2023-12-01 ASSESSMENT — PAIN SCALES - GENERAL: PAINLEVEL_OUTOF10: 4

## 2023-12-01 NOTE — PROGRESS NOTES
Physical Therapy      1317: Attempted to treat pt, before entering room, a nurse arrived and the son stated the pt is hyperventilating, will follow up again for PT.    1404: Pt is now off the floor, will follow up as schedule permits.

## 2023-12-01 NOTE — PROCEDURES
SPEECH PATHOLOGY MODIFIED BARIUM SWALLOW STUDY & TREATMENT  Recommendations:    Soft and bite size diet with thin liquids  OOB to chair for meals  Meds per patient preference  Feeding assistance as needed   Aspiration precautions  HOB 90 degrees during all intake and for at least 30 min after intake  Small bites/sips, Feed slowly, alternating bites/sips   Oral care three times daily   Dysphagia treatment    Goals:  Patient will:  1. Tolerate PO trials with 0 s/sx overt distress in 4/5 trials in order to determine least restrictive diet  2. Utilize compensatory swallow strategies/maneuvers (decrease bite/sip, size/rate, alt. liq/sol) with min cues in 4/5 trials in order to increase safety and tolerance of PO intake. Patient: Cherelle Ho (8 y.o. female)  Date: 12/1/2023  Primary Diagnosis: SIRS (systemic inflammatory response syndrome) ([facility]) [R65.10]  Acute respiratory failure with hypoxia ([facility]) [J96.01]  Acute hypoxemic respiratory failure ([facility]) [J96.01]  Acute on chronic congestive heart failure, unspecified heart failure type ([address]) [I50.9]       Precautions: Aspiration    ASSESSMENT :  Based on the objective data described below, the patient presents with oropharyngeal swallow essentially WFL. Patient seen today for modified barium swallow to objectively assess swallow function. Thin liquid +/- straw, puree, regular solid, and 13 mm barium pill w/ puree were tested. Visualized oropharyngeal structures were judged to be adequate for mastication and deglutition. No penetration or aspiration was observed across all trials. Swallow initiation was timely, with adequate laryngeal elevation and positive epiglottic inversion noted. Mild oral transit delay noted with large puree bolus. Minimal amounts of thin, puree, and solid residue observed in the valleculae post swallow. Mild amount of puree residue noted on lingual surface post swallow. Results shared with patient with visual feedback.  Recommendations have participated as able in goal setting and plan of care  []  Patient/family agree to work toward stated goals and plan of care  []  Patient understands intent and goals of therapy but is neutral about his/her participation  []  Patient unable to participate in goal setting/plan of care secondary to cognition, hearing/vision deficits; education ongoing with interdisciplinary staff    Thank you for this referral,  Araceli Cerda M.S., [facility]

## 2023-12-01 NOTE — PROGRESS NOTES
Neb tx started and patient started vomiting 1/2 way through tx. Therapy held at this time and will reacess at next therapy time. RN notified of pt vomiting  6475 patient says she is still getting something in her throat when asked about vomiting. Will hold Vest therapy a little longer until stomach settles a bit more. Worked with patient on flutter valve and patient had a very wet congested cough.

## 2023-12-01 NOTE — PROGRESS NOTES
Pulmonary Specialists  Pulmonary, Critical Care, and Sleep Medicine    Name: Radha Gonsalez MRN: [id number]   : 1940 Hospital: [facility]    Date: 2023  Room: [address] [place] [address] Note                                                                             Consult requesting physician: Kristie Duran MD  Reason for Consult: Acute respiratory failure    IMPRESSION:     Acute respiratory failure with hypoxia  Bilateral pneumonia-treat as HCAP  Chronic atrial fibrillation  Chronic heart failure    Patient Active Problem List   Diagnosis Code    Acute hypoxemic respiratory failure (HCC) J96.01    Bilateral pneumonia J18.9    Chronic atrial fibrillation (HCC) I48.20    Acute on chronic congestive heart failure (HCC) I50.9    Dementia ([address]) F03.90    Pulmonary hypertension (HCC) I27.20    UTI (urinary tract infection) N39.0    Atrial fibrillation with RVR ([address]) I48.91    Sustained ventricular tachycardia ([facility]) I47.20       Code status: DNR       RECOMMENDATIONS:     Respiratory: Patient appears to have combination of pneumonia and CHF, with history of pulm hypertension. Patient not needing BiPAP anymore. Bronchodilators: Continue budesonide nebs twice daily, ipratropium nebs 4 times daily. Avoid LABA due to A-fib. Steroids: Not indicated. O2: Continue 5 LPM O2 NS; titrate to keep SpO2 more than 91%. Antibiotics as below. CT chest images 23 reviewed-right-sided multilobar pneumonia with right lower lobe consolidation; small right basal effusion; no central or segmental PEs reported; cardiomegaly with pulmonary hypertension findings. CXR 2023: Mildly improved bilateral pulmonary infiltrates. Bilateral lower lobe linear atelectasis. No significant pleural effusion. Repeat CXR 2023: Worsening right basilar airspace disease. She still not using incentive spirometer and Aerobika flutter valve. Advised to use it frequently.   Patient's son reported that she has memory problem and she forgets to use it; he will remind her to use frequently while he is at bedside. Discussed with RT to remind patient to use spirometry/Aerobika flutter valve therapy frequently and start chest percussion vest therapy. Bronchodilators: Continue Pulmicort nebs twice daily, ipratropium nebs 4 times daily. Avoid LABA due to A-fib. No indication for steroids. Keep SPO2 >=92%. HOB 30 degree elevation all the time. Aggressive pulmonary toileting. Aspiration precautions. Incentive spirometry. Concern for dysphagia and likely aspiration pneumonia. Patient continued to cough while eating or drinking. Seen by speech therapist; soft and bite-size diet with thin liquids recommended. Strict aspiration precautions and dysphagia precautions. HOB more than 45 degree. Small bites and sips. MBS ordered. Speech therapist appreciated. CVS: A-fib with RVR. Echo done recently-normal EF; mild LVH; mitral valve regurgitation; mild to moderate pulm hypertension. proBNP elevated. On Eliquis, metoprolol, amiodarone p.o . Cardiologist on board. ID:   SARS-CoV-2 and influenza PCR negative  Blood cultures 11/25/2023: NGTD. Sputum culture 11/25/2023: Heavy normal anay. Urine Legionella and pneumococcal antigen-negative. UA positive; recent pansensitive E. coli UTI. Urine culture 11/25/23: E. coli-ESBL. Lactic acid-1.2. Broad-spectrum antibiotics: S/p vancomycin. Due to ESBL E. coli UTI, cefepime was changed to meropenem. Continue meropenem. History of allergy to penicillin, but patient seems to be tolerating cefepime. Follow cultures, and deescalate antibiotic when appropriate. Dr. Ana George on board. Discussed with patient, answered all questions to her satisfaction. Discussed with son at bedside at length: Overall poor prognosis due to persistence of RLL pneumonia and she has weak cough with what appears is chronic microaspiration.   Patient is still not

## 2023-12-01 NOTE — PLAN OF CARE
Problem: Discharge Planning  Goal: Discharge to home or other facility with appropriate resources  Outcome: Progressing  Flowsheets (Taken 11/30/2023 2000)  Discharge to home or other facility with appropriate resources:   Identify barriers to discharge with patient and caregiver   Identify discharge learning needs (meds, wound care, etc)     Problem: Safety - Adult  Goal: Free from fall injury  Outcome: Progressing     Problem: ABCDS Injury Assessment  Goal: Absence of physical injury  Outcome: Progressing     Problem: Chronic Conditions and Co-morbidities  Goal: Patient's chronic conditions and co-morbidity symptoms are monitored and maintained or improved  Outcome: Progressing  Flowsheets (Taken 11/30/2023 2000)  Care Plan - Patient's Chronic Conditions and Co-Morbidity Symptoms are Monitored and Maintained or Improved:   Monitor and assess patient's chronic conditions and comorbid symptoms for stability, deterioration, or improvement   Collaborate with multidisciplinary team to address chronic and comorbid conditions and prevent exacerbation or deterioration   Update acute care plan with appropriate goals if chronic or comorbid symptoms are exacerbated and prevent overall improvement and discharge     Problem: Pain  Goal: Verbalizes/displays adequate comfort level or baseline comfort level  Outcome: Progressing  Flowsheets (Taken 11/30/2023 2000)  Verbalizes/displays adequate comfort level or baseline comfort level:   Encourage patient to monitor pain and request assistance   Assess pain using appropriate pain scale   Administer analgesics based on type and severity of pain and evaluate response   Implement non-pharmacological measures as appropriate and evaluate response     Problem: Skin/Tissue Integrity  Goal: Absence of new skin breakdown  Description: 1. Monitor for areas of redness and/or skin breakdown  2. Assess vascular access sites hourly  3.   Every 4-6 hours minimum:  Change oxygen saturation

## 2023-12-02 PROCEDURE — 6370000000 HC RX 637 (ALT 250 FOR IP): Performed by: INTERNAL MEDICINE

## 2023-12-02 PROCEDURE — 6370000000 HC RX 637 (ALT 250 FOR IP): Performed by: FAMILY MEDICINE

## 2023-12-02 PROCEDURE — 6360000002 HC RX W HCPCS: Performed by: INTERNAL MEDICINE

## 2023-12-02 PROCEDURE — 2700000000 HC OXYGEN THERAPY PER DAY

## 2023-12-02 PROCEDURE — 6360000002 HC RX W HCPCS: Performed by: FAMILY MEDICINE

## 2023-12-02 PROCEDURE — 94640 AIRWAY INHALATION TREATMENT: CPT

## 2023-12-02 PROCEDURE — 2580000003 HC RX 258: Performed by: FAMILY MEDICINE

## 2023-12-02 PROCEDURE — 94667 MNPJ CHEST WALL 1ST: CPT

## 2023-12-02 PROCEDURE — 2580000003 HC RX 258: Performed by: INTERNAL MEDICINE

## 2023-12-02 PROCEDURE — 1100000003 HC PRIVATE W/ TELEMETRY

## 2023-12-02 RX ORDER — FUROSEMIDE 10 MG/ML
20 INJECTION INTRAMUSCULAR; INTRAVENOUS ONCE
Status: COMPLETED | OUTPATIENT
Start: 2023-12-02 | End: 2023-12-02

## 2023-12-02 RX ADMIN — IPRATROPIUM BROMIDE 0.5 MG: 0.5 SOLUTION RESPIRATORY (INHALATION) at 07:12

## 2023-12-02 RX ADMIN — BUDESONIDE INHALATION 500 MCG: 0.5 SUSPENSION RESPIRATORY (INHALATION) at 19:45

## 2023-12-02 RX ADMIN — IPRATROPIUM BROMIDE 0.5 MG: 0.5 SOLUTION RESPIRATORY (INHALATION) at 19:45

## 2023-12-02 RX ADMIN — BUDESONIDE INHALATION 500 MCG: 0.5 SUSPENSION RESPIRATORY (INHALATION) at 07:12

## 2023-12-02 RX ADMIN — IPRATROPIUM BROMIDE 0.5 MG: 0.5 SOLUTION RESPIRATORY (INHALATION) at 16:00

## 2023-12-02 RX ADMIN — MEROPENEM 1000 MG: 1 INJECTION, POWDER, FOR SOLUTION INTRAVENOUS at 16:51

## 2023-12-02 RX ADMIN — FUROSEMIDE 20 MG: 10 INJECTION, SOLUTION INTRAMUSCULAR; INTRAVENOUS at 09:35

## 2023-12-02 RX ADMIN — SODIUM CHLORIDE, PRESERVATIVE FREE 10 ML: 5 INJECTION INTRAVENOUS at 09:34

## 2023-12-02 RX ADMIN — Medication 5 MG: at 20:45

## 2023-12-02 RX ADMIN — ONDANSETRON 4 MG: 2 INJECTION INTRAMUSCULAR; INTRAVENOUS at 03:31

## 2023-12-02 RX ADMIN — PANTOPRAZOLE SODIUM 40 MG: 40 TABLET, DELAYED RELEASE ORAL at 06:39

## 2023-12-02 RX ADMIN — SODIUM CHLORIDE: 9 INJECTION, SOLUTION INTRAVENOUS at 02:28

## 2023-12-02 RX ADMIN — APIXABAN 5 MG: 5 TABLET, FILM COATED ORAL at 20:45

## 2023-12-02 RX ADMIN — MEROPENEM 1000 MG: 1 INJECTION, POWDER, FOR SOLUTION INTRAVENOUS at 09:24

## 2023-12-02 RX ADMIN — APIXABAN 5 MG: 5 TABLET, FILM COATED ORAL at 09:22

## 2023-12-02 RX ADMIN — MEROPENEM 1000 MG: 1 INJECTION, POWDER, FOR SOLUTION INTRAVENOUS at 02:29

## 2023-12-02 RX ADMIN — AMIODARONE HYDROCHLORIDE 400 MG: 200 TABLET ORAL at 09:23

## 2023-12-02 RX ADMIN — IPRATROPIUM BROMIDE 0.5 MG: 0.5 SOLUTION RESPIRATORY (INHALATION) at 11:25

## 2023-12-02 RX ADMIN — METOPROLOL TARTRATE 100 MG: 50 TABLET ORAL at 20:45

## 2023-12-02 RX ADMIN — METOPROLOL TARTRATE 100 MG: 50 TABLET ORAL at 09:22

## 2023-12-02 RX ADMIN — SERTRALINE 50 MG: 50 TABLET, FILM COATED ORAL at 09:23

## 2023-12-02 RX ADMIN — FUROSEMIDE 20 MG: 20 TABLET ORAL at 09:23

## 2023-12-02 NOTE — PROGRESS NOTES
Physical Therapy    1316: Pt refusing PT, will follow up as schedule permits. 1559: RT currently in room providing breathing tx, will follow up as schedule permits.

## 2023-12-02 NOTE — PROGRESS NOTES
[facility]  Pulmonary, Critical Care, and Sleep Medicine    Name: Omid Hall MRN: [id number]   : 1940 Hospital: [facility]    Date: 2023  Room: [address] [place] [address] Note                                                                             Consult requesting physician: Luisito Sequeira MD  Reason for Consult: Acute respiratory failure    IMPRESSION:     Acute respiratory failure with hypoxia  Bilateral pneumonia-treat as HCAP  Chronic atrial fibrillation  Chronic heart failure    Patient Active Problem List   Diagnosis Code    Acute hypoxemic respiratory failure (HCC) J96.01    Bilateral pneumonia J18.9    Chronic atrial fibrillation (HCC) I48.20    Acute on chronic congestive heart failure (HCC) I50.9    Dementia ([address]) F03.90    Pulmonary hypertension (HCC) I27.20    UTI (urinary tract infection) N39.0    Atrial fibrillation with RVR ([address]) I48.91    Sustained ventricular tachycardia (HCC) I47.20       Code status: DNR       RECOMMENDATIONS:     Respiratory: Patient appears to have combination of pneumonia and CHF, with history of pulm hypertension. Patient not needing BiPAP anymore. Bronchodilators: Continue budesonide nebs twice daily, ipratropium nebs 4 times daily. Avoid LABA due to A-fib. Steroids: Not indicated. O2: Continue 5 LPM O2 NS; titrate to keep SpO2 more than 91%. Antibiotics as below. CT chest images 23 reviewed-right-sided multilobar pneumonia with right lower lobe consolidation; small right basal effusion; no central or segmental PEs reported; cardiomegaly with pulmonary hypertension findings. CXR 2023: Mildly improved bilateral pulmonary infiltrates. Bilateral lower lobe linear atelectasis. No significant pleural effusion. Repeat CXR 2023: Worsening right basilar airspace disease.     Concern for chronic microaspiration and dysphagia:  MBS 2023:  FINDINGS: There is no significant penetration or ilene Aspirin 81 MG CAPS 1 tablet DAILY (route: oral) 6/1/22  Yes Ailin Campo MD   Cholecalciferol (VITAMIN D) 10 MCG (400 UNIT) CAPS Capsule 1 capsule DAILY (route: oral) 6/22/22  Yes Ailin Campo MD   cyanocobalamin ([employer] VITAMIN B12) 1000 MCG tablet Take 1 tablet by mouth daily 6/24/22  Yes Ailin Campo MD   dilTIAZem (CARDIZEM CD) 180 MG extended release capsule 1 capsule DAILY (route: oral) 6/1/22  Yes Ailin Campo MD   apixaban (ELIQUIS) 5 MG TABS tablet Take 1 tablet by mouth 2 times daily 3/23/22  Yes Ailin Campo MD   ferrous sulfate (IRON 325) 325 (65 Fe) MG tablet Take 1 tablet by mouth daily 6/23/22  Yes Ailin Campo MD   furosemide (LASIX) 20 MG tablet 1 tablet 2 TIMES DAILY (route: oral) 5/22/22  Yes Ailin Campo MD   Levothyroxine Sodium 50 MCG CAPS 1 capsule DAILY (route: oral) 6/1/22  Yes Ailin Campo MD   loperamide (IMODIUM A-D) 2 MG tablet 2 mg EVERY 12 HOURS (route: oral) 6/1/22  Yes Ailin Campo MD   sodium chloride (PF) 0.9 % SOLN 10 mL with LORazepam 2 MG/ML SOLN 1 mg AS NEEDED (route: oral) 6/1/22  Yes Ailin Campo MD   magnesium oxide (MAG-OX) 400 MG tablet Take 1 tablet by mouth daily 6/23/22  Yes Ailin Campo MD   METAMUCIL FIBER PO Per instructions DAILY (route: oral) 6/1/22  Yes Ailin Campo MD   omeprazole (PRILOSEC) 20 MG delayed release capsule Take 1 capsule by mouth every morning (before breakfast) 3/19/22  Yes Ailin Campo MD   potassium chloride (KLOR-CON M) 20 MEQ TBCR extended release tablet 1 tablet DAILY (route: oral) 6/1/22  Yes Ailin Campo MD   omeprazole (PRILOSEC) 20 MG delayed release capsule 1 capsule DAILY (route: oral) 6/1/22  Yes Ailin Campo MD   sertraline (ZOLOFT) 50 MG tablet Take 1 tablet by mouth daily 2/19/21  Yes Ailin Campo MD   acetaminophen (TYLENOL) 325 MG tablet Take 2 tablets by mouth every 4 hours as needed mistakes, some may have been missed, and remained in the body of the document.     Martha Ferguson MD  12/2/2023

## 2023-12-02 NOTE — PROGRESS NOTES
Nurse Spoke with Patient Daughter that was at the bedside to give a requested update. Daughter and Patient was educated on the importance of Patient participating with physical therapy. Daughter requested to be called if Patient refuses PT again. Patient is in her bed, bed is in it's lowest position with her personal items within reach. There are no other concerns at this time.

## 2023-12-02 NOTE — PLAN OF CARE
Problem: Discharge Planning  Goal: Discharge to home or other facility with appropriate resources  12/2/2023 1804 by Luther Dutton RN  Outcome: Progressing  12/2/2023 1043 by Luther Dutton RN  Outcome: Progressing  Flowsheets (Taken 12/2/2023 6910)  Discharge to home or other facility with appropriate resources: Identify barriers to discharge with patient and caregiver     Problem: Safety - Adult  Goal: Free from fall injury  12/2/2023 1804 by Luther Dutton RN  Outcome: Progressing  12/2/2023 1043 by Luther Dutton RN  Outcome: Progressing     Problem: ABCDS Injury Assessment  Goal: Absence of physical injury  12/2/2023 1804 by Luther Dutton RN  Outcome: Progressing  12/2/2023 1043 by Luther Dutton RN  Outcome: Progressing     Problem: Chronic Conditions and Co-morbidities  Goal: Patient's chronic conditions and co-morbidity symptoms are monitored and maintained or improved  12/2/2023 1804 by Luther Dutton RN  Outcome: Progressing  12/2/2023 1043 by Luther Dutton RN  Outcome: Progressing  Flowsheets (Taken 12/2/2023 0850)  Care Plan - Patient's Chronic Conditions and Co-Morbidity Symptoms are Monitored and Maintained or Improved: Monitor and assess patient's chronic conditions and comorbid symptoms for stability, deterioration, or improvement     Problem: Pain  Goal: Verbalizes/displays adequate comfort level or baseline comfort level  12/2/2023 1804 by Luther Dutton RN  Outcome: Progressing  12/2/2023 1043 by Luther Dutton RN  Outcome: Progressing     Problem: Skin/Tissue Integrity  Goal: Absence of new skin breakdown  Description: 1. Monitor for areas of redness and/or skin breakdown  2. Assess vascular access sites hourly  3. Every 4-6 hours minimum:  Change oxygen saturation probe site  4. Every 4-6 hours:  If on nasal continuous positive airway pressure, respiratory therapy assess nares and determine need for appliance change or resting period.   12/2/2023 1804 by Fariha Gould RN  Outcome: Progressing  12/2/2023 1043 by Fariha Gould RN  Outcome: Progressing

## 2023-12-02 NOTE — PLAN OF CARE
Problem: Discharge Planning  Goal: Discharge to home or other facility with appropriate resources  12/2/2023 1043 by Ben Pendleton RN  Outcome: Progressing  Flowsheets (Taken 12/2/2023 0850)  Discharge to home or other facility with appropriate resources: Identify barriers to discharge with patient and caregiver  12/2/2023 0000 by Justin Carr RN  Outcome: Progressing     Problem: Safety - Adult  Goal: Free from fall injury  12/2/2023 1043 by Ben Pendleton RN  Outcome: Progressing  12/2/2023 0000 by Justin Carr RN  Outcome: Progressing     Problem: ABCDS Injury Assessment  Goal: Absence of physical injury  12/2/2023 1043 by Ben Pendleton RN  Outcome: Progressing  12/2/2023 0000 by Justin Carr RN  Outcome: Progressing     Problem: Chronic Conditions and Co-morbidities  Goal: Patient's chronic conditions and co-morbidity symptoms are monitored and maintained or improved  12/2/2023 1043 by Ben Pendleton RN  Outcome: Progressing  Flowsheets (Taken 12/2/2023 0850)  Care Plan - Patient's Chronic Conditions and Co-Morbidity Symptoms are Monitored and Maintained or Improved: Monitor and assess patient's chronic conditions and comorbid symptoms for stability, deterioration, or improvement  12/2/2023 0000 by Justin Carr RN  Outcome: Progressing     Problem: Pain  Goal: Verbalizes/displays adequate comfort level or baseline comfort level  12/2/2023 1043 by Ben Pendleton RN  Outcome: Progressing  12/2/2023 0000 by Justin Carr RN  Outcome: Progressing     Problem: Skin/Tissue Integrity  Goal: Absence of new skin breakdown  Description: 1. Monitor for areas of redness and/or skin breakdown  2. Assess vascular access sites hourly  3. Every 4-6 hours minimum:  Change oxygen saturation probe site  4. Every 4-6 hours:  If on nasal continuous positive airway pressure, respiratory therapy assess nares and determine need for appliance change or resting period.   12/2/2023 1043 by Dominique Lima RN  Outcome: Progressing  12/2/2023 0000 by Andrew Aldana RN  Outcome: Progressing

## 2023-12-03 LAB
ANION GAP SERPL CALC-SCNC: 7 MMOL/L (ref 3–18)
BUN SERPL-MCNC: 13 MG/DL (ref 7–18)
BUN/CREAT SERPL: 15 (ref 12–20)
CALCIUM SERPL-MCNC: 9 MG/DL (ref 8.5–10.1)
CHLORIDE SERPL-SCNC: 107 MMOL/L (ref 100–111)
CO2 SERPL-SCNC: 27 MMOL/L (ref 21–32)
CREAT SERPL-MCNC: 0.85 MG/DL (ref 0.6–1.3)
GLUCOSE SERPL-MCNC: 103 MG/DL (ref 74–99)
MAGNESIUM SERPL-MCNC: 1.8 MG/DL (ref 1.6–2.6)
POTASSIUM SERPL-SCNC: 3.8 MMOL/L (ref 3.5–5.5)
SODIUM SERPL-SCNC: 141 MMOL/L (ref 136–145)

## 2023-12-03 PROCEDURE — 6360000002 HC RX W HCPCS: Performed by: INTERNAL MEDICINE

## 2023-12-03 PROCEDURE — 1100000003 HC PRIVATE W/ TELEMETRY

## 2023-12-03 PROCEDURE — 80048 BASIC METABOLIC PNL TOTAL CA: CPT

## 2023-12-03 PROCEDURE — 36415 COLL VENOUS BLD VENIPUNCTURE: CPT

## 2023-12-03 PROCEDURE — 6370000000 HC RX 637 (ALT 250 FOR IP): Performed by: INTERNAL MEDICINE

## 2023-12-03 PROCEDURE — 2580000003 HC RX 258: Performed by: FAMILY MEDICINE

## 2023-12-03 PROCEDURE — 83735 ASSAY OF MAGNESIUM: CPT

## 2023-12-03 PROCEDURE — 94640 AIRWAY INHALATION TREATMENT: CPT

## 2023-12-03 PROCEDURE — 2580000003 HC RX 258: Performed by: INTERNAL MEDICINE

## 2023-12-03 PROCEDURE — 6370000000 HC RX 637 (ALT 250 FOR IP): Performed by: FAMILY MEDICINE

## 2023-12-03 RX ORDER — FUROSEMIDE 10 MG/ML
20 INJECTION INTRAMUSCULAR; INTRAVENOUS ONCE
Status: COMPLETED | OUTPATIENT
Start: 2023-12-03 | End: 2023-12-03

## 2023-12-03 RX ADMIN — APIXABAN 5 MG: 5 TABLET, FILM COATED ORAL at 21:25

## 2023-12-03 RX ADMIN — SODIUM CHLORIDE, PRESERVATIVE FREE 10 ML: 5 INJECTION INTRAVENOUS at 21:31

## 2023-12-03 RX ADMIN — IPRATROPIUM BROMIDE 0.5 MG: 0.5 SOLUTION RESPIRATORY (INHALATION) at 07:10

## 2023-12-03 RX ADMIN — MEROPENEM 1000 MG: 1 INJECTION, POWDER, FOR SOLUTION INTRAVENOUS at 17:59

## 2023-12-03 RX ADMIN — MEROPENEM 1000 MG: 1 INJECTION, POWDER, FOR SOLUTION INTRAVENOUS at 01:57

## 2023-12-03 RX ADMIN — BUDESONIDE INHALATION 500 MCG: 0.5 SUSPENSION RESPIRATORY (INHALATION) at 07:10

## 2023-12-03 RX ADMIN — BUDESONIDE INHALATION 500 MCG: 0.5 SUSPENSION RESPIRATORY (INHALATION) at 19:59

## 2023-12-03 RX ADMIN — SODIUM CHLORIDE, PRESERVATIVE FREE 10 ML: 5 INJECTION INTRAVENOUS at 09:47

## 2023-12-03 RX ADMIN — METOPROLOL TARTRATE 100 MG: 50 TABLET ORAL at 21:25

## 2023-12-03 RX ADMIN — PANTOPRAZOLE SODIUM 40 MG: 40 TABLET, DELAYED RELEASE ORAL at 06:45

## 2023-12-03 RX ADMIN — FUROSEMIDE 20 MG: 10 INJECTION, SOLUTION INTRAMUSCULAR; INTRAVENOUS at 11:51

## 2023-12-03 RX ADMIN — APIXABAN 5 MG: 5 TABLET, FILM COATED ORAL at 09:46

## 2023-12-03 RX ADMIN — IPRATROPIUM BROMIDE 0.5 MG: 0.5 SOLUTION RESPIRATORY (INHALATION) at 19:59

## 2023-12-03 RX ADMIN — IPRATROPIUM BROMIDE 0.5 MG: 0.5 SOLUTION RESPIRATORY (INHALATION) at 11:07

## 2023-12-03 RX ADMIN — SERTRALINE 50 MG: 50 TABLET, FILM COATED ORAL at 09:46

## 2023-12-03 RX ADMIN — IPRATROPIUM BROMIDE 0.5 MG: 0.5 SOLUTION RESPIRATORY (INHALATION) at 15:16

## 2023-12-03 RX ADMIN — Medication 5 MG: at 21:25

## 2023-12-03 RX ADMIN — MEROPENEM 1000 MG: 1 INJECTION, POWDER, FOR SOLUTION INTRAVENOUS at 09:46

## 2023-12-03 RX ADMIN — AMIODARONE HYDROCHLORIDE 400 MG: 200 TABLET ORAL at 09:45

## 2023-12-03 RX ADMIN — FUROSEMIDE 20 MG: 20 TABLET ORAL at 09:46

## 2023-12-03 NOTE — PLAN OF CARE
Problem: Discharge Planning  Goal: Discharge to home or other facility with appropriate resources  12/3/2023 0454 by Sinai Salcido RN  Outcome: Progressing  12/2/2023 1804 by Render Castleman, RN  Outcome: Progressing     Problem: Safety - Adult  Goal: Free from fall injury  12/3/2023 0454 by Sinai Salcido RN  Outcome: Progressing  Flowsheets (Taken 12/2/2023 2000)  Free From Fall Injury:   Instruct family/caregiver on patient safety   Based on caregiver fall risk screen, instruct family/caregiver to ask for assistance with transferring infant if caregiver noted to have fall risk factors  12/2/2023 1804 by Render Castleman, RN  Outcome: Progressing     Problem: ABCDS Injury Assessment  Goal: Absence of physical injury  12/3/2023 0454 by Sinai Salcido RN  Outcome: Progressing  Flowsheets (Taken 12/2/2023 2000)  Absence of Physical Injury: Implement safety measures based on patient assessment  12/2/2023 1804 by Render Castleman, RN  Outcome: Progressing     Problem: Chronic Conditions and Co-morbidities  Goal: Patient's chronic conditions and co-morbidity symptoms are monitored and maintained or improved  12/3/2023 0454 by Sinai Salcido RN  Outcome: Progressing  12/2/2023 1804 by Render Castleman, RN  Outcome: Progressing     Problem: Pain  Goal: Verbalizes/displays adequate comfort level or baseline comfort level  12/3/2023 0454 by Sinai Salcido RN  Outcome: Progressing  12/2/2023 1804 by Render Castleman, RN  Outcome: Progressing     Problem: Skin/Tissue Integrity  Goal: Absence of new skin breakdown  Description: 1. Monitor for areas of redness and/or skin breakdown  2. Assess vascular access sites hourly  3. Every 4-6 hours minimum:  Change oxygen saturation probe site  4. Every 4-6 hours:  If on nasal continuous positive airway pressure, respiratory therapy assess nares and determine need for appliance change or resting period.   12/3/2023 0454 by Sinai Salcido RN  Outcome:

## 2023-12-03 NOTE — PLAN OF CARE
Agustina Julian's goals for this visit include:   Chief Complaint   Patient presents with     Keloid     L ear- helix- formed after piercing     She requests these members of her care team be copied on today's visit information:     PCP: No Ref-Primary, Physician    Referring Provider:  MD Smith Khan  [place]  [place] [id number]    Breastfeeding No     Do you need any medication refills at today's visit? No  Anna Owens, EMORY on 8/19/2022 at 9:48 AM       Problem: Discharge Planning  Goal: Discharge to home or other facility with appropriate resources  12/3/2023 1736 by Gerardo Sher RN  Outcome: Progressing  Flowsheets (Taken 12/3/2023 0845)  Discharge to home or other facility with appropriate resources: Identify barriers to discharge with patient and caregiver  12/3/2023 0454 by Fely Kong RN  Outcome: Progressing     Problem: Safety - Adult  Goal: Free from fall injury  12/3/2023 1736 by Gerardo Sher RN  Outcome: Progressing  12/3/2023 0454 by Fely Kong RN  Outcome: Progressing  Flowsheets (Taken 12/2/2023 2000)  Free From Fall Injury:   Instruct family/caregiver on patient safety   Based on caregiver fall risk screen, instruct family/caregiver to ask for assistance with transferring infant if caregiver noted to have fall risk factors     Problem: ABCDS Injury Assessment  Goal: Absence of physical injury  12/3/2023 1736 by Gerardo Sher RN  Outcome: Progressing  12/3/2023 0454 by Fely Kong RN  Outcome: Progressing  Flowsheets (Taken 12/2/2023 2000)  Absence of Physical Injury: Implement safety measures based on patient assessment     Problem: Chronic Conditions and Co-morbidities  Goal: Patient's chronic conditions and co-morbidity symptoms are monitored and maintained or improved  12/3/2023 1736 by Gerardo Sher RN  Outcome: Progressing  Flowsheets (Taken 12/3/2023 0845)  Care Plan - Patient's Chronic Conditions and Co-Morbidity Symptoms are Monitored and Maintained or Improved: Monitor and assess patient's chronic conditions and comorbid symptoms for stability, deterioration, or improvement  12/3/2023 0454 by Fely Kong RN  Outcome: Progressing     Problem: Pain  Goal: Verbalizes/displays adequate comfort level or baseline comfort level  12/3/2023 1736 by Gerardo Sher RN  Outcome: Progressing  12/3/2023 0454 by Fely Kong RN  Outcome: Progressing     Problem: Skin/Tissue Integrity  Goal: Absence of new skin breakdown  Description: 1. Monitor for areas of redness and/or skin breakdown  2. Assess vascular access sites hourly  3. Every 4-6 hours minimum:  Change oxygen saturation probe site  4. Every 4-6 hours:  If on nasal continuous positive airway pressure, respiratory therapy assess nares and determine need for appliance change or resting period.   12/3/2023 1736 by Rosendo Gutierrez RN  Outcome: Progressing  12/3/2023 0454 by Raffy Velasco RN  Outcome: Progressing

## 2023-12-04 ENCOUNTER — APPOINTMENT (OUTPATIENT)
Facility: HOSPITAL | Age: 83
DRG: 193 | End: 2023-12-04
Payer: MEDICARE

## 2023-12-04 PROBLEM — I42.9 CARDIOMYOPATHY (HCC): Status: ACTIVE | Noted: 2023-12-04

## 2023-12-04 LAB
ANION GAP SERPL CALC-SCNC: 8 MMOL/L (ref 3–18)
BUN SERPL-MCNC: 13 MG/DL (ref 7–18)
BUN/CREAT SERPL: 17 (ref 12–20)
CALCIUM SERPL-MCNC: 8.9 MG/DL (ref 8.5–10.1)
CHLORIDE SERPL-SCNC: 103 MMOL/L (ref 100–111)
CO2 SERPL-SCNC: 29 MMOL/L (ref 21–32)
CREAT SERPL-MCNC: 0.77 MG/DL (ref 0.6–1.3)
GLUCOSE SERPL-MCNC: 99 MG/DL (ref 74–99)
MAGNESIUM SERPL-MCNC: 1.8 MG/DL (ref 1.6–2.6)
POTASSIUM SERPL-SCNC: 3.6 MMOL/L (ref 3.5–5.5)
SODIUM SERPL-SCNC: 140 MMOL/L (ref 136–145)

## 2023-12-04 PROCEDURE — 97530 THERAPEUTIC ACTIVITIES: CPT

## 2023-12-04 PROCEDURE — 6370000000 HC RX 637 (ALT 250 FOR IP): Performed by: INTERNAL MEDICINE

## 2023-12-04 PROCEDURE — 2580000003 HC RX 258: Performed by: INTERNAL MEDICINE

## 2023-12-04 PROCEDURE — 1100000003 HC PRIVATE W/ TELEMETRY

## 2023-12-04 PROCEDURE — 6370000000 HC RX 637 (ALT 250 FOR IP): Performed by: FAMILY MEDICINE

## 2023-12-04 PROCEDURE — 94669 MECHANICAL CHEST WALL OSCILL: CPT

## 2023-12-04 PROCEDURE — 94640 AIRWAY INHALATION TREATMENT: CPT

## 2023-12-04 PROCEDURE — 36415 COLL VENOUS BLD VENIPUNCTURE: CPT

## 2023-12-04 PROCEDURE — 6360000002 HC RX W HCPCS: Performed by: INTERNAL MEDICINE

## 2023-12-04 PROCEDURE — 2580000003 HC RX 258: Performed by: FAMILY MEDICINE

## 2023-12-04 PROCEDURE — 2700000000 HC OXYGEN THERAPY PER DAY

## 2023-12-04 PROCEDURE — 71045 X-RAY EXAM CHEST 1 VIEW: CPT

## 2023-12-04 PROCEDURE — 97110 THERAPEUTIC EXERCISES: CPT

## 2023-12-04 PROCEDURE — 80048 BASIC METABOLIC PNL TOTAL CA: CPT

## 2023-12-04 PROCEDURE — 83735 ASSAY OF MAGNESIUM: CPT

## 2023-12-04 PROCEDURE — 94668 MNPJ CHEST WALL SBSQ: CPT

## 2023-12-04 RX ORDER — LANOLIN ALCOHOL/MO/W.PET/CERES
400 CREAM (GRAM) TOPICAL DAILY
Status: DISCONTINUED | OUTPATIENT
Start: 2023-12-04 | End: 2023-12-06 | Stop reason: HOSPADM

## 2023-12-04 RX ORDER — ACETYLCYSTEINE 100 MG/ML
4 SOLUTION ORAL; RESPIRATORY (INHALATION)
Status: DISCONTINUED | OUTPATIENT
Start: 2023-12-04 | End: 2023-12-06 | Stop reason: HOSPADM

## 2023-12-04 RX ORDER — POTASSIUM CHLORIDE 20 MEQ/1
20 TABLET, EXTENDED RELEASE ORAL DAILY
Status: DISCONTINUED | OUTPATIENT
Start: 2023-12-04 | End: 2023-12-06 | Stop reason: HOSPADM

## 2023-12-04 RX ORDER — ACETYLCYSTEINE 100 MG/ML
4 SOLUTION ORAL; RESPIRATORY (INHALATION)
Status: DISCONTINUED | OUTPATIENT
Start: 2023-12-04 | End: 2023-12-04

## 2023-12-04 RX ADMIN — APIXABAN 5 MG: 5 TABLET, FILM COATED ORAL at 09:05

## 2023-12-04 RX ADMIN — IPRATROPIUM BROMIDE 0.5 MG: 0.5 SOLUTION RESPIRATORY (INHALATION) at 15:26

## 2023-12-04 RX ADMIN — IPRATROPIUM BROMIDE AND ALBUTEROL SULFATE 1 DOSE: 2.5; .5 SOLUTION RESPIRATORY (INHALATION) at 21:28

## 2023-12-04 RX ADMIN — SERTRALINE 50 MG: 50 TABLET, FILM COATED ORAL at 09:04

## 2023-12-04 RX ADMIN — BUDESONIDE INHALATION 500 MCG: 0.5 SUSPENSION RESPIRATORY (INHALATION) at 07:18

## 2023-12-04 RX ADMIN — METOPROLOL TARTRATE 100 MG: 50 TABLET ORAL at 09:04

## 2023-12-04 RX ADMIN — FUROSEMIDE 20 MG: 20 TABLET ORAL at 09:04

## 2023-12-04 RX ADMIN — APIXABAN 5 MG: 5 TABLET, FILM COATED ORAL at 22:30

## 2023-12-04 RX ADMIN — MEROPENEM 1000 MG: 1 INJECTION, POWDER, FOR SOLUTION INTRAVENOUS at 17:27

## 2023-12-04 RX ADMIN — IPRATROPIUM BROMIDE 0.5 MG: 0.5 SOLUTION RESPIRATORY (INHALATION) at 11:19

## 2023-12-04 RX ADMIN — IPRATROPIUM BROMIDE 0.5 MG: 0.5 SOLUTION RESPIRATORY (INHALATION) at 07:18

## 2023-12-04 RX ADMIN — MEROPENEM 1000 MG: 1 INJECTION, POWDER, FOR SOLUTION INTRAVENOUS at 09:05

## 2023-12-04 RX ADMIN — MEROPENEM 1000 MG: 1 INJECTION, POWDER, FOR SOLUTION INTRAVENOUS at 02:18

## 2023-12-04 RX ADMIN — ACETYLCYSTEINE 400 MG: 100 SOLUTION ORAL; RESPIRATORY (INHALATION) at 21:28

## 2023-12-04 RX ADMIN — IPRATROPIUM BROMIDE 0.5 MG: 0.5 SOLUTION RESPIRATORY (INHALATION) at 19:51

## 2023-12-04 RX ADMIN — Medication 400 MG: at 15:07

## 2023-12-04 RX ADMIN — AMIODARONE HYDROCHLORIDE 400 MG: 200 TABLET ORAL at 09:04

## 2023-12-04 RX ADMIN — BUDESONIDE INHALATION 500 MCG: 0.5 SUSPENSION RESPIRATORY (INHALATION) at 19:51

## 2023-12-04 RX ADMIN — Medication 5 MG: at 22:30

## 2023-12-04 RX ADMIN — SODIUM CHLORIDE, PRESERVATIVE FREE 10 ML: 5 INJECTION INTRAVENOUS at 22:32

## 2023-12-04 RX ADMIN — PANTOPRAZOLE SODIUM 40 MG: 40 TABLET, DELAYED RELEASE ORAL at 06:21

## 2023-12-04 RX ADMIN — SODIUM CHLORIDE, PRESERVATIVE FREE 10 ML: 5 INJECTION INTRAVENOUS at 09:10

## 2023-12-04 RX ADMIN — POTASSIUM CHLORIDE 20 MEQ: 1500 TABLET, EXTENDED RELEASE ORAL at 15:07

## 2023-12-04 NOTE — PROGRESS NOTES
Pulmonary Specialists  Pulmonary, Critical Care, and Sleep Medicine    Name: Cherelle Ho MRN: [id number]   : 1940 Hospital: [facility]    Date: 2023  Room: [address] [place] [address] Note                                                                             Consult requesting physician: Kuldeep Weeks MD  Reason for Consult: Acute respiratory failure    IMPRESSION:     Acute respiratory failure with hypoxia  Bilateral pneumonia-treat as HCAP  Chronic atrial fibrillation  Chronic heart failure  Cardiomyopathy    Patient Active Problem List   Diagnosis Code    Acute hypoxemic respiratory failure (HCC) J96.01    Bilateral pneumonia J18.9    Chronic atrial fibrillation (HCC) I48.20    Acute on chronic congestive heart failure (HCC) I50.9    Dementia ([address]) F03.90    Pulmonary hypertension ([facility]) I27.20    UTI (urinary tract infection) N39.0    Atrial fibrillation with RVR ([address]) I48.91    Sustained ventricular tachycardia ([facility]) I47.20       Code status: DNR       RECOMMENDATIONS:     Respiratory: Patient appears to have combination of pneumonia and CHF, with history of pulm hypertension.   Overall respirations improved since admission; patient has come off BiPAP  Patient on nasal cannula oxygen-3 L  Patient forgetting to use incentive spirometry at bedside; baseline dementia  Continue budesonide nebs twice daily; avoid LABA due to A-fib and wide-complex arrhythmias  Ipratropium nebs 4 times daily  Patient not able to bring up phlegm; add Mucomyst nebs twice daily  Chest x-ray done today reviewed images and report-overall improved bibasal infiltrates; patchy bilateral lower lobe linear atelectasis; no worsening pleural effusions  Recommend follow-up chest x-ray PA/lateral tomorrow  Keep O2 sats greater than 91%; wean FiO2 as tolerated    CT chest images 23 reviewed-right-sided multilobar pneumonia with right lower lobe consolidation; small right basal effusion; no central entry bilaterally, decreased BS at bases, no wheezes, bibasal crackles, not tachypneic  Abd: soft, non tender, no hepatosplenomegaly, no abd distension  Neuro: non focal, awake, alert  Extrm: Mild bilateral pitting leg edema   Skin: no rash  Lymphatic: no cervical or supraclavicular adenopathy  Psych: Cooperative      Data:       Recent Results (from the past 24 hour(s))   Basic Metabolic Panel    Collection Time: 12/04/23  2:27 AM   Result Value Ref Range    Sodium 140 136 - 145 mmol/L    Potassium 3.6 3.5 - 5.5 mmol/L    Chloride 103 100 - 111 mmol/L    CO2 29 21 - 32 mmol/L    Anion Gap 8 3.0 - 18 mmol/L    Glucose 99 74 - 99 mg/dL    BUN 13 7.0 - 18 MG/DL    Creatinine 0.77 0.6 - 1.3 MG/DL    Bun/Cre Ratio 17 12 - 20      Est, Glom Filt Rate >60 >60 ml/min/1.73m2    Calcium 8.9 8.5 - 10.1 MG/DL   Magnesium    Collection Time: 12/04/23  2:27 AM   Result Value Ref Range    Magnesium 1.8 1.6 - 2.6 mg/dL           Chemistry Recent Labs     12/03/23  0308 12/04/23  0227    140   K 3.8 3.6    103   CO2 27 29   BUN 13 13   MG 1.8 1.8          Liver Enzymes Globulin   Date Value Ref Range Status   11/25/2023 3.8 2.0 - 4.0 g/dL Final     No results for input(s): \"TP\", \"ALB\", \"GLOB\" in the last 72 hours. Invalid input(s): \"AGRAT\", \"SGOT\", \"GPT\", \"AP\", \"TBIL\", \"DBIL\"       CBC w/Diff No results for input(s): \"WBC\", \"RBC\", \"HGB\", \"HCT\", \"PLT\" in the last 72 hours.     Invalid input(s): \"GRANS\", \"LYMPH\", \"EOS\"         Results       Procedure Component Value Units Date/Time    Culture, MRSA, Screening [[id number]] Collected: 11/25/23 1300    Order Status: Canceled Specimen: Nares     Sputum gram stain [[id number]] Collected: 11/25/23 1100    Order Status: Canceled Specimen: Sputum Expectorated     Culture, Respiratory [[id number]] Collected: 11/25/23 1000    Order Status: Completed Specimen: Sputum Expectorated Updated: 11/28/23 0851     Special Requests NO SPECIAL REQUESTS        Gram stain 1+ WBCS SEEN swallow examination was performed in conjunction with the speech pathology department. The patient ingested thin barium, barium pudding, barium coated crackers, and a barium tablet in the lateral position under continuous fluoroscopy. FINDINGS: There is no significant penetration or ilene aspiration. There is residual in the vallecula noted with thin barium. There is an oral delay, oral residual, and residual in the vallecula noted with barium pudding. There is residual in the vallecula noted with barium coated crackers. FLUOROSCOPY DOSE (AIR KERMA):  8.17 mGy   Modified barium swallow as above. Please see the separately dictated Speech Pathology report for detailed findings and recommendations. XR CHEST PORTABLE  Result Date: 11/29/2023  INDICATION:  Pneumonia Exam: Portable chest 1305. Comparison: 11/26/2023.11/25/2023 Findings: Cardiomediastinal silhouette is within normal limits. Pulmonary vasculature is not engorged. Worsening airspace disease at the right base. Left basilar atelectasis. No pneumothorax. 1. Worsening right basilar airspace disease    Echo (TTE) limited (PRN contrast/bubble/strain/3D)  Result Date: 11/27/2023    Left Ventricle: Moderately reduced left ventricular systolic function with a visually estimated EF of 40%. Left ventricle size is normal. Normal wall thickness. Unable to assess wall motion. Right Ventricle: Not assessed due to poor image quality. Right ventricle is mildly dilated. Aortic Valve: Not assessed due to poor image quality. Trileaflet valve. Thickened cusp. Calcified cusp. Mitral Valve: Mild regurgitation. Tricuspid Valve: Moderate regurgitation. Right Atrium: Right atrium is mildly dilated. Contrast used: Definity. CTA CHEST W WO CONTRAST  Result Date: 11/25/2023  EXAM:  CTA CHEST W WO CONTRAST INDICATION: respiratory failure, concern for PE COMPARISON: Chest radiograph 11/25/2023 CONTRAST:  100 mL of Isovue-370. ? Technique:  Following the uneventful

## 2023-12-04 NOTE — PLAN OF CARE
Problem: Discharge Planning  Goal: Discharge to home or other facility with appropriate resources  12/3/2023 2329 by Rossana Noriega RN  Outcome: Progressing  12/3/2023 1736 by Niko Oviedo RN  Outcome: Progressing  Flowsheets (Taken 12/3/2023 0845)  Discharge to home or other facility with appropriate resources: Identify barriers to discharge with patient and caregiver     Problem: Safety - Adult  Goal: Free from fall injury  12/3/2023 2329 by Rossana Noriega RN  Outcome: Progressing  12/3/2023 1736 by Niko Oviedo RN  Outcome: Progressing     Problem: Pain  Goal: Verbalizes/displays adequate comfort level or baseline comfort level  12/3/2023 2329 by Rossana Noriega RN  Outcome: Progressing  12/3/2023 1736 by Niko Oviedo RN  Outcome: Progressing     Problem: Skin/Tissue Integrity  Goal: Absence of new skin breakdown  Description: 1. Monitor for areas of redness and/or skin breakdown  2. Assess vascular access sites hourly  3. Every 4-6 hours minimum:  Change oxygen saturation probe site  4. Every 4-6 hours:  If on nasal continuous positive airway pressure, respiratory therapy assess nares and determine need for appliance change or resting period.   12/3/2023 2329 by Rossana Noriega RN  Outcome: Progressing  12/3/2023 1736 by Niko Oviedo RN  Outcome: Progressing

## 2023-12-04 NOTE — PROGRESS NOTES
Occupational Therapy Goals:  Initiated 12/4/2023 to be met within 7-10 days. Short Term Goals  Time Frame for Short Term Goals: 7 days  Short Term Goal 1: Pt will complete bed mobility with SUP to improve mobility for EOB/OOB tasks. Short Term Goal 2: Pt will complete UBD/bathing seated EOB with SUP. Short Term Goal 3: Pt will complete LBD/bathing seated EOB with mod A. Short Term Goal 4: Pt will complete grooming seated EOB with SUP. OCCUPATIONAL THERAPY TREATMENT    Patient: Radha Gonsalez (69 y.o. female)  Date: 12/4/2023  Diagnosis: SIRS (systemic inflammatory response syndrome) ([facility]) [R65.10]  Acute respiratory failure with hypoxia ([facility]) [J96.01]  Acute hypoxemic respiratory failure ([facility]) [J96.01]  Acute on chronic congestive heart failure, unspecified heart failure type ([address]) [I50.9] Acute hypoxemic respiratory failure ([address])      Precautions: Fall Risk  PLOF: See evaluation    Chart, occupational therapy assessment, plan of care, and goals were reviewed. ASSESSMENT:  Pt seen for OT follow up treatment session. Pt presented in long sitting in bed and agrees to participate with therapy. Pt performed bed mobility, supine<>sit with min A and tolerates sitting EOB to participate with BUE AROM exercises. Pt requires extended rest breaks to continue to participate with therapy session. Pt encouraged to increase OOB mobility and pt agrees to sit EOB for dinner tonight. Pt was left seated EOB in NAB. Progression toward goals:  []          Improving appropriately and progressing toward goals  [x]          Improving slowly and progressing toward goals  []          Not making progress toward goals and plan of care will be adjusted     PLAN:  Patient continues to benefit from skilled intervention to address the above impairments. Continue treatment per established plan of care.     Further Equipment Recommendations for Discharge:  ,    rolling walker   Discharge Recommendation: Discharge Recommendations: Assistance: Minimum assistance  Interventions: Tactile cues; Verbal cues  Supine to Sit: Minimum assistance  Scooting: Minimum assistance; Moderate assistance    UE Therapeutic Exercises:     Pain:  Pain level pre-treatment: 4/10   Pain level post-treatment: 4/10  Pain Intervention(s): Rest, Ice, Repositioning   Response to intervention: Nurse notified    Activity Tolerance:    Activity Tolerance: Patient limited by fatigue;Patient limited by pain  Please refer to the flowsheet for vital signs taken during this treatment. After treatment:   [x]  Patient left in no apparent distress sitting up at EOB. []  Patient left in no apparent distress in bed  [x]  Call bell left within reach  [x]  Nursing notified  [x]  Caregiver present  []  Bed alarm activated    COMMUNICATION/EDUCATION:   Patient Education  Education Given To: Patient  Education Provided: Role of Therapy;Plan of Care;Energy Conservation;Transfer Training;ADL Adaptive Strategies; Home Exercise Program      Thank you for this referral.  Faustino Bruno OT  Minutes: 23 Colchicine Counseling:  Patient counseled regarding adverse effects including but not limited to stomach upset (nausea, vomiting, stomach pain, or diarrhea).  Patient instructed to limit alcohol consumption while taking this medication.  Colchicine may reduce blood counts especially with prolonged use.  The patient understands that monitoring of kidney function and blood counts may be required, especially at baseline. The patient verbalized understanding of the proper use and possible adverse effects of colchicine.  All of the patient's questions and concerns were addressed.

## 2023-12-05 ENCOUNTER — APPOINTMENT (OUTPATIENT)
Facility: HOSPITAL | Age: 83
DRG: 193 | End: 2023-12-05
Payer: MEDICARE

## 2023-12-05 LAB
BASOPHILS # BLD: 0 K/UL (ref 0–0.1)
BASOPHILS NFR BLD: 0 % (ref 0–2)
DIFFERENTIAL METHOD BLD: ABNORMAL
EOSINOPHIL # BLD: 0.3 K/UL (ref 0–0.4)
EOSINOPHIL NFR BLD: 5 % (ref 0–5)
ERYTHROCYTE [DISTWIDTH] IN BLOOD BY AUTOMATED COUNT: 14.5 % (ref 11.6–14.5)
HCT VFR BLD AUTO: 34.2 % (ref 35–45)
HGB BLD-MCNC: 11.1 G/DL (ref 12–16)
IMM GRANULOCYTES # BLD AUTO: 0 K/UL (ref 0–0.04)
IMM GRANULOCYTES NFR BLD AUTO: 0 % (ref 0–0.5)
LYMPHOCYTES # BLD: 0.7 K/UL (ref 0.9–3.6)
LYMPHOCYTES NFR BLD: 11 % (ref 21–52)
MCH RBC QN AUTO: 31 PG (ref 24–34)
MCHC RBC AUTO-ENTMCNC: 32.5 G/DL (ref 31–37)
MCV RBC AUTO: 95.5 FL (ref 78–100)
MONOCYTES # BLD: 0.4 K/UL (ref 0.05–1.2)
MONOCYTES NFR BLD: 6 % (ref 3–10)
NEUTS SEG # BLD: 5.5 K/UL (ref 1.8–8)
NEUTS SEG NFR BLD: 78 % (ref 40–73)
NRBC # BLD: 0 K/UL (ref 0–0.01)
NRBC BLD-RTO: 0 PER 100 WBC
PLATELET # BLD AUTO: 249 K/UL (ref 135–420)
PMV BLD AUTO: 10.2 FL (ref 9.2–11.8)
RBC # BLD AUTO: 3.58 M/UL (ref 4.2–5.3)
WBC # BLD AUTO: 7.1 K/UL (ref 4.6–13.2)

## 2023-12-05 PROCEDURE — 6370000000 HC RX 637 (ALT 250 FOR IP): Performed by: INTERNAL MEDICINE

## 2023-12-05 PROCEDURE — 36415 COLL VENOUS BLD VENIPUNCTURE: CPT

## 2023-12-05 PROCEDURE — 2580000003 HC RX 258: Performed by: FAMILY MEDICINE

## 2023-12-05 PROCEDURE — 2700000000 HC OXYGEN THERAPY PER DAY

## 2023-12-05 PROCEDURE — 71046 X-RAY EXAM CHEST 2 VIEWS: CPT

## 2023-12-05 PROCEDURE — 1100000003 HC PRIVATE W/ TELEMETRY

## 2023-12-05 PROCEDURE — 94668 MNPJ CHEST WALL SBSQ: CPT

## 2023-12-05 PROCEDURE — 6360000002 HC RX W HCPCS: Performed by: INTERNAL MEDICINE

## 2023-12-05 PROCEDURE — 94640 AIRWAY INHALATION TREATMENT: CPT

## 2023-12-05 PROCEDURE — 97116 GAIT TRAINING THERAPY: CPT

## 2023-12-05 PROCEDURE — 94669 MECHANICAL CHEST WALL OSCILL: CPT

## 2023-12-05 PROCEDURE — 97110 THERAPEUTIC EXERCISES: CPT

## 2023-12-05 PROCEDURE — 6370000000 HC RX 637 (ALT 250 FOR IP): Performed by: FAMILY MEDICINE

## 2023-12-05 PROCEDURE — 2580000003 HC RX 258: Performed by: INTERNAL MEDICINE

## 2023-12-05 PROCEDURE — 85025 COMPLETE CBC W/AUTO DIFF WBC: CPT

## 2023-12-05 PROCEDURE — 97530 THERAPEUTIC ACTIVITIES: CPT

## 2023-12-05 RX ORDER — LORAZEPAM 2 MG/1
2 TABLET ORAL EVERY 6 HOURS PRN
Status: ON HOLD | COMMUNITY
End: 2023-12-06 | Stop reason: HOSPADM

## 2023-12-05 RX ORDER — LORAZEPAM 1 MG/1
2 TABLET ORAL ONCE
Status: COMPLETED | OUTPATIENT
Start: 2023-12-05 | End: 2023-12-05

## 2023-12-05 RX ADMIN — METOPROLOL TARTRATE 100 MG: 50 TABLET ORAL at 09:28

## 2023-12-05 RX ADMIN — SODIUM CHLORIDE, PRESERVATIVE FREE 10 ML: 5 INJECTION INTRAVENOUS at 21:31

## 2023-12-05 RX ADMIN — MEROPENEM 1000 MG: 1 INJECTION, POWDER, FOR SOLUTION INTRAVENOUS at 09:36

## 2023-12-05 RX ADMIN — ACETYLCYSTEINE 400 MG: 100 SOLUTION ORAL; RESPIRATORY (INHALATION) at 19:50

## 2023-12-05 RX ADMIN — SODIUM CHLORIDE, PRESERVATIVE FREE 10 ML: 5 INJECTION INTRAVENOUS at 09:29

## 2023-12-05 RX ADMIN — FUROSEMIDE 20 MG: 20 TABLET ORAL at 09:28

## 2023-12-05 RX ADMIN — MEROPENEM 1000 MG: 1 INJECTION, POWDER, FOR SOLUTION INTRAVENOUS at 17:34

## 2023-12-05 RX ADMIN — MEROPENEM 1000 MG: 1 INJECTION, POWDER, FOR SOLUTION INTRAVENOUS at 02:00

## 2023-12-05 RX ADMIN — IPRATROPIUM BROMIDE 0.5 MG: 0.5 SOLUTION RESPIRATORY (INHALATION) at 11:27

## 2023-12-05 RX ADMIN — POTASSIUM CHLORIDE 20 MEQ: 1500 TABLET, EXTENDED RELEASE ORAL at 09:29

## 2023-12-05 RX ADMIN — Medication 400 MG: at 09:28

## 2023-12-05 RX ADMIN — APIXABAN 5 MG: 5 TABLET, FILM COATED ORAL at 09:28

## 2023-12-05 RX ADMIN — METOPROLOL TARTRATE 100 MG: 50 TABLET ORAL at 21:27

## 2023-12-05 RX ADMIN — IPRATROPIUM BROMIDE 0.5 MG: 0.5 SOLUTION RESPIRATORY (INHALATION) at 07:14

## 2023-12-05 RX ADMIN — ACETYLCYSTEINE 400 MG: 100 SOLUTION ORAL; RESPIRATORY (INHALATION) at 07:14

## 2023-12-05 RX ADMIN — Medication 5 MG: at 21:27

## 2023-12-05 RX ADMIN — AMIODARONE HYDROCHLORIDE 400 MG: 200 TABLET ORAL at 09:28

## 2023-12-05 RX ADMIN — BUDESONIDE INHALATION 500 MCG: 0.5 SUSPENSION RESPIRATORY (INHALATION) at 07:14

## 2023-12-05 RX ADMIN — APIXABAN 5 MG: 5 TABLET, FILM COATED ORAL at 21:27

## 2023-12-05 RX ADMIN — IPRATROPIUM BROMIDE 0.5 MG: 0.5 SOLUTION RESPIRATORY (INHALATION) at 19:50

## 2023-12-05 RX ADMIN — LORAZEPAM 2 MG: 1 TABLET ORAL at 21:27

## 2023-12-05 RX ADMIN — IPRATROPIUM BROMIDE 0.5 MG: 0.5 SOLUTION RESPIRATORY (INHALATION) at 14:33

## 2023-12-05 RX ADMIN — SERTRALINE 50 MG: 50 TABLET, FILM COATED ORAL at 09:28

## 2023-12-05 NOTE — PROGRESS NOTES
[facility] currently has no private beds. [facility] needs updated PT and OT notes to determine decision. Inpatient rehab therapist updated.

## 2023-12-05 NOTE — PROGRESS NOTES
[facility]  (A Division of [facility])                                                           Date of Admission: 11/25/2023       Reason for Consult: Antibiotic management  Referring MD: Dr Isabelle Talamantes      Current Antimicrobials:    Prior Antimicrobials:    Meropenem 11/29 to date    Ceftriaxone-IV-> PO-11/15 #7  Cefepime 11/25 to 11/29  Vancomycin 11/25 to 11/29   Allergy to antibiotics: PCN     Assessment--ID related:     BL lung infiltrate--- due to pneumonia ( HCAP)process + pul edema likley-- resp culture with normal anay, resp viral panel negative for infection. Blood culture remain negative        --unable to produce sputum        --Procalcitonin <0.05    Acute hypoxic respiratory failure. On supplemental oxygen 4-5 L    Bladder incontinence with bacteruria-- Sensitive E.coli from 11/15 now ESBL E.coli 11/25/23    Pul Hypertension    Active Hospital Problems    Diagnosis     Sustained ventricular tachycardia (HCC) [I47.20]     Atrial fibrillation with RVR (HCC) [I48.91]     Acute hypoxemic respiratory failure (HCC) [J96.01]     Bilateral pneumonia [J18.9]     Chronic atrial fibrillation (HCC) [I48.20]     Acute on chronic congestive heart failure (HCC) [I50.9]     Dementia ([address]) [F03.90]     Pulmonary hypertension (HCC) [I27.20]     UTI (urinary tract infection) [N39.0]           Recommendation -- ID related:     DC meropenem today-- treated for 10 days for lung infection and 7 days for ESBL E.coli    No further ID input. Will sign off    Today's Visit:      Afebrile--98.1  Feels same cough/SOB-- no change per her report  Her oxygen supplement going down to 2L  She is using spirometry more now she states     Notes/Labs/Cultures and Imaging reports reviewed. FU CXR 12/5 report reviewed, imaging reviewed by me as well: The lung volumes are low. The heart is enlarged but stable. There are ongoing bilateral patchy airspace opacities.  Noted definite 1,000 mg in sodium chloride 0.9 % 100 mL IVPB (mini-bag)  1,000 mg IntraVENous Q8H Olga Valdez MD   Stopped at 12/05/23 1058    pantoprazole (PROTONIX) tablet 40 mg  40 mg Oral QAM AC Cris Powers MD   40 mg at 12/04/23 3680    metoprolol tartrate (LOPRESSOR) tablet 100 mg  100 mg Oral BID Lilo Arauz MD   100 mg at 12/05/23 9852    apixaban (ELIQUIS) tablet 5 mg  5 mg Oral BID Jamal Redmond MD   5 mg at 12/05/23 4824    dilTIAZem injection 5 mg  5 mg IntraVENous PRN Bharathi Marshall MD   5 mg at 11/26/23 1956    sodium chloride (OCEAN, BABY AYR) 0.65 % nasal spray 1 spray  1 spray Each Nostril Q2H PRN Elsy Hampton MD        sodium chloride flush 0.9 % injection 5-40 mL  5-40 mL IntraVENous 2 times per day Car Silver MD   10 mL at 12/05/23 0929    sodium chloride flush 0.9 % injection 5-40 mL  5-40 mL IntraVENous PRN Pat Powers MD        0.9 % sodium chloride infusion   IntraVENous PRN Car Silver MD 10 mL/hr at 12/02/23 0228 New Bag at 12/02/23 0228    potassium chloride 20 mEq/50 mL IVPB (Central Line)  20 mEq IntraVENous PRN Cris Powers MD        Or    potassium chloride 10 mEq/100 mL IVPB (Peripheral Line)  10 mEq IntraVENous PRN Pat Powers MD        magnesium sulfate 2000 mg in 50 mL IVPB premix  2,000 mg IntraVENous PRN Pat Powers MD        ondansetron (ZOFRAN-ODT) disintegrating tablet 4 mg  4 mg Oral Q8H PRN Pat Powers MD        Or    ondansetron (ZOFRAN) injection 4 mg  4 mg IntraVENous Q6H PRN Pat Powers MD   4 mg at 12/02/23 0331    acetaminophen (TYLENOL) tablet 650 mg  650 mg Oral Q6H PRN Pat Powers MD   650 mg at 12/01/23 1843    Or    acetaminophen (TYLENOL) suppository 650 mg  650 mg Rectal Q6H PRN Pat Powers MD        senna (SENOKOT) 8.8 MG/5ML syrup 8.8 mg  5 mL Oral BID PRN Pat Powers MD        ipratropium 0.5 mg-albuterol 2.5 mg (DUONEB) nebulizer gross focal sensory abnormalities;  Cranial nerves intact   Psychiatric:   appropriate and interactive. Labs: Results:   Chemistry Recent Labs     12/03/23  0308 12/04/23  0227    140   K 3.8 3.6    103   CO2 27 29   BUN 13 13      CBC w/Diff Recent Labs     12/05/23  0536   WBC 7.1   RBC 3.58*   HGB 11.1*   HCT 34.2*               No results found for: \"SDES\" No components found for: \"CULT\"     Results       Procedure Component Value Units Date/Time    Culture, MRSA, Screening [[id number]] Collected: 11/25/23 1300    Order Status: Canceled Specimen: Nares     Sputum gram stain [[id number]] Collected: 11/25/23 1100    Order Status: Canceled Specimen: Sputum Expectorated     Culture, Respiratory [[id number]] Collected: 11/25/23 1000    Order Status: Completed Specimen: Sputum Expectorated Updated: 11/28/23 0851     Special Requests NO SPECIAL REQUESTS        Gram stain 1+ WBCS SEEN         NO EPITHELIAL CELLS SEEN               4+ GRAM POSITIVE COCCI IN PAIRS CHAINS AND CLUSTERS            2+ Gram negative rods         OCCASIONAL BUDDING YEAST        Culture       HEAVY NORMAL RESPIRATORY RENATO          COVID-19 & Influenza Combo [[id number]] Collected: 11/25/23 3260    Order Status: Completed Specimen: Nasopharyngeal Updated: 11/25/23 1040     SARS-CoV-2, PCR Not detected        Comment: Not Detected results do not preclude SARS-CoV-2 infection and should not be used as the sole basis for patient management decisions. Results must be combined with clinical observations, patient history, and epidemiological information. Rapid Influenza A By PCR Not detected        Rapid Influenza B By PCR Not detected        Comment: Testing was performed using kelsie Kiley SARS-CoV-2 and Influenza A/B nucleic acid assay.   This test is a multiplex Real-Time Reverse Transcriptase Polymerase Chain Reaction (RT-PCR) based in vitro diagnostic test intended for the qualitative detection of nucleic acids from

## 2023-12-05 NOTE — PROGRESS NOTES
Physical Therapy Goals: To be met within 7-10 days  Short Term Goals  Short Term Goal 1: Patient will perform supine to/from sit with SBA  Short Term Goal 2: Patient will perform sit to/from stand with CGA  Short Term Goal 3: Patient will ambulate 30 ft with RW and CGA        PHYSICAL THERAPY TREATMENT    Patient: Samra Mayen (83 y.o. female)  Date: 12/5/2023  Diagnosis: SIRS (systemic inflammatory response syndrome) ([facility]) [R65.10]  Acute respiratory failure with hypoxia ([facility]) [J96.01]  Acute hypoxemic respiratory failure (HCC) [J96.01]  Acute on chronic congestive heart failure, unspecified heart failure type ([address]) [I50.9] Acute hypoxemic respiratory failure (HCC)    Precautions: Fall Risk, Isolation, Contact Precautions (ESBL in urine),  PLOF: Ambulated short distances with RW     ASSESSMENT:  Pt found supine in bed. Able to roll S<>S with CG/SBA for nuzhat care and brief change performance by NIKO Garcia and this writer. Pt L side lying to sit EOB with CGA/additional time. STS with RW/CGA/GB in place and additional time. GT limited to side steps and forward/backward steps at side of bed with seated rest between. Tolerated LE ROM and strengthening ex well as noted below. O2 sat remains in high 90's during session. Change in HR WNL. Pt able to return to L side lying and to supine w/o assist for LE mgmt. Overall, tolerated session well. Small breaks to rest during activity. Recommend SNF for f/up PT at discharge. Assessment  Activity Tolerance: Patient tolerated treatment well    Progression toward goals:   []      Improving appropriately and progressing toward goals  [x]      Improving slowly and progressing toward goals  []      Not making progress toward goals and plan of care will be adjusted     PLAN:  Patient continues to benefit from skilled intervention to address the above impairments. Continue treatment per established plan of care.     Further Equipment Recommendations for Discharge: [] [] [] []        Pain:  Pain level pre-treatment: 0/10  Pain level post-treatment: 0/10   Pain Intervention(s): Rest, Ice, Repositioning  Response to intervention: Nurse notified    Activity Tolerance:   Activity Tolerance: Patient tolerated treatment well  Please refer to the flowsheet for vital signs taken during this treatment. After treatment:   [] Patient left in no apparent distress sitting up in chair  [x] Patient left in no apparent distress in bed  [x] Call bell left within reach  [x] Nursing notified  [x] Caregiver present  [x] Bed alarm activated  [] SCDs applied      COMMUNICATION/EDUCATION:   Patient Education  Education Given To: Patient  Education Provided: Transfer Training; Fall Prevention Strategies; Home Exercise Program (GT)  Education Method: Verbal;Demonstration; Teach Back  Barriers to Learning: None  Education Outcome: Verbalized understanding;Demonstrated understanding      Bartolo Gibbs PT  Minutes: 40  turns to prior setting with prior services.

## 2023-12-05 NOTE — PROGRESS NOTES
Physical Therapy Goals:  Initiated 12/5/2023 to be met within 7-10 days. Short Term Goals  Short Term Goal 1: Patient will perform supine to/from sit with SBA  Short Term Goal 2: Patient will perform sit to/from stand with CGA  Short Term Goal 3: Patient will ambulate 30 ft with RW and CGA        PHYSICAL THERAPY TREATMENT    Patient: Del Denson (24 y.o. female)  Date: 12/4/2023  (late entry)  Diagnosis: SIRS (systemic inflammatory response syndrome) ([facility]) [R65.10]  Acute respiratory failure with hypoxia ([facility]) [J96.01]  Acute hypoxemic respiratory failure (HCC) [J96.01]  Acute on chronic congestive heart failure, unspecified heart failure type ([address]) [I50.9] Acute hypoxemic respiratory failure (HCC)    Precautions: Fall Risk  PLOF: Ambulated short distances with RW     ASSESSMENT:  Pt found sitting EOB and asking for assist to bring LE's onto bed upon PT arrival. Reports pain 6/10 pain; 5/10 post session. Pt agreeable to perform seated LE ex, then supine ex as noted below. Declined GT at this time due to fatigue. Pt left supine with all needs in reach. Nurse López notified. Pt will benefit from continued PT to improve functional mobility including gait quality, safety and independence. Progression toward goals:   []      Improving appropriately and progressing toward goals  [x]      Improving slowly and progressing toward goals  []      Not making progress toward goals and plan of care will be adjusted     PLAN:  Patient continues to benefit from skilled intervention to address the above impairments. Continue treatment per established plan of care. Further Equipment Recommendations for Discharge: TBD  Discharge Recommendation: [facility]: 13/20    This [facility] score should be considered in conjunction with interdisciplinary team recommendations to determine the most appropriate discharge setting.  Patient's social support, diagnosis, medical stability, and prior level of function should also be

## 2023-12-05 NOTE — PROGRESS NOTES
[facility]  Pulmonary, Critical Care, and Sleep Medicine    Name: Jessica Good MRN: [id number]   : 1940 Hospital: [facility]    Date: 2023  Room: [facility]/[address] [place] [address] Note                                                                             Consult requesting physician: Carla Morfin MD  Reason for Consult: Acute respiratory failure    IMPRESSION:     Acute respiratory failure with hypoxia  Bilateral pneumonia-treat as HCAP  Chronic atrial fibrillation  Chronic heart failure  Cardiomyopathy    Patient Active Problem List   Diagnosis Code    Acute hypoxemic respiratory failure (HCC) J96.01    Bilateral pneumonia J18.9    Chronic atrial fibrillation (HCC) I48.20    Acute on chronic congestive heart failure (HCC) I50.9    Dementia ([address]) F03.90    Pulmonary hypertension (HCC) I27.20    UTI (urinary tract infection) N39.0    Atrial fibrillation with RVR ([address]) I48.91    Sustained ventricular tachycardia (HCC) I47.20    Cardiomyopathy ([address]) I42.9       Code status: DNR       RECOMMENDATIONS:     Respiratory: Patient appears to have combination of pneumonia and CHF, with history of pulm hypertension.   Stable respirations; patient on nasal cannula oxygen-down to 2 L  Chest x-ray done today PA and lateral films-shows mild bibasal atelectasis; no focal infiltrates or consolidation; no pleural effusions  Stable chest x-ray findings; overall improved compared to admission findings of multilobar pneumonia; discussed with patient and family to avoid aspirations, and use incentive spirometry regularly  DC budesonide nebs  Continue ipratropium and Mucomyst nebs  Avoid LABA due to A-fib and wide-complex arrhythmias  Keep O2 sats greater than 91%; wean FiO2 as tolerated    CT chest images 23 reviewed-right-sided multilobar pneumonia with right lower lobe consolidation; small right basal effusion; no central or segmental PEs reported; cardiomegaly with pulmonary

## 2023-12-05 NOTE — PROGRESS NOTES
Pt had anxiety during the night requested ativan. Provider made aware SEE NEW ORDERS. Medication was not administered during shift due to patient being asleep and did not call. Educated the patient that medication is a one time dose and can request when needed.

## 2023-12-06 VITALS
HEART RATE: 71 BPM | TEMPERATURE: 98 F | BODY MASS INDEX: 36.78 KG/M2 | HEIGHT: 67 IN | WEIGHT: 234.35 LBS | RESPIRATION RATE: 18 BRPM | SYSTOLIC BLOOD PRESSURE: 106 MMHG | DIASTOLIC BLOOD PRESSURE: 78 MMHG | OXYGEN SATURATION: 94 %

## 2023-12-06 PROBLEM — J96.01 ACUTE HYPOXEMIC RESPIRATORY FAILURE (HCC): Status: RESOLVED | Noted: 2023-11-25 | Resolved: 2023-12-06

## 2023-12-06 PROBLEM — N39.0 UTI (URINARY TRACT INFECTION): Status: RESOLVED | Noted: 2023-11-25 | Resolved: 2023-12-06

## 2023-12-06 PROBLEM — I47.20 SUSTAINED VENTRICULAR TACHYCARDIA (HCC): Status: RESOLVED | Noted: 2023-11-28 | Resolved: 2023-12-06

## 2023-12-06 PROCEDURE — 94669 MECHANICAL CHEST WALL OSCILL: CPT

## 2023-12-06 PROCEDURE — 2580000003 HC RX 258: Performed by: INTERNAL MEDICINE

## 2023-12-06 PROCEDURE — 6370000000 HC RX 637 (ALT 250 FOR IP): Performed by: INTERNAL MEDICINE

## 2023-12-06 PROCEDURE — 6370000000 HC RX 637 (ALT 250 FOR IP): Performed by: FAMILY MEDICINE

## 2023-12-06 PROCEDURE — 2580000003 HC RX 258: Performed by: FAMILY MEDICINE

## 2023-12-06 PROCEDURE — 94640 AIRWAY INHALATION TREATMENT: CPT

## 2023-12-06 PROCEDURE — 92526 ORAL FUNCTION THERAPY: CPT

## 2023-12-06 PROCEDURE — 2700000000 HC OXYGEN THERAPY PER DAY

## 2023-12-06 PROCEDURE — 6360000002 HC RX W HCPCS: Performed by: INTERNAL MEDICINE

## 2023-12-06 RX ORDER — AMIODARONE HYDROCHLORIDE 200 MG/1
200 TABLET ORAL DAILY
Qty: 30 TABLET | Refills: 0
Start: 2023-12-07

## 2023-12-06 RX ORDER — METOPROLOL TARTRATE 100 MG/1
100 TABLET ORAL 2 TIMES DAILY
Qty: 60 TABLET | Refills: 3
Start: 2023-12-06

## 2023-12-06 RX ORDER — MECOBALAMIN 5000 MCG
5 TABLET,DISINTEGRATING ORAL NIGHTLY
Qty: 10 TABLET | Refills: 0
Start: 2023-12-06

## 2023-12-06 RX ADMIN — METOPROLOL TARTRATE 100 MG: 50 TABLET ORAL at 09:23

## 2023-12-06 RX ADMIN — IPRATROPIUM BROMIDE 0.5 MG: 0.5 SOLUTION RESPIRATORY (INHALATION) at 07:22

## 2023-12-06 RX ADMIN — IPRATROPIUM BROMIDE 0.5 MG: 0.5 SOLUTION RESPIRATORY (INHALATION) at 11:05

## 2023-12-06 RX ADMIN — SERTRALINE 50 MG: 50 TABLET, FILM COATED ORAL at 09:23

## 2023-12-06 RX ADMIN — MEROPENEM 1000 MG: 1 INJECTION, POWDER, FOR SOLUTION INTRAVENOUS at 02:08

## 2023-12-06 RX ADMIN — FUROSEMIDE 20 MG: 20 TABLET ORAL at 09:24

## 2023-12-06 RX ADMIN — ACETYLCYSTEINE 400 MG: 100 SOLUTION ORAL; RESPIRATORY (INHALATION) at 07:22

## 2023-12-06 RX ADMIN — APIXABAN 5 MG: 5 TABLET, FILM COATED ORAL at 09:23

## 2023-12-06 RX ADMIN — PANTOPRAZOLE SODIUM 40 MG: 40 TABLET, DELAYED RELEASE ORAL at 06:45

## 2023-12-06 RX ADMIN — SODIUM CHLORIDE, PRESERVATIVE FREE 10 ML: 5 INJECTION INTRAVENOUS at 09:24

## 2023-12-06 RX ADMIN — Medication 400 MG: at 09:23

## 2023-12-06 RX ADMIN — AMIODARONE HYDROCHLORIDE 200 MG: 200 TABLET ORAL at 09:23

## 2023-12-06 RX ADMIN — POTASSIUM CHLORIDE 20 MEQ: 1500 TABLET, EXTENDED RELEASE ORAL at 09:23

## 2023-12-06 NOTE — PROGRESS NOTES
Pt resting quietly in bed, took po meds one at a time with applesauce - no difficulty noted. Female external cath in place    0630 Had BM twice this am, stool was brown and soft, 2nd one was slightly mucoid in consistency. Incontinent of bowel and bladder    0720 Bedside and Verbal shift change report given to Reyna Escudero (oncoming nurse) by Alan Solano RN   (offgoing nurse). Report included the following information Nurse Handoff Report, Adult Overview, Intake/Output, MAR, Recent Results, Cardiac Rhythm AFib, Alarm Parameters, and Neuro Assessment.

## 2023-12-06 NOTE — PLAN OF CARE
Problem: Discharge Planning  Goal: Discharge to home or other facility with appropriate resources  Outcome: Progressing  Flowsheets (Taken 12/6/2023 0800)  Discharge to home or other facility with appropriate resources:   Identify barriers to discharge with patient and caregiver   Arrange for needed discharge resources and transportation as appropriate     Problem: Safety - Adult  Goal: Free from fall injury  Outcome: Progressing     Problem: ABCDS Injury Assessment  Goal: Absence of physical injury  Outcome: Progressing     Problem: Chronic Conditions and Co-morbidities  Goal: Patient's chronic conditions and co-morbidity symptoms are monitored and maintained or improved  Outcome: Progressing  Flowsheets (Taken 12/6/2023 0800)  Care Plan - Patient's Chronic Conditions and Co-Morbidity Symptoms are Monitored and Maintained or Improved:   Monitor and assess patient's chronic conditions and comorbid symptoms for stability, deterioration, or improvement   Collaborate with multidisciplinary team to address chronic and comorbid conditions and prevent exacerbation or deterioration     Problem: Pain  Goal: Verbalizes/displays adequate comfort level or baseline comfort level  Outcome: Progressing     Problem: Skin/Tissue Integrity  Goal: Absence of new skin breakdown  Description: 1. Monitor for areas of redness and/or skin breakdown  2. Assess vascular access sites hourly  3. Every 4-6 hours minimum:  Change oxygen saturation probe site  4. Every 4-6 hours:  If on nasal continuous positive airway pressure, respiratory therapy assess nares and determine need for appliance change or resting period.   Outcome: Progressing

## 2023-12-06 NOTE — DISCHARGE SUMMARY
Discharge Summary    Patient: Norm Villalobos MRN: [id number]  CSN: [id number]    YOB: 1940  Age: 78 y.o. Sex: female    DOA: 11/25/2023 LOS:  LOS: 11 days   Discharge Date:      Primary Care Provider:  Zaina Junior MD    Admission Diagnoses: SIRS (systemic inflammatory response syndrome) ([address]) [R65.10]  Acute respiratory failure with hypoxia ([address]) [J96.01]  Acute hypoxemic respiratory failure ([address]) [J96.01]  Acute on chronic congestive heart failure, unspecified heart failure type ([address]) [I50.9]    Discharge Diagnoses: Active Hospital Problems    Diagnosis     Cardiomyopathy ([address]) [I42.9]     Atrial fibrillation with RVR ([address]) [I48.91]     Bilateral pneumonia [J18.9]     Chronic atrial fibrillation ([address]) [I48.20]     Acute on chronic congestive heart failure (HCC) [I50.9]     Dementia ([address]) [F03.90]     Pulmonary hypertension (HCC) [I27.20]        Discharge Medications:        Medication List        START taking these medications      amiodarone 200 MG tablet  Commonly known as: CORDARONE  Take 1 tablet by mouth daily  Start taking on: December 7, 2023     melatonin 5 MG Tbdp disintegrating tablet  Take 1 tablet by mouth nightly            CHANGE how you take these medications      Aspirin 81 MG Caps  What changed: Another medication with the same name was removed. Continue taking this medication, and follow the directions you see here. metoprolol 100 MG tablet  Commonly known as: LOPRESSOR  Take 1 tablet by mouth 2 times daily  What changed:   medication strength  See the new instructions. omeprazole 20 MG delayed release capsule  Commonly known as: PRILOSEC  What changed: Another medication with the same name was removed. Continue taking this medication, and follow the directions you see here.             CONTINUE taking these medications      acetaminophen 325 MG tablet  Commonly known as: TYLENOL     albuterol sulfate  (90 Base) MCG/ACT inhaler  Commonly known as: PERRLA, EOMI. Anicteric sclerae. Lungs:  CTA Bilaterally. No Wheezing/Rhonchi/Rales. Heart:  Regular  rhythm,  No murmur, No Rubs, No Gallops  Abdomen: Soft, Non distended, Non tender. +Bowel sounds,   Extremities: Trace edema bilaterally. Psych:   Not anxious or agitated. Neurologic:  No acute neurological deficits. Admission HPI :   Stanton Pino is a 78 y.o. female with h/o atrial fibrillation, hypothyroidism, anemia of chronic disease,CHF, HTN and HLD recently treated inpateint at another [facility] and discharged tto SNF on 11/15/23. Presents today with acute respiratory distress with SOB. Found to be hypoxuic on arrival of EMS. Has chronic 02 need of 2L requiring 6L and then NRB and placed on BIPAP in ED. ABG showed hypoxemia. Discussed ED MD obtaining CTA chest to further evaluate for possible PE. Will get before leaving ED. Will be admitted for acute hypoxemic failure, pneumonia and possible UTI. Hospital Course :   Ms. Fabiana Jacobs was admitted to monitored floor, she was seen and followed by pulmonary, ID and cardiology. Acute hypoxemic respiratory failure -  Required to be placed on oxygen by [place], now weaned off to room air  Encourage incentive spirometry  CTA chest showed No evidence of pulmonary embolism. Enlarged pulmonary arteries, consistent with pulmonary arterial hypertension. Pulmonary interstitial edema and scattered consolidative and groundglass  opacities scattered in the right lung, likely representing alveolar edema,  though difficult to exclude infection. Small right pleural effusion. Scattered areas of atelectasis in the lungs,  worst in the right lower lobe. Cardiomegaly with severe right atrial enlargement and reflux of contrast  material into dilated IVC and hepatic veins, suggestive of right heart failure. Bilateral pneumonia -  Completed antibiotics course of 10 days  Urine Legionella and pneumococcal antigen-negative.      Acute on chronic ---------------------------------------------------------------------- Name                                 Value        Normal ---------------------------------------------------------------------- LV Dimensions 2D/MM ----------------------------------------------------------------------  IVS Diastolic Thickness (2D)                                1.1 cm         <=0.9 LVID Diastole (2D)                  3.8 cm       3.2-7.3  LVPW Diastolic Thickness (2D)                                1.2 cm         <=0.9 LVID Systole (2D)                   2.8 cm       9.3-4.7 LVID Diastolic Index (2D)        1.7 cm/m2       2.3-3.1 LV Fractional Shortening/Ejection Fraction 2D/MM ---------------------------------------------------------------------- LV EF (2D Teichholz)                  55 %                LV Fractional Shortening (2D)                                27.8 %     27.0-45.0 LV SV (Cubed)                      34.5 ml               LV SV (Teich)                      34.0 ml               LV EDV (Cubed)                     55.3 ml               LV ESV (Cubed)                     20.8 ml               LV EDV (Teich)                     62.3 ml               LV ESV (Teich)                     28.3 ml               RV Dimensions 2D/MM ----------------------------------------------------------------------  RV Basal Diastolic Dimension                           4.0 cm       2.5-4.1  RV Mid-Cavity Diastolic Dimension                           2.5 cm       1.9-3.5 TAPSV                             8.2 cm/s               RV Major Axis Length (4C)           6.8 cm Atria ---------------------------------------------------------------------- Name                                 Value        Normal ----------------------------------------------------------------------  LA Dimensions ---------------------------------------------------------------------- LA Dimension (2D)                   4.1 cm       2.7-3.8 LA Dimen Index (2D)

## 2023-12-27 NOTE — PROGRESS NOTES
Physician Progress Note      PATIENT:               Libra Velasco  CSN #:                  [id number]  :                       1940  ADMIT DATE:       2023 6:09 AM  DISCH DATE:        2023 2:20 PM  RESPONDING  PROVIDER #:        Bryan Powers MD          QUERY TEXT:    Pt admitted with pneumonia and acute resp failure. Pt noted to have WBC 13, HR   108, RR 23 at admission. If possible, please document in the progress notes and discharge summary if   you were evaluating and /or treating any of the following: The medical record reflects the following:  Risk Factors: Pneumonia  Clinical Indicators:   WBC 13, , RR 23. Pneumonia  Treatment: Vancomycin, Merrem and Maxipime    Thank You  Berna Mandujano RN, CDI, CRCR  Options provided:  -- Sepsis, present on admission with associated acute resp failure  -- Sepsis, present on admission  -- Pneumonia without Sepsis  -- Other - I will add my own diagnosis  -- Disagree - Not applicable / Not valid  -- Disagree - Clinically unable to determine / Unknown  -- Refer to Clinical Documentation Reviewer    PROVIDER RESPONSE TEXT:    This patient has sepsis which was present on admission with associated ac resp   failure. Query created by: Berna Mandujano on 2023 6:41 AM      QUERY TEXT:    Patient admitted with Respiratory failure, noted to have chronic atrial   fibrillation and is maintained on Eliquis . If possible, please document in   progress notes and discharge summary if you are evaluating and/or treating any   of the following: The medical record reflects the following:  Risk Factors: Afib, CHF, HTN  Clinical Indicators: 78 yr female with history of a fib.   Treatment: Eliquis    Thank You  Berna Mandujano RN,CDI,CRCR  Options provided:  -- Secondary hypercoagulable state in a patient with atrial fibrillation  -- Other - I will add my own diagnosis  -- Disagree - Not applicable / Not valid  -- Disagree - Clinically unable to

## 2024-06-06 ENCOUNTER — HOSPITAL ENCOUNTER (INPATIENT)
Facility: HOSPITAL | Age: 84
LOS: 5 days | Discharge: NURSING, REHAB OR LONG-TERM CARE | DRG: 177 | End: 2024-06-11
Attending: EMERGENCY MEDICINE | Admitting: INTERNAL MEDICINE
Payer: MEDICARE

## 2024-06-06 ENCOUNTER — APPOINTMENT (OUTPATIENT)
Facility: HOSPITAL | Age: 84
DRG: 177 | End: 2024-06-06
Payer: MEDICARE

## 2024-06-06 DIAGNOSIS — J18.9 PNEUMONIA OF RIGHT UPPER LOBE DUE TO INFECTIOUS ORGANISM: Primary | ICD-10-CM

## 2024-06-06 PROBLEM — R09.02 HYPOXIA: Status: ACTIVE | Noted: 2024-06-06

## 2024-06-06 LAB
ANION GAP SERPL CALC-SCNC: 6 MMOL/L (ref 3–18)
BASOPHILS # BLD: 0 K/UL (ref 0–0.1)
BASOPHILS NFR BLD: 0 % (ref 0–2)
BUN SERPL-MCNC: 18 MG/DL (ref 7–18)
BUN/CREAT SERPL: 17 (ref 12–20)
CALCIUM SERPL-MCNC: 8.7 MG/DL (ref 8.5–10.1)
CHLORIDE SERPL-SCNC: 105 MMOL/L (ref 100–111)
CO2 SERPL-SCNC: 28 MMOL/L (ref 21–32)
CREAT SERPL-MCNC: 1.03 MG/DL (ref 0.6–1.3)
DIFFERENTIAL METHOD BLD: ABNORMAL
EKG DIAGNOSIS: NORMAL
EKG Q-T INTERVAL: 332 MS
EKG QRS DURATION: 100 MS
EKG QTC CALCULATION (BAZETT): 449 MS
EKG R AXIS: -56 DEGREES
EKG T AXIS: 73 DEGREES
EKG VENTRICULAR RATE: 110 BPM
EOSINOPHIL # BLD: 0 K/UL (ref 0–0.4)
EOSINOPHIL NFR BLD: 0 % (ref 0–5)
ERYTHROCYTE [DISTWIDTH] IN BLOOD BY AUTOMATED COUNT: 15.4 % (ref 11.6–14.5)
GLUCOSE SERPL-MCNC: 127 MG/DL (ref 74–99)
HCT VFR BLD AUTO: 36.6 % (ref 35–45)
HGB BLD-MCNC: 11.1 G/DL (ref 12–16)
IMM GRANULOCYTES # BLD AUTO: 0.1 K/UL (ref 0–0.04)
IMM GRANULOCYTES NFR BLD AUTO: 0 % (ref 0–0.5)
LYMPHOCYTES # BLD: 0.4 K/UL (ref 0.9–3.6)
LYMPHOCYTES NFR BLD: 3 % (ref 21–52)
MCH RBC QN AUTO: 30.2 PG (ref 24–34)
MCHC RBC AUTO-ENTMCNC: 30.3 G/DL (ref 31–37)
MCV RBC AUTO: 99.7 FL (ref 78–100)
MONOCYTES # BLD: 1 K/UL (ref 0.05–1.2)
MONOCYTES NFR BLD: 8 % (ref 3–10)
NEUTS SEG # BLD: 11.5 K/UL (ref 1.8–8)
NEUTS SEG NFR BLD: 89 % (ref 40–73)
NRBC # BLD: 0 K/UL (ref 0–0.01)
NRBC BLD-RTO: 0 PER 100 WBC
PLATELET # BLD AUTO: 258 K/UL (ref 135–420)
PMV BLD AUTO: 10.5 FL (ref 9.2–11.8)
POTASSIUM SERPL-SCNC: 4.2 MMOL/L (ref 3.5–5.5)
RBC # BLD AUTO: 3.67 M/UL (ref 4.2–5.3)
SODIUM SERPL-SCNC: 139 MMOL/L (ref 136–145)
TROPONIN I SERPL HS-MCNC: 14 NG/L (ref 0–54)
WBC # BLD AUTO: 12.9 K/UL (ref 4.6–13.2)

## 2024-06-06 PROCEDURE — 2580000003 HC RX 258: Performed by: EMERGENCY MEDICINE

## 2024-06-06 PROCEDURE — 2500000003 HC RX 250 WO HCPCS: Performed by: EMERGENCY MEDICINE

## 2024-06-06 PROCEDURE — 96365 THER/PROPH/DIAG IV INF INIT: CPT

## 2024-06-06 PROCEDURE — 6360000002 HC RX W HCPCS: Performed by: INTERNAL MEDICINE

## 2024-06-06 PROCEDURE — 84484 ASSAY OF TROPONIN QUANT: CPT

## 2024-06-06 PROCEDURE — 1100000003 HC PRIVATE W/ TELEMETRY

## 2024-06-06 PROCEDURE — 80048 BASIC METABOLIC PNL TOTAL CA: CPT

## 2024-06-06 PROCEDURE — 93005 ELECTROCARDIOGRAM TRACING: CPT | Performed by: EMERGENCY MEDICINE

## 2024-06-06 PROCEDURE — 93010 ELECTROCARDIOGRAM REPORT: CPT | Performed by: INTERNAL MEDICINE

## 2024-06-06 PROCEDURE — 94762 N-INVAS EAR/PLS OXIMTRY CONT: CPT

## 2024-06-06 PROCEDURE — 71045 X-RAY EXAM CHEST 1 VIEW: CPT

## 2024-06-06 PROCEDURE — 99285 EMERGENCY DEPT VISIT HI MDM: CPT

## 2024-06-06 PROCEDURE — 85025 COMPLETE CBC W/AUTO DIFF WBC: CPT

## 2024-06-06 PROCEDURE — 6370000000 HC RX 637 (ALT 250 FOR IP): Performed by: INTERNAL MEDICINE

## 2024-06-06 PROCEDURE — 2580000003 HC RX 258: Performed by: INTERNAL MEDICINE

## 2024-06-06 RX ORDER — ENOXAPARIN SODIUM 100 MG/ML
40 INJECTION SUBCUTANEOUS DAILY
Status: CANCELLED | OUTPATIENT
Start: 2024-06-06

## 2024-06-06 RX ORDER — DOXYCYCLINE 100 MG/1
100 CAPSULE ORAL EVERY 12 HOURS
Status: DISCONTINUED | OUTPATIENT
Start: 2024-06-07 | End: 2024-06-11 | Stop reason: HOSPADM

## 2024-06-06 RX ORDER — IPRATROPIUM BROMIDE AND ALBUTEROL SULFATE 2.5; .5 MG/3ML; MG/3ML
1 SOLUTION RESPIRATORY (INHALATION)
Status: DISCONTINUED | OUTPATIENT
Start: 2024-06-06 | End: 2024-06-10

## 2024-06-06 RX ORDER — ACETAMINOPHEN 325 MG/1
650 TABLET ORAL EVERY 4 HOURS PRN
Status: DISCONTINUED | OUTPATIENT
Start: 2024-06-06 | End: 2024-06-11 | Stop reason: HOSPADM

## 2024-06-06 RX ORDER — ONDANSETRON 4 MG/1
4 TABLET, ORALLY DISINTEGRATING ORAL EVERY 8 HOURS PRN
Status: DISCONTINUED | OUTPATIENT
Start: 2024-06-06 | End: 2024-06-11 | Stop reason: HOSPADM

## 2024-06-06 RX ORDER — ASPIRIN 81 MG/1
81 TABLET ORAL DAILY
Status: DISCONTINUED | OUTPATIENT
Start: 2024-06-07 | End: 2024-06-11 | Stop reason: HOSPADM

## 2024-06-06 RX ORDER — PANTOPRAZOLE SODIUM 40 MG/1
40 TABLET, DELAYED RELEASE ORAL
Status: DISCONTINUED | OUTPATIENT
Start: 2024-06-07 | End: 2024-06-11 | Stop reason: HOSPADM

## 2024-06-06 RX ORDER — ONDANSETRON 2 MG/ML
4 INJECTION INTRAMUSCULAR; INTRAVENOUS EVERY 6 HOURS PRN
Status: DISCONTINUED | OUTPATIENT
Start: 2024-06-06 | End: 2024-06-11 | Stop reason: HOSPADM

## 2024-06-06 RX ORDER — AMIODARONE HYDROCHLORIDE 200 MG/1
200 TABLET ORAL DAILY
Status: DISCONTINUED | OUTPATIENT
Start: 2024-06-07 | End: 2024-06-11 | Stop reason: HOSPADM

## 2024-06-06 RX ORDER — SODIUM CHLORIDE 9 MG/ML
INJECTION, SOLUTION INTRAVENOUS PRN
Status: DISCONTINUED | OUTPATIENT
Start: 2024-06-06 | End: 2024-06-11 | Stop reason: HOSPADM

## 2024-06-06 RX ORDER — POTASSIUM CHLORIDE 20 MEQ/1
20 TABLET, EXTENDED RELEASE ORAL DAILY
Status: DISCONTINUED | OUTPATIENT
Start: 2024-06-07 | End: 2024-06-11 | Stop reason: HOSPADM

## 2024-06-06 RX ORDER — MECOBALAMIN 5000 MCG
5 TABLET,DISINTEGRATING ORAL NIGHTLY
Status: DISCONTINUED | OUTPATIENT
Start: 2024-06-06 | End: 2024-06-11 | Stop reason: HOSPADM

## 2024-06-06 RX ORDER — LEVOTHYROXINE SODIUM 0.05 MG/1
50 TABLET ORAL DAILY
Status: DISCONTINUED | OUTPATIENT
Start: 2024-06-07 | End: 2024-06-11 | Stop reason: HOSPADM

## 2024-06-06 RX ORDER — POLYETHYLENE GLYCOL 3350 17 G/17G
17 POWDER, FOR SOLUTION ORAL DAILY PRN
Status: DISCONTINUED | OUTPATIENT
Start: 2024-06-06 | End: 2024-06-11 | Stop reason: HOSPADM

## 2024-06-06 RX ORDER — SODIUM CHLORIDE 0.9 % (FLUSH) 0.9 %
5-40 SYRINGE (ML) INJECTION EVERY 12 HOURS SCHEDULED
Status: DISCONTINUED | OUTPATIENT
Start: 2024-06-06 | End: 2024-06-11 | Stop reason: HOSPADM

## 2024-06-06 RX ORDER — ACETAMINOPHEN 325 MG/1
650 TABLET ORAL EVERY 6 HOURS PRN
Status: DISCONTINUED | OUTPATIENT
Start: 2024-06-06 | End: 2024-06-11 | Stop reason: HOSPADM

## 2024-06-06 RX ORDER — POTASSIUM CHLORIDE 20 MEQ/1
40 TABLET, EXTENDED RELEASE ORAL PRN
Status: DISCONTINUED | OUTPATIENT
Start: 2024-06-06 | End: 2024-06-11 | Stop reason: HOSPADM

## 2024-06-06 RX ORDER — ACETAMINOPHEN 650 MG/1
650 SUPPOSITORY RECTAL EVERY 6 HOURS PRN
Status: DISCONTINUED | OUTPATIENT
Start: 2024-06-06 | End: 2024-06-11 | Stop reason: HOSPADM

## 2024-06-06 RX ORDER — POTASSIUM CHLORIDE 7.45 MG/ML
10 INJECTION INTRAVENOUS PRN
Status: DISCONTINUED | OUTPATIENT
Start: 2024-06-06 | End: 2024-06-11 | Stop reason: HOSPADM

## 2024-06-06 RX ORDER — DOXYCYCLINE 100 MG/1
100 CAPSULE ORAL EVERY 12 HOURS SCHEDULED
Status: DISCONTINUED | OUTPATIENT
Start: 2024-06-07 | End: 2024-06-06

## 2024-06-06 RX ORDER — LANOLIN ALCOHOL/MO/W.PET/CERES
1000 CREAM (GRAM) TOPICAL DAILY
Status: DISCONTINUED | OUTPATIENT
Start: 2024-06-07 | End: 2024-06-11 | Stop reason: HOSPADM

## 2024-06-06 RX ORDER — SODIUM CHLORIDE 0.9 % (FLUSH) 0.9 %
5-40 SYRINGE (ML) INJECTION PRN
Status: DISCONTINUED | OUTPATIENT
Start: 2024-06-06 | End: 2024-06-11 | Stop reason: HOSPADM

## 2024-06-06 RX ORDER — FERROUS SULFATE 325(65) MG
325 TABLET ORAL DAILY
Status: DISCONTINUED | OUTPATIENT
Start: 2024-06-07 | End: 2024-06-11 | Stop reason: HOSPADM

## 2024-06-06 RX ORDER — METOPROLOL TARTRATE 50 MG/1
100 TABLET, FILM COATED ORAL 2 TIMES DAILY
Status: DISCONTINUED | OUTPATIENT
Start: 2024-06-06 | End: 2024-06-11 | Stop reason: HOSPADM

## 2024-06-06 RX ORDER — MAGNESIUM SULFATE IN WATER 40 MG/ML
2000 INJECTION, SOLUTION INTRAVENOUS PRN
Status: DISCONTINUED | OUTPATIENT
Start: 2024-06-06 | End: 2024-06-11 | Stop reason: HOSPADM

## 2024-06-06 RX ORDER — LANOLIN ALCOHOL/MO/W.PET/CERES
400 CREAM (GRAM) TOPICAL DAILY
Status: DISCONTINUED | OUTPATIENT
Start: 2024-06-07 | End: 2024-06-11 | Stop reason: HOSPADM

## 2024-06-06 RX ADMIN — SODIUM CHLORIDE, PRESERVATIVE FREE 10 ML: 5 INJECTION INTRAVENOUS at 22:38

## 2024-06-06 RX ADMIN — APIXABAN 5 MG: 5 TABLET, FILM COATED ORAL at 22:27

## 2024-06-06 RX ADMIN — METOPROLOL TARTRATE 100 MG: 50 TABLET, FILM COATED ORAL at 22:31

## 2024-06-06 RX ADMIN — CEFEPIME 2000 MG: 2 INJECTION, POWDER, FOR SOLUTION INTRAVENOUS at 22:34

## 2024-06-06 RX ADMIN — DOXYCYCLINE 100 MG: 100 INJECTION, POWDER, LYOPHILIZED, FOR SOLUTION INTRAVENOUS at 19:34

## 2024-06-06 RX ADMIN — Medication 5 MG: at 22:27

## 2024-06-06 ASSESSMENT — PAIN SCALES - GENERAL: PAINLEVEL_OUTOF10: 0

## 2024-06-06 ASSESSMENT — PAIN - FUNCTIONAL ASSESSMENT: PAIN_FUNCTIONAL_ASSESSMENT: NONE - DENIES PAIN

## 2024-06-06 NOTE — ED PROVIDER NOTES
[facility] EMERGENCY DEPT  EMERGENCY DEPARTMENT HISTORY AND PHYSICAL EXAM      Date: 6/6/2024  Patient Name: Tiesha Harris  MRN: [id number]  Birthdate 1940  Date of evaluation: 6/6/2024  Provider: Scott Kamara MD   Note Started: 6:07 PM EDT 6/6/24    HISTORY OF PRESENT ILLNESS     Chief Complaint   Patient presents with    Shortness of Breath       History Provided By: Patient    HPI: Tiesha Harris is a 83 y.o. female with known past medical history significant for atrial fibrillation, hypothyroidism and a 3 L oxygen requirement presents with increased work of breathing and low oxygen according to the [facility] where EMS picked her up.  No treatments prior to arrival they did put her on a nonrebreather because her sats were in the low 80s.  They did note the patient was lying flat when they were at the hospital.  Patient denies any worsening shortness of breath and specifically denies any fever, chills, nausea, vomiting, chest pain, rash, diarrhea, headache, night sweats    PAST MEDICAL HISTORY   Past Medical History:  Past Medical History:   Diagnosis Date    Atrial fibrillation (HCC)     Hypothyroidism        Past Surgical History:  Past Surgical History:   Procedure Laterality Date    HYSTERECTOMY      TOTAL KNEE ARTHROPLASTY         Family History:  No family history on file.    Social History:  Social History     Tobacco Use    Smoking status: Never    Smokeless tobacco: Never   Substance Use Topics    Alcohol use: Not Currently    Drug use: Never       Allergies:  Allergies   Allergen Reactions    Penicillins Hives     Tolerated cefepime during admission- 11/25/23    Vancomycin Other (See Comments)     Unknown, per paperwork from living facility.        PCP: Koby Loco MD    Current Meds:   Current Facility-Administered Medications   Medication Dose Route Frequency Provider Last Rate Last Admin    doxycycline (VIBRAMYCIN) 100 mg in sodium chloride 0.9 % 100 mL IVPB (mini-bag)  100 mg  Transportation Needs (11/25/2023)    Transportation Problems ([facility] HRSN)     In the past 12 months, has lack of reliable transportation kept you from medical appointments, meetings, work or from getting things needed for daily living?: Not on file   Physical Activity: Not on file   Stress: Not on file   Social Connections: Not on file   Intimate Partner Violence: Not on file   Depression: Not on file   Housing Stability: Low Risk  (11/25/2023)    Housing Stability Vital Sign     Unable to Pay for Housing in the Last Year: No     Number of Places Lived in the Last Year: 2     Unstable Housing in the Last Year: No   Interpersonal Safety: Not At Risk (11/25/2023)    Interpersonal Safety Domain Source: IP Abuse Screening     Physical abuse: Denies     Verbal abuse: Denies     Emotional abuse: Denies     Financial abuse: Denies     Sexual abuse: Denies   Utilities: Not At Risk (11/25/2023)    [facility] Utilities     Threatened with loss of utilities: No       PHYSICAL EXAM   Physical Exam  Constitutional:       Appearance: Normal appearance.   HENT:      Head: Normocephalic and atraumatic.      Right Ear: External ear normal.      Left Ear: External ear normal.      Nose: Nose normal.      Mouth/Throat:      Mouth: Mucous membranes are moist.   Eyes:      Extraocular Movements: Extraocular movements intact.      Conjunctiva/sclera: Conjunctivae normal.      Pupils: Pupils are equal, round, and reactive to light.   Cardiovascular:      Rate and Rhythm: Normal rate. Rhythm irregular.      Pulses: Normal pulses.      Heart sounds: Normal heart sounds.   Pulmonary:      Effort: Pulmonary effort is normal.      Breath sounds: Examination of the right-upper field reveals decreased breath sounds and wheezing. Examination of the right-middle field reveals decreased breath sounds. Examination of the right-lower field reveals decreased breath sounds. Decreased breath sounds and wheezing present. No rhonchi or rales.   Chest:      Chest

## 2024-06-06 NOTE — ED NOTES
Patient arrived via [facility] medic from [facility].  Medics were called out for c/o SOB and low o2 sats.  Patient sating at 88% on room air when medics arrived on scene. Patient normally on 3L, now 95% on 3L.

## 2024-06-06 NOTE — ED NOTES
Assumed care of pt at this time. Currently resting quietly in no distress. Denies pain/discomfort. Assistance offered, call bell in reach. Lights dimmed for comfort.

## 2024-06-07 ENCOUNTER — APPOINTMENT (OUTPATIENT)
Facility: HOSPITAL | Age: 84
DRG: 177 | End: 2024-06-07
Payer: MEDICARE

## 2024-06-07 LAB
ANION GAP SERPL CALC-SCNC: 4 MMOL/L (ref 3–18)
BASOPHILS # BLD: 0.1 K/UL (ref 0–0.1)
BASOPHILS NFR BLD: 1 % (ref 0–2)
BUN SERPL-MCNC: 17 MG/DL (ref 7–18)
BUN/CREAT SERPL: 19 (ref 12–20)
CALCIUM SERPL-MCNC: 8.5 MG/DL (ref 8.5–10.1)
CHLORIDE SERPL-SCNC: 106 MMOL/L (ref 100–111)
CO2 SERPL-SCNC: 28 MMOL/L (ref 21–32)
CREAT SERPL-MCNC: 0.88 MG/DL (ref 0.6–1.3)
DIFFERENTIAL METHOD BLD: ABNORMAL
EOSINOPHIL # BLD: 0 K/UL (ref 0–0.4)
EOSINOPHIL NFR BLD: 0 % (ref 0–5)
ERYTHROCYTE [DISTWIDTH] IN BLOOD BY AUTOMATED COUNT: 15.5 % (ref 11.6–14.5)
GLUCOSE BLD STRIP.AUTO-MCNC: 196 MG/DL (ref 70–110)
GLUCOSE SERPL-MCNC: 106 MG/DL (ref 74–99)
HCT VFR BLD AUTO: 33.5 % (ref 35–45)
HGB BLD-MCNC: 10.1 G/DL (ref 12–16)
IMM GRANULOCYTES # BLD AUTO: 0.1 K/UL (ref 0–0.04)
IMM GRANULOCYTES NFR BLD AUTO: 1 % (ref 0–0.5)
LYMPHOCYTES # BLD: 0.8 K/UL (ref 0.9–3.6)
LYMPHOCYTES NFR BLD: 9 % (ref 21–52)
MCH RBC QN AUTO: 30 PG (ref 24–34)
MCHC RBC AUTO-ENTMCNC: 30.1 G/DL (ref 31–37)
MCV RBC AUTO: 99.4 FL (ref 78–100)
MONOCYTES # BLD: 0.9 K/UL (ref 0.05–1.2)
MONOCYTES NFR BLD: 10 % (ref 3–10)
NEUTS SEG # BLD: 7.3 K/UL (ref 1.8–8)
NEUTS SEG NFR BLD: 80 % (ref 40–73)
NRBC # BLD: 0 K/UL (ref 0–0.01)
NRBC BLD-RTO: 0 PER 100 WBC
PLATELET # BLD AUTO: 220 K/UL (ref 135–420)
PMV BLD AUTO: 10.5 FL (ref 9.2–11.8)
POTASSIUM SERPL-SCNC: 4 MMOL/L (ref 3.5–5.5)
RBC # BLD AUTO: 3.37 M/UL (ref 4.2–5.3)
SODIUM SERPL-SCNC: 138 MMOL/L (ref 136–145)
WBC # BLD AUTO: 9.1 K/UL (ref 4.6–13.2)

## 2024-06-07 PROCEDURE — 2500000003 HC RX 250 WO HCPCS: Performed by: HOSPITALIST

## 2024-06-07 PROCEDURE — 6360000002 HC RX W HCPCS: Performed by: INTERNAL MEDICINE

## 2024-06-07 PROCEDURE — 97162 PT EVAL MOD COMPLEX 30 MIN: CPT

## 2024-06-07 PROCEDURE — 94640 AIRWAY INHALATION TREATMENT: CPT

## 2024-06-07 PROCEDURE — 74230 X-RAY XM SWLNG FUNCJ C+: CPT

## 2024-06-07 PROCEDURE — 1100000003 HC PRIVATE W/ TELEMETRY

## 2024-06-07 PROCEDURE — 6370000000 HC RX 637 (ALT 250 FOR IP): Performed by: INTERNAL MEDICINE

## 2024-06-07 PROCEDURE — 36415 COLL VENOUS BLD VENIPUNCTURE: CPT

## 2024-06-07 PROCEDURE — 92526 ORAL FUNCTION THERAPY: CPT

## 2024-06-07 PROCEDURE — 82962 GLUCOSE BLOOD TEST: CPT

## 2024-06-07 PROCEDURE — 2700000000 HC OXYGEN THERAPY PER DAY

## 2024-06-07 PROCEDURE — 85025 COMPLETE CBC W/AUTO DIFF WBC: CPT

## 2024-06-07 PROCEDURE — 92610 EVALUATE SWALLOWING FUNCTION: CPT

## 2024-06-07 PROCEDURE — 92611 MOTION FLUOROSCOPY/SWALLOW: CPT

## 2024-06-07 PROCEDURE — 80048 BASIC METABOLIC PNL TOTAL CA: CPT

## 2024-06-07 PROCEDURE — 2580000003 HC RX 258: Performed by: INTERNAL MEDICINE

## 2024-06-07 RX ADMIN — APIXABAN 5 MG: 5 TABLET, FILM COATED ORAL at 08:51

## 2024-06-07 RX ADMIN — AMIODARONE HYDROCHLORIDE 200 MG: 200 TABLET ORAL at 08:52

## 2024-06-07 RX ADMIN — CYANOCOBALAMIN TAB 1000 MCG 1000 MCG: 1000 TAB at 08:51

## 2024-06-07 RX ADMIN — SODIUM CHLORIDE, PRESERVATIVE FREE 10 ML: 5 INJECTION INTRAVENOUS at 08:55

## 2024-06-07 RX ADMIN — ACETAMINOPHEN 650 MG: 325 TABLET ORAL at 22:39

## 2024-06-07 RX ADMIN — IPRATROPIUM BROMIDE AND ALBUTEROL SULFATE 1 DOSE: .5; 3 SOLUTION RESPIRATORY (INHALATION) at 21:14

## 2024-06-07 RX ADMIN — Medication 5 MG: at 20:39

## 2024-06-07 RX ADMIN — DOXYCYCLINE 100 MG: 100 CAPSULE ORAL at 06:19

## 2024-06-07 RX ADMIN — IPRATROPIUM BROMIDE AND ALBUTEROL SULFATE 1 DOSE: .5; 3 SOLUTION RESPIRATORY (INHALATION) at 08:19

## 2024-06-07 RX ADMIN — DOXYCYCLINE 100 MG: 100 CAPSULE ORAL at 20:39

## 2024-06-07 RX ADMIN — FERROUS SULFATE TAB 325 MG (65 MG ELEMENTAL FE) 325 MG: 325 (65 FE) TAB at 08:52

## 2024-06-07 RX ADMIN — IPRATROPIUM BROMIDE AND ALBUTEROL SULFATE 1 DOSE: .5; 3 SOLUTION RESPIRATORY (INHALATION) at 16:19

## 2024-06-07 RX ADMIN — CEFEPIME 2000 MG: 2 INJECTION, POWDER, FOR SOLUTION INTRAVENOUS at 08:55

## 2024-06-07 RX ADMIN — SODIUM CHLORIDE, PRESERVATIVE FREE 10 ML: 5 INJECTION INTRAVENOUS at 20:38

## 2024-06-07 RX ADMIN — METOPROLOL TARTRATE 100 MG: 50 TABLET, FILM COATED ORAL at 08:52

## 2024-06-07 RX ADMIN — POTASSIUM CHLORIDE 20 MEQ: 1500 TABLET, EXTENDED RELEASE ORAL at 08:51

## 2024-06-07 RX ADMIN — SERTRALINE 50 MG: 50 TABLET, FILM COATED ORAL at 08:52

## 2024-06-07 RX ADMIN — ASPIRIN 81 MG: 81 TABLET, COATED ORAL at 08:53

## 2024-06-07 RX ADMIN — PANTOPRAZOLE SODIUM 40 MG: 40 TABLET, DELAYED RELEASE ORAL at 06:19

## 2024-06-07 RX ADMIN — CEFEPIME 2000 MG: 2 INJECTION, POWDER, FOR SOLUTION INTRAVENOUS at 20:38

## 2024-06-07 RX ADMIN — BARIUM SULFATE 20 G: 960 POWDER, FOR SUSPENSION ORAL at 13:32

## 2024-06-07 RX ADMIN — METOPROLOL TARTRATE 100 MG: 50 TABLET, FILM COATED ORAL at 20:39

## 2024-06-07 RX ADMIN — APIXABAN 5 MG: 5 TABLET, FILM COATED ORAL at 20:39

## 2024-06-07 RX ADMIN — BARIUM SULFATE 15 ML: 400 PASTE ORAL at 13:32

## 2024-06-07 RX ADMIN — Medication 400 MG: at 08:52

## 2024-06-07 RX ADMIN — LEVOTHYROXINE SODIUM 50 MCG: 0.05 TABLET ORAL at 06:19

## 2024-06-07 RX ADMIN — ACETAMINOPHEN 650 MG: 325 TABLET ORAL at 00:13

## 2024-06-07 ASSESSMENT — PAIN - FUNCTIONAL ASSESSMENT
PAIN_FUNCTIONAL_ASSESSMENT: ACTIVITIES ARE NOT PREVENTED
PAIN_FUNCTIONAL_ASSESSMENT: NONE - DENIES PAIN
PAIN_FUNCTIONAL_ASSESSMENT: NONE - DENIES PAIN

## 2024-06-07 ASSESSMENT — PAIN SCALES - GENERAL
PAINLEVEL_OUTOF10: 2
PAINLEVEL_OUTOF10: 0
PAINLEVEL_OUTOF10: 6
PAINLEVEL_OUTOF10: 4
PAINLEVEL_OUTOF10: 6

## 2024-06-07 ASSESSMENT — PAIN DESCRIPTION - ORIENTATION
ORIENTATION: LEFT
ORIENTATION: LEFT

## 2024-06-07 ASSESSMENT — PAIN DESCRIPTION - DESCRIPTORS
DESCRIPTORS: ACHING
DESCRIPTORS: ACHING

## 2024-06-07 ASSESSMENT — PAIN SCALES - WONG BAKER
WONGBAKER_NUMERICALRESPONSE: NO HURT
WONGBAKER_NUMERICALRESPONSE: HURTS A LITTLE BIT
WONGBAKER_NUMERICALRESPONSE: NO HURT

## 2024-06-07 ASSESSMENT — PAIN DESCRIPTION - LOCATION
LOCATION: LEG
LOCATION: FOOT

## 2024-06-07 NOTE — PROGRESS NOTES
SW reached out to:    [facility]    Address:  [address] [place] [id number]  [facility]  Phone:  [phone]  Fax:  [phone]  -  Regarding DC plan. Family, Any JOSUE [phone], requesting SNF upon DC and will be able to transport patient to SNF in [place] upon DC if accepted. SW faxed referral via Care"2,10E+07" and called and LM with admissions team as well. SW to follow.

## 2024-06-07 NOTE — CARE COORDINATION
Patient admitted from [facility], where patient was paying out of pocket for SNF. Per [facility], patient has been paying out of pocket for skilled care with the long term plan to be moving to [place] with her daughter. SW reached out to [place] daughter, and local daughter regarding DC plan. No answer at this time, will try back at a later time. [facility] able to accept patient back when stable. PT/OT to work with patient in acute setting regarding recommendations. Referral placed for [facility] to review clinical updates.  to follow.        06/07/24 1276   Service Assessment   Patient Orientation Alert and Oriented   Cognition Alert   History Provided By Patient   Primary Caregiver Self   Support Systems Children   Patient's Healthcare Decision Maker is: Named in Scanned ACP Document   PCP Verified by CM Yes   Last Visit to PCP Within last 3 months   Prior Functional Level Assistance with the following:;Bathing;Dressing;Toileting;Feeding   Current Functional Level Assistance with the following:;Bathing;Dressing;Toileting;Feeding   Can patient return to prior living arrangement Yes   Ability to make needs known: Good   Family able to assist with home care needs: Yes   Would you like for me to discuss the discharge plan with any other family members/significant others, and if so, who? Yes   Financial Resources Other (Comment)   Community Resources None   CM/SW Referral Other (see comment)   Social/Functional History   Lives With Other (comment)   ADL Assistance Independent   Homemaking Assistance Independent   Homemaking Responsibilities No   Ambulation Assistance Independent   Transfer Assistance Independent   Active  No   Discharge Planning   Type of Residence Skilled Nursing Facility   Living Arrangements Children   Current Services Prior To Admission Skilled Nursing Facility   Potential Assistance Needed N/A   DME Ordered? Other (comment)   Potential Assistance Purchasing Medications No   Type of Home Care  Services None   Patient expects to be discharged to: Skilled nursing facility   Follow Up Appointment: Best Day/Time  Monday AM   One/Two Story Residence One story   History of falls? 0   Services At/After Discharge   Transition of Care Consult (CM Consult) SNF   Partner SNF No   Reason Why Partner SNF Not Chosen Location   Services At/After Discharge Skilled Nursing Facility (SNF)   [place] Resource Information Provided? No   Mode of Transport at Discharge BLS   Confirm Follow Up Transport Family   Condition of Participation: Discharge Planning   The Plan for Transition of Care is related to the following treatment goals: return to SNF   The Patient and/or Patient Representative was provided with a Choice of Provider? Patient   The Patient and/Or Patient Representative agree with the Discharge Plan? Yes   Freedom of Choice list was provided with basic dialogue that supports the patient's individualized plan of care/goals, treatment preferences, and shares the quality data associated with the providers?  Yes

## 2024-06-07 NOTE — PROGRESS NOTES
Physical Therapy Goals:  Initiated 6/7/2024 to be met within 7-10 days.  Short Term Goals  Short Term Goal 1: Patient will perform supine to/from sit with Minimal assist.  Short Term Goal 2: Patient will perform sit to/from stand with Minimal assist/RW in prep for ambulation activity.  Short Term Goal 3: Patient will perform ambulation 20 ft with Minimal assist/RW for increased functional mobility.      PHYSICAL THERAPY EVALUATION    Patient: Tiesha Harris (83 y.o. female)  Date: 6/7/2024  Primary Diagnosis: Hypoxia [R09.02]  Pneumonia of right upper lobe due to infectious organism [J18.9]       Precautions: Weight Bearing, Fall Risk,  , Left Lower Extremity Weight Bearing:  (Unknown WB status L LE- Aircast boot in place),  PLOF: Unclear at this time.  Pt reports not ambulating.      ASSESSMENT :  Based on the objective data described below, the patient presents with memory impairment, decreased but functional ROM/motor performance BLE's; L ankle/foot not assessed due to Aircast boot in place-?injury, ?WB status.  Pt also with limitations in functional mobility most likely, though pt declines to participate with functional mobility tasks performance at this time.  Pt pleasant and cooperative, however, declines all attempts to roll or sit EOB. Pt left long sitting with all needs in reach, O2 in place at 2 Lnc with O2 sat 97% and nurse Bharat notified of above.   Pt may benefit from IPPT to address deficits and achieve goals above.   Will most likely benefit from SNF for follow up physical therapy upon discharge.       DEFICITS/IMPAIRMENTS:   Body Structures, Functions, Activity Limitations Requiring Skilled Therapeutic Intervention: Decreased functional mobility ;Decreased ROM;Decreased strength;Decreased endurance;Decreased balance    Patient will benefit from skilled intervention to address the above impairments.  Patient's rehabilitation potential is considered to be Therapy Prognosis: Fair.  Factors which may  score and their current functional mobility deficits, it is recommended that the patient have 2-3 sessions per week of Physical Therapy at d/c to increase the patient's independence.    At this time and based on an AM-PAC score, no further PT is recommended upon discharge due to (i.e. patient at baseline functional status…etc…).  Recommend patient returns to prior setting with prior services.    This [facility] score should be considered in conjunction with interdisciplinary team recommendations to determine the most appropriate discharge setting. Patient's social support, diagnosis, medical stability, and prior level of function should also be taken into consideration.     SUBJECTIVE:   Patient stated “I haven't really walked.”    OBJECTIVE DATA SUMMARY:     Past Medical History:   Diagnosis Date    Atrial fibrillation (HCC)     Hypothyroidism      Past Surgical History:   Procedure Laterality Date    HYSTERECTOMY      TOTAL KNEE ARTHROPLASTY       Barriers to Learning/Limitations: physical and altered mental status (i.e.Sedation, Confusion)  Compensate with: visual, verbal, tactile, kinesthetic cues/model  Home Situation:  Social/Functional History  Lives With: Other (comment)  Type of Home:  (admitted from [facility], there for rehab)  Has the patient had two or more falls in the past year or any fall with injury in the past year?: Yes (appears to have fallen as pt with healing bruise L cheek)  ADL Assistance: Independent  Homemaking Assistance: Independent  Homemaking Responsibilities: No  Ambulation Assistance: Independent  Transfer Assistance: Independent  Active : No  Critical Behavior:  Orientation Level: Oriented to place;Disoriented to time;Oriented to person;Disoriented to situation  Exceptions  Appears intact  Follows all commands without difficulty  Appears intact  Impaired  Impaired                Strength:    Strength: Generally decreased, functional    Tone & Sensation:   Tone: Normal  Sensation:

## 2024-06-07 NOTE — PLAN OF CARE
trials, noted swallow delay, with premature spillage of material into the valleculae, and, in the case of regular solid, into the pyriform sinuses. Oral phase was c/b delayed and disorganized bolus prep and a-p transit with visible tongue pumping. Patient also demo's decreased base of tongue retraction and mildly reduced anterior movement of the hyoid. Epiglottic inversion appears adequate. Nursing reports some difficulty noted with taking meds, but patient declined barium pill trial at this time. She reports that she takes meds whole with puree and generally does not have difficulty.     Recommend soft and bite size diet with thin liquids, meds whole with puree, and strict aspiration precautions. Emphasized to patient importance of small bite/sip size and upright positioning. Patient verbalized understanding. ST will continue to follow.       TREATMENT :  Treatment provided post diagnostic testing including oropharyngeal anatomy/physiology, MBS results, diet recommendations and compensatory strategies/positioning.  Pt able to verbalize understanding.  Will continue to follow.       Patient will benefit from skilled intervention to address the above impairments.  Patient's rehabilitation potential/ .    Factors which may influence rehabilitation potential include:   [x]              None noted  []              Mental ability/status  []              Medical condition  []              Home/family situation and support systems  []              Safety awareness  []              Pain tolerance/management  []              Other:      PLAN :  Recommendations and Planned Interventions:  Recommendations/Treat  Requires SLP Intervention: Yes  D/C Recommendations: To be determined  Solid consistency: Soft and Bite-Sized  Liquid consistency: Thin  Liquid administration via: Cup  Medication administration: Meds in puree  Supervision: Distant  Compensatory Swallowing Strategies : Alternate solids and liquids;Small  bites/sips;Eat/Feed slowly;Upright as possible for all oral intake;Remain upright for 30-45 minutes after meals  Postural Changes and/or Swallow Maneuvers: Upright 30 min after meal;Upright 90 degrees  Therapeutic Interventions: Patient/Family education;Therapeutic PO trials with SLP  Patient Education Response: Verbalizes understanding  Frequency/Duration: Patient will be followed by speech-language pathology 1-2 times per day/3-5 days per week to address goals.     SUBJECTIVE:   Patient stated “I do fine with pills”.    OBJECTIVE:     Past Medical History:   Diagnosis Date    Atrial fibrillation (HCC)     Hypothyroidism      Past Surgical History:   Procedure Laterality Date    HYSTERECTOMY      TOTAL KNEE ARTHROPLASTY       Prior Level of Function/Home Situation:   Social/Functional History  Lives With: Other (comment)  ADL Assistance: Independent  Homemaking Assistance: Independent  Homemaking Responsibilities: No  Ambulation Assistance: Independent  Transfer Assistance: Independent  Active : No  Diet prior to admission: Regular/thin  Current Diet:  Soft and bite size/thin     Orientation:  Person and Situation    Type of Study: Modified barium swallow       Trial 1:   Consistencies Administered: Regular;Pureed;Thin;Thin - teaspoon;Thin - cup;Thin - straw   How Presented: Self-fed/presented;SLP-fed/Presented   Bolus Acceptance: No impairment   Bolus Formation/Control: Impaired   Type of Impairment: Piecemeal;Suspected premature spilling Propulsion: Delayed (# of seconds);Discoordination;Tongue pumping   Oral Residue: Less than 10% of bolus       Timing: Pooling 1-5 sec;Vallecular;Pyriform sinus   Penetration: Flash/transient;During swallow   Aspiration/Timing: No evidence of aspiration   Pharyngeal Clearance: Vallecular residue;Less than 10%   Attempted Modifications: Cup/sip;Straw;Small sips and bites   Cues: Minimal       Findings  Oral Phase Severity: Mild  Pharyngeal Phase Severity: Mild       8-point

## 2024-06-07 NOTE — H&P
HISTORY & PHYSICAL      Patient: Tiesha Harris MRN: [id number]  [facility]: [id number]    YOB: 1940  Age: 83 y.o.  Sex: female    DOA: 6/6/2024 LOS:  LOS: 0 days        DOA: 6/6/2024        Assessment/Plan     An 83 yrs presented with shortness of breath and hypoxia    -Suspected aspiration pneumonia, right upper lobe infiltrate  -Acute on chronic hypoxic respiratory failure  -Chronic Atrial fibrillation, with mild RVR  -Hypothyroidism  -Chronic CHF  -Anxiety/depression  -Generalized weakness and debility    The patient is admitted to telemetry  Continue on IV cefepime, and oral doxycycline  Follow-up pending blood cultures  DuoNebs  Continue oxygen, and wean off to baseline home oxygen requirement of 2 to 3 L as possible  Continue home Synthroid and check TSH level  Continue Eliquis and metoprolol, give her first dose now  Continue other home meds    Admission plan was discussed    The patient confirmed full CODE STATUS    Patient Active Problem List   Diagnosis    Bilateral pneumonia    Chronic atrial fibrillation (HCC)    Acute on chronic congestive heart failure (HCC)    Dementia (HCC)    Pulmonary hypertension (HCC)    Atrial fibrillation with RVR (HCC)    Cardiomyopathy (HCC)    Hypoxia       History of Presenting Illness:  An 83 years old who is admitted for hypoxia and suspected aspiration pneumonia.  The patient presented from the nursing home with shortness of breath and report of low oxygen saturation in the low 80s.  She has had mild cough with no fevers.  There was no report of vomiting, abdominal pain, or change in bowel habits.  She was brought to the ED by EMS where she required 5 to 6 L to keep oxygen saturation in the low 90s.  She is normally on 2 to 3 L of home oxygen, and has medical he significant for A-fib, CHF, and anxiety/depression and other medical problems.  Chest x-ray in ED showed right upper lobe infiltrates.  She received doxycycline in ED and admitted for further

## 2024-06-07 NOTE — PROGRESS NOTES
TRANSFER - IN REPORT:    Verbal report received from MELISSA coreas on Tiesha Harris  being received from ED for routine progression of patient care      Report consisted of patient's Situation, Background, Assessment and   Recommendations(SBAR).     Information from the following report(s) Nurse Handoff Report, ED SBAR, Recent Results, and Cardiac Rhythm Afib  was reviewed with the receiving nurse.    Opportunity for questions and clarification was provided.      Assessment completed upon patient's arrival to unit and care assumed.

## 2024-06-07 NOTE — PROGRESS NOTES
Hospitalist Progress Note    Patient: Tiesha Harris MRN: [id number]  CSN: [id number]    YOB: 1940  Age: 83 y.o.  Sex: female    DOA: 6/6/2024 LOS:  LOS: 1 day          Chief Complaint:    SOB      Assessment/Plan     An 83 yr old WF presented with shortness of breath and hypoxia  From [facility]     -Suspected aspiration pneumonia, right upper lobe infiltrate  -Acute on chronic hypoxic respiratory failure  -Atrial fibrillation, with mild RVR  -Hypothyroidism  -Chronic CHF  -Anxiety/depression  -Generalized weakness and debility  -systolic CHF chronic     Continue on IV cefepime, and oral doxycycline  Follow-up pending blood cultures  DuoNebs  Continue oxygen  Continue home Synthroid and check TSH level  Continue Eliquis and metoprolol  Low threshold to give lasix as needed for volume issue, but BP is low normal as of now  Continue other home meds    Speech therpay consult, ? Dysphagia    Labs reviewed    DNR    Cons PT     DVT proph covered by [employer]    Disposition :2-3 days, SNF/LTC  Active Hospital Problems    Diagnosis     Hypoxia [R09.02]     Cardiomyopathy (HCC) [I42.9]     Chronic atrial fibrillation (HCC) [I48.20]     Dementia (HCC) [F03.90]     Pulmonary hypertension (HCC) [I27.20]     Pneumonia [J18.9]        Subjective:    She is pleasantly confused thus ROS not insightful  No nursing issues reported    Review of systems:    As above      Vital signs/Intake and Output:  Visit Vitals  /81   Pulse 78   Temp 97.9 °F (36.6 °C) (Oral)   Resp 21   Ht 1.702 m (5' 7\")   Wt 85.3 kg (188 lb)   SpO2 99%   BMI 29.44 kg/m²     Current Shift:  No intake/output data recorded.  Last three shifts:  No intake/output data recorded.    Exam:    General: elderly confused WF,alert, NAD  CVS:Regular rate and rhythm, no M/R/G, S1/S2 heard, no thrill  Lungs:Clear to auscultation bilaterally, no wheezes, rhonchi, or rales  Abdomen: Soft, Nontender, No distention, Normal Bowel sounds  Extremities: No C/C/E,  pulses palpable 2+  Neuro:grossly normal   Psych:appropriate    Labs: Results:       Chemistry Recent Labs     06/06/24  1845 06/07/24  0515   GLUCOSE 127* 106*    138   K 4.2 4.0    106   CO2 28 28   BUN 18 17   CREATININE 1.03 0.88   CALCIUM 8.7 8.5   LABGLOM 53* 64      CBC w/Diff Recent Labs     06/06/24  1845 06/07/24  0515   WBC 12.9 9.1   RBC 3.67* 3.37*   HGB 11.1* 10.1*   HCT 36.6 33.5*    220   LYMPHOPCT 3* 9*      Cardiac Enzymes No results for input(s): \"CKTOTAL\", \"CKMB\", \"CKMBINDEX\", \"TROPONINI\" in the last 72 hours.    Invalid input(s): \"RANDALL\"   Coagulation No results for input(s): \"PROTIME\", \"INR\", \"APTT\" in the last 72 hours.    Lipid Panel No results found for: \"CHOL\", \"TRIG\", \"HDL\", \"VLDL\", \"CHOLHDLRATIO\"   BNP No results for input(s): \"BNP\" in the last 72 hours.   Liver Enzymes No results for input(s): \"ALT\", \"AST\", \"GGT\", \"ALKPHOS\", \"BILITOT\", \"BILIDIR\" in the last 72 hours.   Thyroid Studies Lab Results   Component Value Date/Time    TSH 2.19 03/08/2022 05:04 PM          Procedures/imaging: see electronic medical records for all procedures/Xrays and details which were not copied into this note but were reviewed prior to creation of Plan      Rhonda Landry MD

## 2024-06-07 NOTE — PLAN OF CARE
Problem: SLP Adult - Impaired Swallowing  Goal: By Discharge: Advance to least restrictive diet without signs or symptoms of aspiration for planned discharge setting.  See evaluation for individualized goals.  Outcome: Progressing  Note: Patient will:  1. Tolerate PO trials with 0 s/s overt distress in 4/5 trials  2. Utilize compensatory swallow strategies/maneuvers (decrease bite/sip, size/rate, alt. liq/sol) with min cues in 4/5 trials  3. Perform oral-motor/laryngeal exercises to increase oropharyngeal swallow function with min cues  4. Complete an objective swallow study (i.e., MBSS) to assess swallow integrity, r/o aspiration, and determine of safest LRD, min A as indicated/ordered by MD     Rec:     Soft and bite size diet with thin liquids  Aspiration precautions  HOB >45 during po intake, remain >30 for 30-45 minutes after po   Small bites/sips; alternate liquid/solid with slow feeding rate   Oral care TID  Meds whole with puree  MBS to further assess oropharyngeal swallow fxn     SPEECH LANGUAGE PATHOLOGY BEDSIDE SWALLOW EVALUATION/TREATMENT    Patient: Tiesha Harris (83 y.o. female)  Date: 6/7/2024  Primary Diagnosis: Hypoxia [R09.02]  Pneumonia of right upper lobe due to infectious organism [J18.9]       Precautions: Aspiration  PLOF: As per H&P  ASSESSMENT:  Based on the objective data described below, the patient presents with mild oropharyngeal dysphagia. Patient seen today for clinical bedside swallow evaluation d/t admission with possible aspiration pneumonia. She is alert and oriented to self and relative situation/place. MaxA required to reposition patient upright ~80 degrees @HOB. She denies new or worsening difficulty swallowing. MBS performed at this facility in December 2023 revealed swallow fx essentially WFL. However, she is currently admitted with upper right lobe infiltrates.     Oral [facility], Inch exam revealed structures essentially functional for mastication and deglutition. She is missing some  upper and lower teeth. Volitional swallow noted to be possibly delayed. Lunch tray with regular solids arrived. SLP cut solids into bite size pieces. Patient was observed to tolerate PO trials of thin liquid +/- straw, puree, and soft and bite size solids without overt s/sx of aspiration. Bolus prep/transit noted to be somewhat slow. Positive oral clearance was observed across all trials, with clear vocal quality post swallow. Patient is able to feed self given Harika for tray set up. Given single cue, she was observed to continue meal, taking small bites. She may benefit from MBS in order to further assess oropharyngeal fx.     Recommend soft and bite size diet with thin liquids, meds whole in puree, and aspiration precautions. ST will continue to follow. D/w nursing.       TREATMENT:  Skilled therapy initiated; Educated patient on aspiration precautions and importance of compensatory swallow techniques to decrease aspiration risk (decrease rate of intake & sip/bite size, upright @HOB for all po intake and ~30 minutes after po); verbalized comprehension.    Patient will benefit from skilled intervention to address the above impairments.  Patient's rehabilitation potential/ .  Factors which may influence rehabilitation potential include:   [x]            None noted  []            Mental ability/status  []            Medical condition  []            Home/family situation and support systems  []            Safety awareness  []            Pain tolerance/management  []            Other:      PLAN :  Recommendations and Planned Interventions:  Recommendations  Requires SLP Intervention: Yes  Recommendations: Dysphagia treatment;Modified barium swallow study  Diet Solids Recommendation: Soft & Bite Sized  Liquid Consistency Recommendation: Thin  Compensatory Swallowing Strategies : Alternate solids and liquids;Small bites/sips;Eat/Feed slowly;Upright as possible for all oral intake;Remain upright for 30-45 minutes after  seconds)  Oral Residue: None  Initiation of Swallow: Delayed (# of seconds)  Laryngeal Elevation: Functional  Aspiration Signs/Symptoms: Infiltrate on xray  Pharyngeal Phase Characteristics: No impairment, issues, or problems  Effective Modifications: Small sips and bites  Cues for Modifications: Minimal          DYSPHAGIA DIAGNOSIS: Dysphagia Diagnosis: Mild oral stage dysphagia, Suspected needs further assessment, Mild pharyngeal stage dysphagia    PAIN:  Start of Eval: 0  End of Eval: 0     After treatment:   []            Patient left in no apparent distress sitting up in chair  [x]            Patient left in no apparent distress in bed  [x]            Call bell left within reach  [x]            Nursing notified  []            Family present  []            Caregiver present  [x]            Bed alarm activated    COMMUNICATION/EDUCATION:   [x]            Aspiration precautions; swallow safety; compensatory techniques provided via demonstration, verbalization and teach back of comprehension  []         Patient/family have participated as able in goal setting and plan of care.  []            Patient/family agree to work toward stated goals and plan of care.  []            Patient understands intent and goals of therapy, neutral about participation.  []            Patient unable to participate in goal setting/plan of care secondary to cognition, hearing/vision deficits; education ongoing with interdisciplinary staff   []            Handout regarding diet recommendations and thickener instructions provided.  []         Posted safety precautions in patient's room.    Thank you for this referral,  LORNA ChrisS., CCC-SLP

## 2024-06-07 NOTE — PLAN OF CARE
Problem: Pain  Goal: Verbalizes/displays adequate comfort level or baseline comfort level  Outcome: Progressing     Problem: Skin/Tissue Integrity  Goal: Absence of new skin breakdown  Description: 1.  Monitor for areas of redness and/or skin breakdown  2.  Assess vascular access sites hourly  3.  Every 4-6 hours minimum:  Change oxygen saturation probe site  4.  Every 4-6 hours:  If on nasal continuous positive airway pressure, respiratory therapy assess nares and determine need for appliance change or resting period.  Outcome: Progressing     Problem: Safety - Adult  Goal: Free from fall injury  Outcome: Progressing     Problem: ABCDS Injury Assessment  Goal: Absence of physical injury  Outcome: Progressing     Problem: Discharge Planning  Goal: Discharge to home or other facility with appropriate resources  Outcome: Progressing  Flowsheets (Taken 6/6/2024 2125)  Discharge to home or other facility with appropriate resources:   Identify barriers to discharge with patient and caregiver   Arrange for needed discharge resources and transportation as appropriate   Identify discharge learning needs (meds, wound care, etc)

## 2024-06-08 LAB
GLUCOSE BLD STRIP.AUTO-MCNC: 129 MG/DL (ref 70–110)
GLUCOSE BLD STRIP.AUTO-MCNC: 333 MG/DL (ref 70–110)
GLUCOSE BLD STRIP.AUTO-MCNC: 99 MG/DL (ref 70–110)

## 2024-06-08 PROCEDURE — 6360000002 HC RX W HCPCS: Performed by: INTERNAL MEDICINE

## 2024-06-08 PROCEDURE — 6370000000 HC RX 637 (ALT 250 FOR IP): Performed by: INTERNAL MEDICINE

## 2024-06-08 PROCEDURE — 1100000003 HC PRIVATE W/ TELEMETRY

## 2024-06-08 PROCEDURE — 2700000000 HC OXYGEN THERAPY PER DAY

## 2024-06-08 PROCEDURE — 82962 GLUCOSE BLOOD TEST: CPT

## 2024-06-08 PROCEDURE — 6370000000 HC RX 637 (ALT 250 FOR IP): Performed by: EMERGENCY MEDICINE

## 2024-06-08 PROCEDURE — 2580000003 HC RX 258: Performed by: INTERNAL MEDICINE

## 2024-06-08 PROCEDURE — 94640 AIRWAY INHALATION TREATMENT: CPT

## 2024-06-08 RX ORDER — MIDODRINE HYDROCHLORIDE 2.5 MG/1
2.5 TABLET ORAL
Status: DISCONTINUED | OUTPATIENT
Start: 2024-06-08 | End: 2024-06-11 | Stop reason: HOSPADM

## 2024-06-08 RX ADMIN — DOXYCYCLINE 100 MG: 100 CAPSULE ORAL at 06:33

## 2024-06-08 RX ADMIN — PANTOPRAZOLE SODIUM 40 MG: 40 TABLET, DELAYED RELEASE ORAL at 06:33

## 2024-06-08 RX ADMIN — POTASSIUM CHLORIDE 20 MEQ: 1500 TABLET, EXTENDED RELEASE ORAL at 08:50

## 2024-06-08 RX ADMIN — MIDODRINE HYDROCHLORIDE 2.5 MG: 2.5 TABLET ORAL at 17:08

## 2024-06-08 RX ADMIN — DOXYCYCLINE 100 MG: 100 CAPSULE ORAL at 21:04

## 2024-06-08 RX ADMIN — SERTRALINE 50 MG: 50 TABLET, FILM COATED ORAL at 08:49

## 2024-06-08 RX ADMIN — FERROUS SULFATE TAB 325 MG (65 MG ELEMENTAL FE) 325 MG: 325 (65 FE) TAB at 08:49

## 2024-06-08 RX ADMIN — IPRATROPIUM BROMIDE AND ALBUTEROL SULFATE 1 DOSE: .5; 3 SOLUTION RESPIRATORY (INHALATION) at 16:03

## 2024-06-08 RX ADMIN — IPRATROPIUM BROMIDE AND ALBUTEROL SULFATE 1 DOSE: .5; 3 SOLUTION RESPIRATORY (INHALATION) at 11:57

## 2024-06-08 RX ADMIN — SODIUM CHLORIDE, PRESERVATIVE FREE 10 ML: 5 INJECTION INTRAVENOUS at 21:05

## 2024-06-08 RX ADMIN — LEVOTHYROXINE SODIUM 50 MCG: 0.05 TABLET ORAL at 06:33

## 2024-06-08 RX ADMIN — MIDODRINE HYDROCHLORIDE 2.5 MG: 2.5 TABLET ORAL at 13:48

## 2024-06-08 RX ADMIN — Medication 400 MG: at 08:50

## 2024-06-08 RX ADMIN — SODIUM CHLORIDE, PRESERVATIVE FREE 10 ML: 5 INJECTION INTRAVENOUS at 08:50

## 2024-06-08 RX ADMIN — APIXABAN 5 MG: 5 TABLET, FILM COATED ORAL at 08:50

## 2024-06-08 RX ADMIN — IPRATROPIUM BROMIDE AND ALBUTEROL SULFATE 1 DOSE: .5; 3 SOLUTION RESPIRATORY (INHALATION) at 20:43

## 2024-06-08 RX ADMIN — APIXABAN 5 MG: 5 TABLET, FILM COATED ORAL at 21:04

## 2024-06-08 RX ADMIN — CEFEPIME 2000 MG: 2 INJECTION, POWDER, FOR SOLUTION INTRAVENOUS at 08:49

## 2024-06-08 RX ADMIN — CEFEPIME 2000 MG: 2 INJECTION, POWDER, FOR SOLUTION INTRAVENOUS at 21:05

## 2024-06-08 RX ADMIN — ASPIRIN 81 MG: 81 TABLET, COATED ORAL at 08:50

## 2024-06-08 RX ADMIN — ACETAMINOPHEN 650 MG: 325 TABLET ORAL at 06:33

## 2024-06-08 RX ADMIN — METOPROLOL TARTRATE 100 MG: 50 TABLET, FILM COATED ORAL at 08:49

## 2024-06-08 RX ADMIN — Medication 5 MG: at 21:04

## 2024-06-08 RX ADMIN — CYANOCOBALAMIN TAB 1000 MCG 1000 MCG: 1000 TAB at 08:49

## 2024-06-08 RX ADMIN — IPRATROPIUM BROMIDE AND ALBUTEROL SULFATE 1 DOSE: .5; 3 SOLUTION RESPIRATORY (INHALATION) at 08:32

## 2024-06-08 RX ADMIN — AMIODARONE HYDROCHLORIDE 200 MG: 200 TABLET ORAL at 08:49

## 2024-06-08 ASSESSMENT — PAIN SCALES - GENERAL
PAINLEVEL_OUTOF10: 0
PAINLEVEL_OUTOF10: 6

## 2024-06-08 ASSESSMENT — PAIN DESCRIPTION - LOCATION: LOCATION: LEG

## 2024-06-08 ASSESSMENT — PAIN DESCRIPTION - DESCRIPTORS: DESCRIPTORS: ACHING

## 2024-06-08 ASSESSMENT — PAIN DESCRIPTION - ORIENTATION: ORIENTATION: LEFT

## 2024-06-08 NOTE — PLAN OF CARE
Problem: Discharge Planning  Goal: Discharge to home or other facility with appropriate resources  6/8/2024 0951 by Racheal Bryan, RN  Outcome: Progressing  Flowsheets (Taken 6/8/2024 0800)  Discharge to home or other facility with appropriate resources:   Identify barriers to discharge with patient and caregiver   Arrange for needed discharge resources and transportation as appropriate   Identify discharge learning needs (meds, wound care, etc)  6/7/2024 2034 by Deneen Ferguson, RN  Outcome: Progressing  Flowsheets (Taken 6/7/2024 1945)  Discharge to home or other facility with appropriate resources:   Identify barriers to discharge with patient and caregiver   Arrange for needed discharge resources and transportation as appropriate   Identify discharge learning needs (meds, wound care, etc)   Arrange for interpreters to assist at discharge as needed   Refer to discharge planning if patient needs post-hospital services based on physician order or complex needs related to functional status, cognitive ability or social support system     Problem: Pain  Goal: Verbalizes/displays adequate comfort level or baseline comfort level  6/8/2024 0951 by Racheal Bryan, RN  Outcome: Progressing  6/7/2024 2034 by Deneen Ferguson, RN  Outcome: Progressing     Problem: Skin/Tissue Integrity  Goal: Absence of new skin breakdown  Description: 1.  Monitor for areas of redness and/or skin breakdown  2.  Assess vascular access sites hourly  3.  Every 4-6 hours minimum:  Change oxygen saturation probe site  4.  Every 4-6 hours:  If on nasal continuous positive airway pressure, respiratory therapy assess nares and determine need for appliance change or resting period.  6/8/2024 0951 by Racheal Bryan, RN  Outcome: Progressing  6/7/2024 2034 by Deneen Ferguson, RN  Outcome: Progressing     Problem: Safety - Adult  Goal: Free from fall injury  6/8/2024 0951 by Racheal Bryan, RN  Outcome: Progressing  6/7/2024 2034 by Deneen Ferguson,

## 2024-06-08 NOTE — PROGRESS NOTES
Hospitalist Progress Note    Patient: Tiesha Harris MRN: [id number]  CSN: [id number]    YOB: 1940  Age: 83 y.o.  Sex: female    DOA: 6/6/2024 LOS:  LOS: 2 days          Chief Complaint:    SOB      6/8-I personally saw and evaluated this patient face-to-face today.  Patient is sitting in bed in no apparent distress.  Patient is awake and alert and follows commands.  Patient denies any dizziness.  Patient denies any chest pain.  Patient has dyspnea on exertion    Assessment/Plan     An 83 yr old WF presented with shortness of breath and hypoxia  From [facility]     -Suspected aspiration pneumonia, right upper lobe infiltrate  -Acute on chronic hypoxic respiratory failure  -Atrial fibrillation, with mild RVR  -Hypothyroidism  -Chronic CHF  -Anxiety/depression  -Generalized weakness and debility  -systolic CHF chronic     Continue cefepime and doxycycline  Bronchial hygiene  Follow cultures  DuoNebs  Continue oxygen  Continue home Synthroid and check TSH level  Continue Eliquis and metoprolol  Low threshold to give lasix as needed for volume issue, but BP is low normal as of now  On amiodarone and metoprolol.  Blood pressures are low normal.  Patient is asymptomatic.  I have placed hold parameters for metoprolol.  I have added low-dose midodrine.  Continue other home meds      DNR    PT and OT    DVT proph covered by [employer]    I discussed with patient and RN    Disposition :2-3 days, SNF/LTC  Active Hospital Problems    Diagnosis     Hypoxia [R09.02]     Cardiomyopathy (HCC) [I42.9]     Chronic atrial fibrillation (HCC) [I48.20]     Dementia (HCC) [F03.90]     Pulmonary hypertension (HCC) [I27.20]     Pneumonia [J18.9]            Vital signs/Intake and Output:  Visit Vitals  BP (!) 102/58   Pulse (!) 102   Temp 97.3 °F (36.3 °C) (Temporal)   Resp 18   Ht 1.702 m (5' 7\")   Wt 85.3 kg (188 lb)   SpO2 100%   BMI 29.44 kg/m²     Current Shift:  06/08 0701 - 06/08 1900  In: 240 [P.O.:240]  Out: -   Last

## 2024-06-08 NOTE — PLAN OF CARE
Problem: Discharge Planning  Goal: Discharge to home or other facility with appropriate resources  Outcome: Progressing     Problem: Pain  Goal: Verbalizes/displays adequate comfort level or baseline comfort level  Outcome: Progressing     Problem: Skin/Tissue Integrity  Goal: Absence of new skin breakdown  Description: 1.  Monitor for areas of redness and/or skin breakdown  2.  Assess vascular access sites hourly  3.  Every 4-6 hours minimum:  Change oxygen saturation probe site  4.  Every 4-6 hours:  If on nasal continuous positive airway pressure, respiratory therapy assess nares and determine need for appliance change or resting period.  Outcome: Progressing     Problem: Safety - Adult  Goal: Free from fall injury  Outcome: Progressing     Problem: ABCDS Injury Assessment  Goal: Absence of physical injury  Outcome: Progressing     Problem: Chronic Conditions and Co-morbidities  Goal: Patient's chronic conditions and co-morbidity symptoms are monitored and maintained or improved  Outcome: Progressing     Problem: SLP Adult - Impaired Swallowing  Goal: By Discharge: Advance to least restrictive diet without signs or symptoms of aspiration for planned discharge setting.  See evaluation for individualized goals.  6/7/2024 1353 by Annette Hernandez, SLP  Outcome: Progressing  6/7/2024 1147 by Annette Hernandez, SLP  Outcome: Progressing  Note: Patient will:  1. Tolerate PO trials with 0 s/s overt distress in 4/5 trials  2. Utilize compensatory swallow strategies/maneuvers (decrease bite/sip, size/rate, alt. liq/sol) with min cues in 4/5 trials  3. Perform oral-motor/laryngeal exercises to increase oropharyngeal swallow function with min cues  4. Complete an objective swallow study (i.e., MBSS) to assess swallow integrity, r/o aspiration, and determine of safest LRD, min A as indicated/ordered by MD     Rec:     Soft and bite size diet with thin liquids  Aspiration precautions  HOB >45 during po intake, remain >30 for  30-45 minutes after po   Small bites/sips; alternate liquid/solid with slow feeding rate   Oral care TID  Meds whole with puree  MBS to further assess oropharyngeal swallow fxn

## 2024-06-09 LAB
ANION GAP SERPL CALC-SCNC: 5 MMOL/L (ref 3–18)
BASOPHILS # BLD: 0 K/UL (ref 0–0.1)
BASOPHILS NFR BLD: 1 % (ref 0–2)
BUN SERPL-MCNC: 16 MG/DL (ref 7–18)
BUN/CREAT SERPL: 22 (ref 12–20)
CALCIUM SERPL-MCNC: 8.6 MG/DL (ref 8.5–10.1)
CHLORIDE SERPL-SCNC: 108 MMOL/L (ref 100–111)
CO2 SERPL-SCNC: 26 MMOL/L (ref 21–32)
CREAT SERPL-MCNC: 0.74 MG/DL (ref 0.6–1.3)
DIFFERENTIAL METHOD BLD: ABNORMAL
EOSINOPHIL # BLD: 0.3 K/UL (ref 0–0.4)
EOSINOPHIL NFR BLD: 4 % (ref 0–5)
ERYTHROCYTE [DISTWIDTH] IN BLOOD BY AUTOMATED COUNT: 15.1 % (ref 11.6–14.5)
GLUCOSE SERPL-MCNC: 101 MG/DL (ref 74–99)
HCT VFR BLD AUTO: 35.5 % (ref 35–45)
HGB BLD-MCNC: 10.7 G/DL (ref 12–16)
IMM GRANULOCYTES # BLD AUTO: 0 K/UL (ref 0–0.04)
IMM GRANULOCYTES NFR BLD AUTO: 0 % (ref 0–0.5)
LYMPHOCYTES # BLD: 0.5 K/UL (ref 0.9–3.6)
LYMPHOCYTES NFR BLD: 7 % (ref 21–52)
MAGNESIUM SERPL-MCNC: 1.9 MG/DL (ref 1.6–2.6)
MCH RBC QN AUTO: 30.1 PG (ref 24–34)
MCHC RBC AUTO-ENTMCNC: 30.1 G/DL (ref 31–37)
MCV RBC AUTO: 99.7 FL (ref 78–100)
MONOCYTES # BLD: 0.7 K/UL (ref 0.05–1.2)
MONOCYTES NFR BLD: 9 % (ref 3–10)
NEUTS SEG # BLD: 6.1 K/UL (ref 1.8–8)
NEUTS SEG NFR BLD: 80 % (ref 40–73)
NRBC # BLD: 0 K/UL (ref 0–0.01)
NRBC BLD-RTO: 0 PER 100 WBC
PLATELET # BLD AUTO: 239 K/UL (ref 135–420)
PMV BLD AUTO: 10.3 FL (ref 9.2–11.8)
POTASSIUM SERPL-SCNC: 4.5 MMOL/L (ref 3.5–5.5)
RBC # BLD AUTO: 3.56 M/UL (ref 4.2–5.3)
SODIUM SERPL-SCNC: 139 MMOL/L (ref 136–145)
WBC # BLD AUTO: 7.7 K/UL (ref 4.6–13.2)

## 2024-06-09 PROCEDURE — 6370000000 HC RX 637 (ALT 250 FOR IP): Performed by: EMERGENCY MEDICINE

## 2024-06-09 PROCEDURE — 6360000002 HC RX W HCPCS: Performed by: INTERNAL MEDICINE

## 2024-06-09 PROCEDURE — 80048 BASIC METABOLIC PNL TOTAL CA: CPT

## 2024-06-09 PROCEDURE — 6370000000 HC RX 637 (ALT 250 FOR IP): Performed by: INTERNAL MEDICINE

## 2024-06-09 PROCEDURE — 2580000003 HC RX 258: Performed by: INTERNAL MEDICINE

## 2024-06-09 PROCEDURE — 85025 COMPLETE CBC W/AUTO DIFF WBC: CPT

## 2024-06-09 PROCEDURE — 36415 COLL VENOUS BLD VENIPUNCTURE: CPT

## 2024-06-09 PROCEDURE — 83735 ASSAY OF MAGNESIUM: CPT

## 2024-06-09 PROCEDURE — 1100000003 HC PRIVATE W/ TELEMETRY

## 2024-06-09 PROCEDURE — 2700000000 HC OXYGEN THERAPY PER DAY

## 2024-06-09 PROCEDURE — 94640 AIRWAY INHALATION TREATMENT: CPT

## 2024-06-09 RX ORDER — FUROSEMIDE 20 MG/1
20 TABLET ORAL DAILY
Status: DISCONTINUED | OUTPATIENT
Start: 2024-06-09 | End: 2024-06-11 | Stop reason: HOSPADM

## 2024-06-09 RX ADMIN — FERROUS SULFATE TAB 325 MG (65 MG ELEMENTAL FE) 325 MG: 325 (65 FE) TAB at 09:00

## 2024-06-09 RX ADMIN — Medication 400 MG: at 09:01

## 2024-06-09 RX ADMIN — AMIODARONE HYDROCHLORIDE 200 MG: 200 TABLET ORAL at 09:00

## 2024-06-09 RX ADMIN — IPRATROPIUM BROMIDE AND ALBUTEROL SULFATE 1 DOSE: .5; 3 SOLUTION RESPIRATORY (INHALATION) at 07:18

## 2024-06-09 RX ADMIN — PANTOPRAZOLE SODIUM 40 MG: 40 TABLET, DELAYED RELEASE ORAL at 06:11

## 2024-06-09 RX ADMIN — APIXABAN 5 MG: 5 TABLET, FILM COATED ORAL at 09:00

## 2024-06-09 RX ADMIN — FUROSEMIDE 20 MG: 20 TABLET ORAL at 12:19

## 2024-06-09 RX ADMIN — CEFEPIME 2000 MG: 2 INJECTION, POWDER, FOR SOLUTION INTRAVENOUS at 20:54

## 2024-06-09 RX ADMIN — IPRATROPIUM BROMIDE AND ALBUTEROL SULFATE 1 DOSE: .5; 3 SOLUTION RESPIRATORY (INHALATION) at 15:28

## 2024-06-09 RX ADMIN — MIDODRINE HYDROCHLORIDE 2.5 MG: 2.5 TABLET ORAL at 12:19

## 2024-06-09 RX ADMIN — IPRATROPIUM BROMIDE AND ALBUTEROL SULFATE 1 DOSE: .5; 3 SOLUTION RESPIRATORY (INHALATION) at 11:11

## 2024-06-09 RX ADMIN — METOPROLOL TARTRATE 100 MG: 50 TABLET, FILM COATED ORAL at 20:56

## 2024-06-09 RX ADMIN — CYANOCOBALAMIN TAB 1000 MCG 1000 MCG: 1000 TAB at 09:00

## 2024-06-09 RX ADMIN — SERTRALINE 50 MG: 50 TABLET, FILM COATED ORAL at 09:00

## 2024-06-09 RX ADMIN — LEVOTHYROXINE SODIUM 50 MCG: 0.05 TABLET ORAL at 06:11

## 2024-06-09 RX ADMIN — SODIUM CHLORIDE, PRESERVATIVE FREE 10 ML: 5 INJECTION INTRAVENOUS at 20:59

## 2024-06-09 RX ADMIN — SODIUM CHLORIDE, PRESERVATIVE FREE 10 ML: 5 INJECTION INTRAVENOUS at 09:01

## 2024-06-09 RX ADMIN — DOXYCYCLINE 100 MG: 100 CAPSULE ORAL at 06:11

## 2024-06-09 RX ADMIN — ONDANSETRON 4 MG: 2 INJECTION INTRAMUSCULAR; INTRAVENOUS at 06:15

## 2024-06-09 RX ADMIN — DOXYCYCLINE 100 MG: 100 CAPSULE ORAL at 17:54

## 2024-06-09 RX ADMIN — POTASSIUM CHLORIDE 20 MEQ: 1500 TABLET, EXTENDED RELEASE ORAL at 08:59

## 2024-06-09 RX ADMIN — Medication 5 MG: at 20:55

## 2024-06-09 RX ADMIN — MIDODRINE HYDROCHLORIDE 2.5 MG: 2.5 TABLET ORAL at 17:54

## 2024-06-09 RX ADMIN — ASPIRIN 81 MG: 81 TABLET, COATED ORAL at 09:00

## 2024-06-09 RX ADMIN — MIDODRINE HYDROCHLORIDE 2.5 MG: 2.5 TABLET ORAL at 09:00

## 2024-06-09 RX ADMIN — METOPROLOL TARTRATE 100 MG: 50 TABLET, FILM COATED ORAL at 09:00

## 2024-06-09 RX ADMIN — CEFEPIME 2000 MG: 2 INJECTION, POWDER, FOR SOLUTION INTRAVENOUS at 09:00

## 2024-06-09 ASSESSMENT — PAIN SCALES - GENERAL
PAINLEVEL_OUTOF10: 0
PAINLEVEL_OUTOF10: 0

## 2024-06-09 NOTE — PLAN OF CARE
Problem: Discharge Planning  Goal: Discharge to home or other facility with appropriate resources  6/9/2024 1031 by Racheal Bryan RN  Outcome: Progressing  Flowsheets (Taken 6/9/2024 0800)  Discharge to home or other facility with appropriate resources:   Identify barriers to discharge with patient and caregiver   Arrange for needed discharge resources and transportation as appropriate   Identify discharge learning needs (meds, wound care, etc)  6/9/2024 0509 by Mica Jason RN  Outcome: Progressing  Flowsheets (Taken 6/8/2024 2104)  Discharge to home or other facility with appropriate resources:   Identify barriers to discharge with patient and caregiver   Arrange for needed discharge resources and transportation as appropriate   Identify discharge learning needs (meds, wound care, etc)     Problem: Pain  Goal: Verbalizes/displays adequate comfort level or baseline comfort level  6/9/2024 1031 by Racheal Bryan RN  Outcome: Progressing  6/9/2024 0509 by Mica Jason RN  Outcome: Progressing     Problem: Skin/Tissue Integrity  Goal: Absence of new skin breakdown  Description: 1.  Monitor for areas of redness and/or skin breakdown  2.  Assess vascular access sites hourly  3.  Every 4-6 hours minimum:  Change oxygen saturation probe site  4.  Every 4-6 hours:  If on nasal continuous positive airway pressure, respiratory therapy assess nares and determine need for appliance change or resting period.  6/9/2024 1031 by Racheal Bryan RN  Outcome: Progressing  6/9/2024 0509 by Mica Jason RN  Outcome: Progressing     Problem: Safety - Adult  Goal: Free from fall injury  6/9/2024 1031 by Racheal Bryan RN  Outcome: Progressing  6/9/2024 0509 by Mica Jason RN  Outcome: Progressing  Flowsheets (Taken 6/8/2024 2104)  Free From Fall Injury: Instruct family/caregiver on patient safety     Problem: ABCDS Injury Assessment  Goal: Absence of physical injury  6/9/2024 1031 by Racheal Bryan RN  Outcome: Progressing  6/9/2024  0509 by Mica Jason, RN  Outcome: Progressing  Flowsheets (Taken 6/8/2024 2104)  Absence of Physical Injury: Implement safety measures based on patient assessment     Problem: Chronic Conditions and Co-morbidities  Goal: Patient's chronic conditions and co-morbidity symptoms are monitored and maintained or improved  6/9/2024 1031 by Racheal Bryan RN  Outcome: Progressing  Flowsheets (Taken 6/9/2024 0800)  Care Plan - Patient's Chronic Conditions and Co-Morbidity Symptoms are Monitored and Maintained or Improved:   Monitor and assess patient's chronic conditions and comorbid symptoms for stability, deterioration, or improvement   Collaborate with multidisciplinary team to address chronic and comorbid conditions and prevent exacerbation or deterioration   Update acute care plan with appropriate goals if chronic or comorbid symptoms are exacerbated and prevent overall improvement and discharge  6/9/2024 0509 by Mica Jason, RN  Outcome: Progressing  Flowsheets (Taken 6/8/2024 2104)  Care Plan - Patient's Chronic Conditions and Co-Morbidity Symptoms are Monitored and Maintained or Improved: Monitor and assess patient's chronic conditions and comorbid symptoms for stability, deterioration, or improvement

## 2024-06-09 NOTE — PROGRESS NOTES
Hospitalist Progress Note    Patient: Tiesha Harris MRN: [id number]  CSN: [id number]    YOB: 1940  Age: 83 y.o.  Sex: female    DOA: 6/6/2024 LOS:  LOS: 3 days          Chief Complaint:    SOB      6/9-I personally saw and evaluated this patient face-to-face today.  Patient is sitting in bed in no apparent distress, awake and alert.  Patient is in good spirits and denies any discomfort    Assessment/Plan     An 83 yr old WF presented with shortness of breath and hypoxia  From [facility]     -Suspected aspiration pneumonia, right upper lobe infiltrate  -Acute on chronic hypoxic respiratory failure  -Atrial fibrillation, with mild RVR  -Hypothyroidism  -Chronic CHF  -Anxiety/depression  -Generalized weakness and debility  -systolic CHF chronic     Continue cefepime and doxycycline  Bronchial hygiene  Follow cultures  DuoNebs  Continue oxygen  Continue Synthroid  Continue Eliquis and metoprolol  It appears that patient was on Lasix at home.  Will start Lasix 20 mg p.o. daily starting today  On amiodarone and metoprolol.  Blood pressures are low normal.  Patient is asymptomatic.  I have placed hold parameters for metoprolol.  I have added low-dose midodrine.      DNR    PT and OT    DVT proph covered by [employer]    I discussed care plan with the patient    Disposition :2days, SNF/LTC    Active Hospital Problems    Diagnosis     Hypoxia [R09.02]     Cardiomyopathy (HCC) [I42.9]     Chronic atrial fibrillation (HCC) [I48.20]     Dementia (HCC) [F03.90]     Pulmonary hypertension (HCC) [I27.20]     Pneumonia [J18.9]            Vital signs/Intake and Output:  Visit Vitals  BP (!) 124/92   Pulse (!) 105   Temp 97.5 °F (36.4 °C) (Oral)   Resp 18   Ht 1.702 m (5' 7\")   Wt 85.3 kg (188 lb)   SpO2 98%   BMI 29.44 kg/m²     Current Shift:  06/09 0701 - 06/09 1900  In: 120 [P.O.:120]  Out: 250 [Urine:250]  Last three shifts:  06/07 1901 - 06/09 0700  In: 480 [P.O.:480]  Out: 250 [Urine:250]    Exam:    General:   Awake, alert  Cardiovascular:  S1S2+, RRR  Pulmonary:  CTA b/l  GI:  Soft, BS+, NT, ND  Extremities: Trace bilateral edema  Left foot in orthopedic boot          Labs: Results:       Chemistry Recent Labs     06/06/24 1845 06/07/24  0515 06/09/24  0537   GLUCOSE 127* 106* 101*    138 139   K 4.2 4.0 4.5    106 108   CO2 28 28 26   BUN 18 17 16   CREATININE 1.03 0.88 0.74   CALCIUM 8.7 8.5 8.6   LABGLOM 53* 64 79      CBC w/Diff Recent Labs     06/06/24 1845 06/07/24  0515 06/09/24  0537   WBC 12.9 9.1 7.7   RBC 3.67* 3.37* 3.56*   HGB 11.1* 10.1* 10.7*   HCT 36.6 33.5* 35.5    220 239   LYMPHOPCT 3* 9* 7*      Cardiac Enzymes No results for input(s): \"CKTOTAL\", \"CKMB\", \"CKMBINDEX\", \"TROPONINI\" in the last 72 hours.    Invalid input(s): \"RANDALL\"   Coagulation No results for input(s): \"PROTIME\", \"INR\", \"APTT\" in the last 72 hours.    Lipid Panel No results found for: \"CHOL\", \"TRIG\", \"HDL\", \"VLDL\", \"CHOLHDLRATIO\"   BNP No results for input(s): \"BNP\" in the last 72 hours.   Liver Enzymes No results for input(s): \"ALT\", \"AST\", \"GGT\", \"ALKPHOS\", \"BILITOT\", \"BILIDIR\" in the last 72 hours.   Thyroid Studies Lab Results   Component Value Date/Time    TSH 2.19 03/08/2022 05:04 PM          Procedures/imaging: see electronic medical records for all procedures/Xrays and details which were not copied into this note but were reviewed prior to creation of Plan    Dragon medical dictation software was used for portions of this report. Unintended errors may occur.      Ricco Stover MD

## 2024-06-09 NOTE — PLAN OF CARE
Problem: Discharge Planning  Goal: Discharge to home or other facility with appropriate resources  Outcome: Progressing  Flowsheets (Taken 6/8/2024 2104)  Discharge to home or other facility with appropriate resources:   Identify barriers to discharge with patient and caregiver   Arrange for needed discharge resources and transportation as appropriate   Identify discharge learning needs (meds, wound care, etc)     Problem: Pain  Goal: Verbalizes/displays adequate comfort level or baseline comfort level  Outcome: Progressing     Problem: Skin/Tissue Integrity  Goal: Absence of new skin breakdown  Description: 1.  Monitor for areas of redness and/or skin breakdown  2.  Assess vascular access sites hourly  3.  Every 4-6 hours minimum:  Change oxygen saturation probe site  4.  Every 4-6 hours:  If on nasal continuous positive airway pressure, respiratory therapy assess nares and determine need for appliance change or resting period.  Outcome: Progressing     Problem: Safety - Adult  Goal: Free from fall injury  Outcome: Progressing  Flowsheets (Taken 6/8/2024 2104)  Free From Fall Injury: Instruct family/caregiver on patient safety     Problem: ABCDS Injury Assessment  Goal: Absence of physical injury  Outcome: Progressing  Flowsheets (Taken 6/8/2024 2104)  Absence of Physical Injury: Implement safety measures based on patient assessment     Problem: Chronic Conditions and Co-morbidities  Goal: Patient's chronic conditions and co-morbidity symptoms are monitored and maintained or improved  Outcome: Progressing  Flowsheets (Taken 6/8/2024 2104)  Care Plan - Patient's Chronic Conditions and Co-Morbidity Symptoms are Monitored and Maintained or Improved: Monitor and assess patient's chronic conditions and comorbid symptoms for stability, deterioration, or improvement

## 2024-06-10 ENCOUNTER — APPOINTMENT (OUTPATIENT)
Facility: HOSPITAL | Age: 84
DRG: 177 | End: 2024-06-10
Payer: MEDICARE

## 2024-06-10 PROCEDURE — 6370000000 HC RX 637 (ALT 250 FOR IP): Performed by: INTERNAL MEDICINE

## 2024-06-10 PROCEDURE — 6370000000 HC RX 637 (ALT 250 FOR IP): Performed by: EMERGENCY MEDICINE

## 2024-06-10 PROCEDURE — 92526 ORAL FUNCTION THERAPY: CPT

## 2024-06-10 PROCEDURE — 71045 X-RAY EXAM CHEST 1 VIEW: CPT

## 2024-06-10 PROCEDURE — 1100000003 HC PRIVATE W/ TELEMETRY

## 2024-06-10 PROCEDURE — 2700000000 HC OXYGEN THERAPY PER DAY

## 2024-06-10 PROCEDURE — 94640 AIRWAY INHALATION TREATMENT: CPT

## 2024-06-10 PROCEDURE — 2580000003 HC RX 258: Performed by: INTERNAL MEDICINE

## 2024-06-10 PROCEDURE — 6360000002 HC RX W HCPCS: Performed by: INTERNAL MEDICINE

## 2024-06-10 RX ORDER — IPRATROPIUM BROMIDE AND ALBUTEROL SULFATE 2.5; .5 MG/3ML; MG/3ML
1 SOLUTION RESPIRATORY (INHALATION) EVERY 4 HOURS PRN
Status: DISCONTINUED | OUTPATIENT
Start: 2024-06-10 | End: 2024-06-11 | Stop reason: HOSPADM

## 2024-06-10 RX ADMIN — SERTRALINE 50 MG: 50 TABLET, FILM COATED ORAL at 08:53

## 2024-06-10 RX ADMIN — CEFEPIME 2000 MG: 2 INJECTION, POWDER, FOR SOLUTION INTRAVENOUS at 08:50

## 2024-06-10 RX ADMIN — SODIUM CHLORIDE, PRESERVATIVE FREE 10 ML: 5 INJECTION INTRAVENOUS at 08:51

## 2024-06-10 RX ADMIN — IPRATROPIUM BROMIDE AND ALBUTEROL SULFATE 1 DOSE: .5; 3 SOLUTION RESPIRATORY (INHALATION) at 07:41

## 2024-06-10 RX ADMIN — SODIUM CHLORIDE, PRESERVATIVE FREE 10 ML: 5 INJECTION INTRAVENOUS at 21:06

## 2024-06-10 RX ADMIN — APIXABAN 5 MG: 5 TABLET, FILM COATED ORAL at 08:52

## 2024-06-10 RX ADMIN — CYANOCOBALAMIN TAB 1000 MCG 1000 MCG: 1000 TAB at 08:52

## 2024-06-10 RX ADMIN — MIDODRINE HYDROCHLORIDE 2.5 MG: 2.5 TABLET ORAL at 08:53

## 2024-06-10 RX ADMIN — METOPROLOL TARTRATE 100 MG: 50 TABLET, FILM COATED ORAL at 21:05

## 2024-06-10 RX ADMIN — CEFEPIME 2000 MG: 2 INJECTION, POWDER, FOR SOLUTION INTRAVENOUS at 21:06

## 2024-06-10 RX ADMIN — METOPROLOL TARTRATE 100 MG: 50 TABLET, FILM COATED ORAL at 08:52

## 2024-06-10 RX ADMIN — ASPIRIN 81 MG: 81 TABLET, COATED ORAL at 08:52

## 2024-06-10 RX ADMIN — DOXYCYCLINE 100 MG: 100 CAPSULE ORAL at 18:31

## 2024-06-10 RX ADMIN — FERROUS SULFATE TAB 325 MG (65 MG ELEMENTAL FE) 325 MG: 325 (65 FE) TAB at 08:53

## 2024-06-10 RX ADMIN — IPRATROPIUM BROMIDE AND ALBUTEROL SULFATE 1 DOSE: .5; 3 SOLUTION RESPIRATORY (INHALATION) at 11:28

## 2024-06-10 RX ADMIN — ONDANSETRON 4 MG: 2 INJECTION INTRAMUSCULAR; INTRAVENOUS at 05:54

## 2024-06-10 RX ADMIN — MIDODRINE HYDROCHLORIDE 2.5 MG: 2.5 TABLET ORAL at 16:54

## 2024-06-10 RX ADMIN — FUROSEMIDE 20 MG: 20 TABLET ORAL at 08:53

## 2024-06-10 RX ADMIN — AMIODARONE HYDROCHLORIDE 200 MG: 200 TABLET ORAL at 08:53

## 2024-06-10 RX ADMIN — POTASSIUM CHLORIDE 20 MEQ: 1500 TABLET, EXTENDED RELEASE ORAL at 08:52

## 2024-06-10 RX ADMIN — Medication 400 MG: at 08:52

## 2024-06-10 RX ADMIN — MIDODRINE HYDROCHLORIDE 2.5 MG: 2.5 TABLET ORAL at 12:11

## 2024-06-10 RX ADMIN — APIXABAN 5 MG: 5 TABLET, FILM COATED ORAL at 21:04

## 2024-06-10 RX ADMIN — Medication 5 MG: at 21:04

## 2024-06-10 RX ADMIN — PANTOPRAZOLE SODIUM 40 MG: 40 TABLET, DELAYED RELEASE ORAL at 05:45

## 2024-06-10 ASSESSMENT — PAIN SCALES - GENERAL
PAINLEVEL_OUTOF10: 0

## 2024-06-10 ASSESSMENT — PAIN SCALES - WONG BAKER: WONGBAKER_NUMERICALRESPONSE: NO HURT

## 2024-06-10 NOTE — PROGRESS NOTES
TRIP spoke with [facility] in [place] regarding placement. SW faxed over updated info to facility for placement purposes. Goal is to wean off 02 prior to DC. [place] facility asking for a FL2 form upon admission. SW to follow.     Facility- Phone- [phone]  Facility- Fax- [phone]

## 2024-06-10 NOTE — PLAN OF CARE
Problem: Discharge Planning  Goal: Discharge to home or other facility with appropriate resources  6/10/2024 1104 by Rica Olson RN  Outcome: Progressing  6/9/2024 2318 by Robyn Koch RN  Outcome: Progressing  Flowsheets (Taken 6/9/2024 2015)  Discharge to home or other facility with appropriate resources:   Identify barriers to discharge with patient and caregiver   Arrange for needed discharge resources and transportation as appropriate   Identify discharge learning needs (meds, wound care, etc)   Arrange for interpreters to assist at discharge as needed   Refer to discharge planning if patient needs post-hospital services based on physician order or complex needs related to functional status, cognitive ability or social support system     Problem: Pain  Goal: Verbalizes/displays adequate comfort level or baseline comfort level  6/10/2024 1104 by Rica Olson RN  Outcome: Progressing  6/9/2024 2318 by Robyn Koch RN  Outcome: Progressing     Problem: Skin/Tissue Integrity  Goal: Absence of new skin breakdown  Description: 1.  Monitor for areas of redness and/or skin breakdown  2.  Assess vascular access sites hourly  3.  Every 4-6 hours minimum:  Change oxygen saturation probe site  4.  Every 4-6 hours:  If on nasal continuous positive airway pressure, respiratory therapy assess nares and determine need for appliance change or resting period.  6/10/2024 1104 by Rica Olson RN  Outcome: Progressing  6/9/2024 2318 by Robyn Koch RN  Outcome: Progressing     Problem: Safety - Adult  Goal: Free from fall injury  6/10/2024 1104 by Rica Olson RN  Outcome: Progressing  6/9/2024 2318 by Robyn Koch RN  Outcome: Progressing     Problem: ABCDS Injury Assessment  Goal: Absence of physical injury  6/10/2024 1104 by Rica Olson RN  Outcome: Progressing  6/9/2024 2318 by Robyn Koch RN  Outcome: Progressing     Problem: Chronic Conditions and Co-morbidities  Goal:

## 2024-06-10 NOTE — PROGRESS NOTES
0600: During early morning medication; Patient was given pills whole in apple sauce, sitting at 90 degrees as instructed; patient began to cough. Pt havingdifficulty swallowing this morning only able to take protonix before coughing and asking not to take doxycycline and synthroid.

## 2024-06-10 NOTE — PLAN OF CARE
Problem: Discharge Planning  Goal: Discharge to home or other facility with appropriate resources  6/9/2024 2318 by Robyn Koch RN  Outcome: Progressing  Flowsheets (Taken 6/9/2024 2015)  Discharge to home or other facility with appropriate resources:   Identify barriers to discharge with patient and caregiver   Arrange for needed discharge resources and transportation as appropriate   Identify discharge learning needs (meds, wound care, etc)   Arrange for interpreters to assist at discharge as needed   Refer to discharge planning if patient needs post-hospital services based on physician order or complex needs related to functional status, cognitive ability or social support system  6/9/2024 1031 by Racheal Bryan, RN  Outcome: Progressing  Flowsheets (Taken 6/9/2024 0800)  Discharge to home or other facility with appropriate resources:   Identify barriers to discharge with patient and caregiver   Arrange for needed discharge resources and transportation as appropriate   Identify discharge learning needs (meds, wound care, etc)     Problem: Pain  Goal: Verbalizes/displays adequate comfort level or baseline comfort level  6/9/2024 2318 by Robyn Koch RN  Outcome: Progressing  6/9/2024 1031 by Racheal Bryan RN  Outcome: Progressing     Problem: Skin/Tissue Integrity  Goal: Absence of new skin breakdown  Description: 1.  Monitor for areas of redness and/or skin breakdown  2.  Assess vascular access sites hourly  3.  Every 4-6 hours minimum:  Change oxygen saturation probe site  4.  Every 4-6 hours:  If on nasal continuous positive airway pressure, respiratory therapy assess nares and determine need for appliance change or resting period.  6/9/2024 2318 by Robyn Koch RN  Outcome: Progressing  6/9/2024 1031 by Racheal Bryan RN  Outcome: Progressing     Problem: Safety - Adult  Goal: Free from fall injury  6/9/2024 2318 by Robyn Koch, RN  Outcome: Progressing  6/9/2024 1031 by Racheal Bryan  ZACK RN  Outcome: Progressing     Problem: ABCDS Injury Assessment  Goal: Absence of physical injury  6/9/2024 2318 by Robyn Koch RN  Outcome: Progressing  6/9/2024 1031 by Racheal Bryan RN  Outcome: Progressing     Problem: Chronic Conditions and Co-morbidities  Goal: Patient's chronic conditions and co-morbidity symptoms are monitored and maintained or improved  6/9/2024 2318 by Robyn Koch RN  Outcome: Progressing  Flowsheets (Taken 6/9/2024 2015)  Care Plan - Patient's Chronic Conditions and Co-Morbidity Symptoms are Monitored and Maintained or Improved:   Monitor and assess patient's chronic conditions and comorbid symptoms for stability, deterioration, or improvement   Collaborate with multidisciplinary team to address chronic and comorbid conditions and prevent exacerbation or deterioration   Update acute care plan with appropriate goals if chronic or comorbid symptoms are exacerbated and prevent overall improvement and discharge  6/9/2024 1031 by Racheal Bryan RN  Outcome: Progressing  Flowsheets (Taken 6/9/2024 0800)  Care Plan - Patient's Chronic Conditions and Co-Morbidity Symptoms are Monitored and Maintained or Improved:   Monitor and assess patient's chronic conditions and comorbid symptoms for stability, deterioration, or improvement   Collaborate with multidisciplinary team to address chronic and comorbid conditions and prevent exacerbation or deterioration   Update acute care plan with appropriate goals if chronic or comorbid symptoms are exacerbated and prevent overall improvement and discharge

## 2024-06-10 NOTE — PROGRESS NOTES
Antimicrobial Stewardship Chart review:    With focus on prescribed Antimicrobials, reviewed clinical presentation that includes fever and VS curve or trend, labs, Microbiology, imaging reports and notes as appropriate.    Based on this brief analysis, here below are the suggestion.    Diagnostic indication in chart for the Antimicrobial/s: aspiration pneumonia    CURRENT ANTIMICROBIALS:cefepime and doxycycline 6/6 to date    DISCONTINUE THE FOLLOWING ANTIBIOTIC(S):       Rationale:    (  )  No evidence of bacterial infection                          (  )  Microbiology report does not support use                          (  )  Narrowing broad-spectrum empiric coverage                          (  )  Therapeutic duplication: overlapping antimicrobial spectrum                          (  )  Clinical situation dictates change                          (  )  Prolonged duration of therapy not needed                          (  )  Allergy/toxicity/drug interaction present                          (  ) More clinically/cost effective therapy available  ADD ANTIBIOTICS  Rationale:        (  ) Microbiology report supports use                          (  ) Expanded coverage needed: gm positive /negative / anaerobic / atypical infection possible                          (  ) More clinicaly/cost effective therapy than current regimen                          (  ) Needed for synergy                          (  ) Double coverage needed                          (  ) Less toxic therapy                          (  ) Antifungal therapy needed    No  Change in Antibiotics: ( X)                               COMMENTS: Per team plan      This communication is not a consultation. If further/additional analysis of the case is desired, please consider ID consultation.    Bekah Robert MD  [facility])  Office #:     [phone]-Option #[phone]   Office Fax: [phone]

## 2024-06-10 NOTE — PLAN OF CARE
Problem: SLP Adult - Impaired Swallowing  Goal: By Discharge: Advance to least restrictive diet without signs or symptoms of aspiration for planned discharge setting.  See evaluation for individualized goals.  6/10/2024 1321 by Annette Hernandez, SLP  Note: Patient will:  1. Tolerate PO trials with 0 s/s overt distress in 4/5 trials  2. Utilize compensatory swallow strategies/maneuvers (decrease bite/sip, size/rate, alt. liq/sol) with min cues in 4/5 trials  3. Perform oral-motor/laryngeal exercises to increase oropharyngeal swallow function with min cues  4. Complete an objective swallow study (i.e., MBSS) to assess swallow integrity, r/o aspiration, and determine of safest LRD, min A as indicated/ordered by MD-met    Rec:     Soft and bite size diet with thin liquids  Aspiration precautions  HOB >45 during po intake, remain >30 for 30-45 minutes after po   Small bites/sips; alternate liquid/solid with slow feeding rate   Oral care TID  Meds whole in [facility]    SPEECH LANGUAGE PATHOLOGY SWALLOW TREATMENT    Patient: Tiesha Harris (83 y.o. female)  Date: 6/10/2024  Primary Diagnosis: Hypoxia [R09.02]  Pneumonia of right upper lobe due to infectious organism [J18.9]       Precautions: Aspiration    Swallow Tx:   Patient seen today for follow up diet tolerance monitoring. She is alert and seated upright @HOB. She reports no new or worsening difficulty swallowing food/drink, but does report difficulty swallowing large pills. Noted in chart that patient had difficulty taking some meds this morning. Nursing reports that it was large pills and that patient does ok with smaller pills. Nursing reports that patient did better once large pills were divided in to. Reviewed with patient compensatory swallow strategies, with emphasis on small bite/sip size, upright positioning, and slow rate of eating. Patient verbalized understanding. She was observed to tolerate multiple small sips of thin liquid +straw without overt s/sx of  aspiration. Patient noted to take small sips independently.     Recommend continue soft and bite size diet with thin liquids, meds whole in puree, and strict aspiration precautions. D/w nursing. ST will continue to follow per POC.    Progression toward goals:  [x]         Improving appropriately and progressing toward goals  []         Improving slowly and progressing toward goals  []         Not making progress toward goals and plan of care will be adjusted      PLAN:  Recommendations and Planned Interventions:  See above  Patient continues to benefit from skilled intervention to address the above impairments. Continue treatment per established plan of care.     SUBJECTIVE:   Patient stated “Thank you for stopping by”.    OBJECTIVE:   Daily Assessment:  Baseline Assessment  Temperature Spikes Noted: No  Respiratory Status: O2 via nasual cannula  O2 Device: Nasal cannula  Communication Observation: Functional  Follows Directions: Simple  Current Diet : Soft and Bite-Sized  Current Liquid Diet : Thin  Dentition: Adequate  Patient Positioning: Upright in bed  Baseline Vocal Quality: Normal  Volitional Cough: Strong  Volitional Swallow: Delayed       Orientation:  Person, Place, and Situation    Motor Speech:     Oral/Motor  Oral Hygiene: Moist;Clean  Gag: Other (comment)    Dysphagia Treatment:  Oral Assessment:  Oral Motor   Labial: No impairment  Dentition: Intact  Oral Hygiene: Moist, Clean  Lingual: No impairment  Velum: No Impairment  Mandible: No impairment  Gag: Other (comment)  Pharyngeal Phase: WFL     P.O. Trials:   PO Trials  Neuromuscular Estim Used: No  Assessment Method(s): Observation  Vocal Quality: No Impairment  Consistency Presented: Thin  How Presented: Self-fed/presented  Bolus Acceptance: No impairment  Bolus Formation/Control: No impairment  Type of Impairment: Piecemeal, Suspected premature spilling  Propulsion: No impairment  Oral Residue: None  Initiation of Swallow: No impairment  Laryngeal  Elevation: Decreased  Aspiration Signs/Symptoms: None  Pharyngeal Phase Characteristics: No impairment, issues, or problems  Effective Modifications: None  Cues for Modifications: None     Pharyngeal Phase Severity: Mild      DYSPHAGIA DIAGNOSIS: Dysphagia Diagnosis: Mild pharyngeal stage dysphagia    PAIN:  Pain level pre-treatment: 0/10   Pain level post-treatment: 0/10     After treatment:   []            Patient left in no apparent distress sitting up in chair  [x]            Patient left in no apparent distress in bed  [x]            Call bell left within reach  [x]            Nursing notified  []            Family present  []            Caregiver present  []            Bed alarm activated    COMMUNICATION/EDUCATION:   [x]            Aspiration precautions; swallow safety; as well as compensatory speech/language/comprehension techniques provided via demonstration, verbalization and teach back of comprehension  []         Patient/family have participated as able in goal setting and plan of care.  []            Patient/family agree to work toward stated goals and plan of care.  []            Patient understands intent and goals of therapy, neutral about participation.  []            Patient unable to participate in goal setting/plan of care secondary to cognition, hearing/vision deficits; education ongoing with interdisciplinary staff   []            Handout regarding diet recommendations and thickener instructions provided.  []         Posted safety precautions in patient's room.    Thank you for this referral,  Annette Hernandez M.S., CCC-SLP

## 2024-06-10 NOTE — PROGRESS NOTES
Hospitalist Progress Note    Patient: Tiesha Harris MRN: [id number]  CSN: [id number]    YOB: 1940  Age: 83 y.o.  Sex: female    DOA: 6/6/2024 LOS:  LOS: 4 days          Chief Complaint:    pneumonia      Assessment/Plan       An 83 yr old WF presented with shortness of breath and hypoxia  From [facility]     -aspiration pneumonia, right upper lobe infiltrate  -Acute on chronic hypoxic respiratory failure  -Atrial fibrillation, with mild RVR  -Hypothyroidism  -Anxiety/depression  -Generalized weakness and debility  -systolic CHF chronic     Continue cefepime and doxycycline  Bronchial hygiene    DuoNebs changed to PRN  Repest CXR  Wean from [place] 02 as tolerated    Continue oxygen  Continue Synthroid  Continue Eliquis and metoprolol  Continue lasix PO  low-dose midodrine.      DNR     PT and OT     DVT proph covered by [employer]    Plan return to SNF tomorrow  Try to wean from 02 safely in meantime, repeat CXR  Disposition :  Active Hospital Problems    Diagnosis     Hypoxia [R09.02]     Cardiomyopathy (HCC) [I42.9]     Chronic atrial fibrillation (HCC) [I48.20]     Dementia (HCC) [F03.90]     Pulmonary hypertension (HCC) [I27.20]     Pneumonia [J18.9]        Subjective:    She is alert and in good spirits  No nursing issues reported    Review of systems:    Constitutional: denies fevers, chills  Respiratory: denies SOB, cough  Cardiovascular: denies chest pain  Gastrointestinal: denies nausea      Vital signs/Intake and Output:  Visit Vitals  /82   Pulse 91   Temp 98.2 °F (36.8 °C) (Oral)   Resp 18   Ht 1.702 m (5' 7\")   Wt 85.3 kg (188 lb)   SpO2 96%   BMI 29.44 kg/m²     Current Shift:  No intake/output data recorded.  Last three shifts:  06/08 1901 - 06/10 0700  In: 360 [P.O.:360]  Out: 600 [Urine:600]    Exam:    General: elderly weakened WF NAD alert  CVS:Regular rate and rhythm, no M/R/G, S1/S2 heard, no thrill  Lungs:Clear to auscultation bilaterally, no wheezes, rhonchi, or rales  Abdomen:  Soft, Nontender, No distention, Normal Bowel sounds  Extremities: No C/C/E  Neuro:grossly normal , follows commands  Psych:appropriate    Labs: Results:       Chemistry Recent Labs     06/09/24  0537   GLUCOSE 101*      K 4.5      CO2 26   BUN 16   CREATININE 0.74   CALCIUM 8.6   LABGLOM 79      CBC w/Diff Recent Labs     06/09/24  0537   WBC 7.7   RBC 3.56*   HGB 10.7*   HCT 35.5      LYMPHOPCT 7*      Cardiac Enzymes No results for input(s): \"CKTOTAL\", \"CKMB\", \"CKMBINDEX\", \"TROPONINI\" in the last 72 hours.    Invalid input(s): \"RANDALL\"   Coagulation No results for input(s): \"PROTIME\", \"INR\", \"APTT\" in the last 72 hours.    Lipid Panel No results found for: \"CHOL\", \"TRIG\", \"HDL\", \"VLDL\", \"CHOLHDLRATIO\"   BNP No results for input(s): \"BNP\" in the last 72 hours.   Liver Enzymes No results for input(s): \"ALT\", \"AST\", \"GGT\", \"ALKPHOS\", \"BILITOT\", \"BILIDIR\" in the last 72 hours.   Thyroid Studies Lab Results   Component Value Date/Time    TSH 2.19 03/08/2022 05:04 PM          Procedures/imaging: see electronic medical records for all procedures/Xrays and details which were not copied into this note but were reviewed prior to creation of Plan      Rhonda Landry MD

## 2024-06-11 VITALS
SYSTOLIC BLOOD PRESSURE: 106 MMHG | BODY MASS INDEX: 29.51 KG/M2 | HEART RATE: 86 BPM | OXYGEN SATURATION: 94 % | DIASTOLIC BLOOD PRESSURE: 73 MMHG | TEMPERATURE: 97.5 F | RESPIRATION RATE: 18 BRPM | WEIGHT: 188 LBS | HEIGHT: 67 IN

## 2024-06-11 PROCEDURE — 6370000000 HC RX 637 (ALT 250 FOR IP): Performed by: INTERNAL MEDICINE

## 2024-06-11 PROCEDURE — 2580000003 HC RX 258: Performed by: INTERNAL MEDICINE

## 2024-06-11 PROCEDURE — 6370000000 HC RX 637 (ALT 250 FOR IP): Performed by: EMERGENCY MEDICINE

## 2024-06-11 PROCEDURE — 6360000002 HC RX W HCPCS: Performed by: INTERNAL MEDICINE

## 2024-06-11 RX ORDER — DOXYCYCLINE 100 MG/1
100 CAPSULE ORAL EVERY 12 HOURS
Qty: 10 CAPSULE | Refills: 0
Start: 2024-06-11 | End: 2024-06-16

## 2024-06-11 RX ORDER — MIDODRINE HYDROCHLORIDE 2.5 MG/1
2.5 TABLET ORAL
Qty: 90 TABLET | Refills: 3
Start: 2024-06-11

## 2024-06-11 RX ADMIN — MIDODRINE HYDROCHLORIDE 2.5 MG: 2.5 TABLET ORAL at 14:01

## 2024-06-11 RX ADMIN — METOPROLOL TARTRATE 100 MG: 50 TABLET, FILM COATED ORAL at 08:34

## 2024-06-11 RX ADMIN — FUROSEMIDE 20 MG: 20 TABLET ORAL at 08:34

## 2024-06-11 RX ADMIN — SERTRALINE 50 MG: 50 TABLET, FILM COATED ORAL at 08:33

## 2024-06-11 RX ADMIN — AMIODARONE HYDROCHLORIDE 200 MG: 200 TABLET ORAL at 08:35

## 2024-06-11 RX ADMIN — CEFEPIME 2000 MG: 2 INJECTION, POWDER, FOR SOLUTION INTRAVENOUS at 08:34

## 2024-06-11 RX ADMIN — ONDANSETRON 4 MG: 2 INJECTION INTRAMUSCULAR; INTRAVENOUS at 08:44

## 2024-06-11 RX ADMIN — ASPIRIN 81 MG: 81 TABLET, COATED ORAL at 08:34

## 2024-06-11 RX ADMIN — MIDODRINE HYDROCHLORIDE 2.5 MG: 2.5 TABLET ORAL at 08:35

## 2024-06-11 RX ADMIN — DOXYCYCLINE 100 MG: 100 CAPSULE ORAL at 06:30

## 2024-06-11 RX ADMIN — APIXABAN 5 MG: 5 TABLET, FILM COATED ORAL at 08:34

## 2024-06-11 RX ADMIN — POTASSIUM CHLORIDE 20 MEQ: 1500 TABLET, EXTENDED RELEASE ORAL at 08:34

## 2024-06-11 RX ADMIN — SODIUM CHLORIDE, PRESERVATIVE FREE 10 ML: 5 INJECTION INTRAVENOUS at 08:35

## 2024-06-11 RX ADMIN — CYANOCOBALAMIN TAB 1000 MCG 1000 MCG: 1000 TAB at 08:33

## 2024-06-11 RX ADMIN — FERROUS SULFATE TAB 325 MG (65 MG ELEMENTAL FE) 325 MG: 325 (65 FE) TAB at 08:35

## 2024-06-11 RX ADMIN — PANTOPRAZOLE SODIUM 40 MG: 40 TABLET, DELAYED RELEASE ORAL at 06:01

## 2024-06-11 RX ADMIN — LEVOTHYROXINE SODIUM 50 MCG: 0.05 TABLET ORAL at 06:00

## 2024-06-11 RX ADMIN — Medication 400 MG: at 08:33

## 2024-06-11 ASSESSMENT — PAIN SCALES - GENERAL: PAINLEVEL_OUTOF10: 0

## 2024-06-11 ASSESSMENT — PAIN SCALES - WONG BAKER: WONGBAKER_NUMERICALRESPONSE: NO HURT

## 2024-06-11 NOTE — PLAN OF CARE
Problem: Discharge Planning  Goal: Discharge to home or other facility with appropriate resources  6/11/2024 0946 by Teresa Manzo RN  Outcome: Progressing  6/11/2024 0655 by Sheri Duncan RN  Outcome: Progressing     Problem: Pain  Goal: Verbalizes/displays adequate comfort level or baseline comfort level  6/11/2024 0946 by Teresa Manzo RN  Outcome: Progressing  6/11/2024 0655 by Sheri Duncan RN  Outcome: Progressing     Problem: Skin/Tissue Integrity  Goal: Absence of new skin breakdown  Description: 1.  Monitor for areas of redness and/or skin breakdown  2.  Assess vascular access sites hourly  3.  Every 4-6 hours minimum:  Change oxygen saturation probe site  4.  Every 4-6 hours:  If on nasal continuous positive airway pressure, respiratory therapy assess nares and determine need for appliance change or resting period.  6/11/2024 0946 by Teresa Manzo RN  Outcome: Progressing  6/11/2024 0655 by Sheri Duncan RN  Outcome: Progressing     Problem: Safety - Adult  Goal: Free from fall injury  6/11/2024 0946 by Teresa Manzo RN  Outcome: Progressing  6/11/2024 0655 by Sheri Duncan RN  Outcome: Progressing     Problem: ABCDS Injury Assessment  Goal: Absence of physical injury  6/11/2024 0946 by Teresa Manzo RN  Outcome: Progressing  6/11/2024 0655 by Sheri Duncan RN  Outcome: Progressing     Problem: Chronic Conditions and Co-morbidities  Goal: Patient's chronic conditions and co-morbidity symptoms are monitored and maintained or improved  6/11/2024 0946 by Teresa Manzo RN  Outcome: Progressing  6/11/2024 0655 by Sheri Duncan RN  Outcome: Progressing

## 2024-06-11 NOTE — PROGRESS NOTES
TRIP spoke with POS, daughter Any Villareal [phone] regarding DC plan. Facility in [place] has accepted patient, however, daughter needing to transport and is 5 hours again. Patient to return to [facility] until daughter can make travel arrangements. Transport scheduled for 1400 today to [facility]. TRIP to update [place] facility regarding plan and fax Adult [facility] FL2 Form when signed by MD. DOMINIQUE to follow.

## 2024-06-11 NOTE — PROGRESS NOTES
Attempted to call report to [facility] and rehab at [phone].     [phone] Spoke with Ronda at [facility]. Verbalized an understanding. No further questions. Call light in reach.

## 2024-06-11 NOTE — DISCHARGE SUMMARY
Discharge Summary    Patient: Tiesha Harris MRN: [id number]  CSN: [id number]    YOB: 1940  Age: 83 y.o.  Sex: female    DOA: 6/6/2024 LOS:  LOS: 5 days   Discharge Date:      Primary Care Provider:  Koby Loco MD    Admission Diagnoses: Hypoxia [R09.02]  Pneumonia of right upper lobe due to infectious organism [J18.9]    Discharge Diagnoses:    Active Hospital Problems    Diagnosis     Hypoxia [R09.02]     Cardiomyopathy (HCC) [I42.9]     Chronic atrial fibrillation (HCC) [I48.20]     Dementia (HCC) [F03.90]     Pulmonary hypertension (HCC) [I27.20]     Pneumonia [J18.9]        Discharge Medications:        Medication List        START taking these medications      doxycycline monohydrate 100 MG capsule  Commonly known as: MONODOX  Take 1 capsule by mouth in the morning and 1 capsule in the evening. Do all this for 5 days.     midodrine 2.5 MG tablet  Commonly known as: PROAMATINE  Take 1 tablet by mouth 3 times daily (with meals)            CONTINUE taking these medications      acetaminophen 325 MG tablet  Commonly known as: TYLENOL     albuterol sulfate  (90 Base) MCG/ACT inhaler  Commonly known as: PROVENTIL;VENTOLIN;PROAIR     amiodarone 200 MG tablet  Commonly known as: CORDARONE  Take 1 tablet by mouth daily     Aspirin 81 MG Caps     Cholecalciferol 10 MCG (400 UNIT) Caps Capsule  Commonly known as: Vitamin D     [employer] VITAMIN B12 1000 MCG tablet  Generic drug: cyanocobalamin     Eliquis 5 MG Tabs tablet  Generic drug: apixaban     ferrous sulfate 325 (65 Fe) MG tablet  Commonly known as: IRON 325     furosemide 20 MG tablet  Commonly known as: LASIX     Levothyroxine Sodium 50 MCG Caps     magnesium oxide 400 MG tablet  Commonly known as: MAG-OX     melatonin 5 MG Tbdp disintegrating tablet  Take 1 tablet by mouth nightly     METAMUCIL FIBER PO     metoprolol 100 MG tablet  Commonly known as: LOPRESSOR  Take 1 tablet by mouth 2 times daily     omeprazole 20 MG delayed release

## 2024-06-11 NOTE — CARE COORDINATION
06/11/24 0900   IMM Letter   IMM Letter given to Patient/Family/Significant other/Guardian/POA/by: Mackenzie Huerta CMS   IMM Letter date given: 06/11/24   IMM Letter time given: 0900

## 2024-06-11 NOTE — CARE COORDINATION
Transition of Care Plan to SNF/Rehab      Patient Name: Tiesha Harris                   YOB: 1940    SNF/Rehab Transition:  Patient has been accepted to [facility] SNF/Rehab and meets criteria for admission.   Patient will transported by medical transport and expected to leave at 2pm.    Medicaid Long-term Services and Supports(LTSS) screening completion: No    Three Inpatient Midnights for Medicare: Yes    Current Code Status:   Code Status: DNR     Last Weight:   Wt Readings from Last 1 Encounters:   06/06/24 85.3 kg (188 lb)       Weightbearing Status: N/A    IV Medication, IV Site, Device Type: No    Dialysis: No      O2 Needs (including O2, Bipap, Cpap, ect.): No    Covid Vaccine Dates/ if known:    Internal Administration   First Dose      Second Dose           Last COVID Lab SARS-CoV-2, PCR ( )   Date Value   11/25/2023 Not detected     POC Corky's Test ( )   Date Value   11/25/2023 Positive     FIO2 (%)   Date Value   11/25/2023 60     POC Glucose (mg/dL)   Date Value   06/08/2024 129 (H)     POC HCO3 (MMOL/L)   Date Value   11/25/2023 30.1 (H)     POC O2 SAT (%)   Date Value   11/25/2023 96.1     POC pCO2 (MMHG)   Date Value   11/25/2023 41.5     POC PEEP (cmH2O)   Date Value   11/25/2023 8     POC pH ( )   Date Value   11/25/2023 7.47 (H)     POC PO2 (MMHG)   Date Value   11/25/2023 78 (L)     POC Pressure Support (cmH2O)   Date Value   11/25/2023 14     Rapid Influenza A By PCR ( )   Date Value   11/25/2023 Not detected     Rapid Influenza B By PCR ( )   Date Value   11/25/2023 Not detected            Current Diet: ADULT DIET; Dysphagia - Soft and Bite Sized; Low Sodium (2 gm)    Wound Vac or Other Equipment Needs: N/A    Patient requires Isolation: No    Follow-up Appointment needed or scheduled: No    Communication to SNF/Rehab:  Bedside RN has been notified to update the transition plan to the facility and call report [phone].  Discharge information has been updated

## 2024-06-21 NOTE — PROGRESS NOTES
Physician Progress Note      PATIENT:               SILAS JOHNSON  [facility] #:                  [id number]  :                       1940  ADMIT DATE:       2024 6:03 PM  DISCH DATE:        2024 2:32 PM  RESPONDING  PROVIDER #:        Robert Calixto MD          QUERY TEXT:    Good Morning    This patient admitted on 2024- 2024 for Aspiration Pneumonia.    The medical record also notes a diagnosis of acute on Chronic hypoxic   Respiratory Failure.  Per medical record the patient is normally on 3 liters o2 @ home.  The patient arrived to ER on 3 liters, and was maintained on 3 liters or less   o2 this admission.    In order to support the diagnosis of acute respiratory failure, please include   additional clinical indicators in your documentation.  Or please document if the diagnosis of acute respiratory failure has been   ruled out after further study.    The medical record reflects the following:  Risk Factors: CHF, Cardiomyopathy, Pneumonia, Atrial Fibrillation  Clinical Indicators: To ER for SOB, o2 sats 95% on 3 liters o2. Pt is normally   on 2-3 liters o2 at home. CT chest with Pulmonary interstitial edema and   scattered consolidative and groundless opacities scattered in the right lung,   likely representing alveola edema though difficult to exclude infection- Resp   rate was noted upon arrival to ER 28-30- Hr 101-120  Treatment: IV Cefepime and IV doxycycline, Duonebs, o2, Speech consulted    Thank you  Rosemary Delvalle, BSN,RN, CPHQ, CCDS, SMART  __________________________________________________________________    Acute Respiratory Failure Clinical Indicators per  MS-DRG Training Guide and   Quick Reference Guide:  pO2 < 60 mmHg or SpO2 (pulse oximetry) < 91% breathing room air  pCO2 > 50 and pH < 7.35  P/F ratio (pO2 / FIO2) < 300  pO2 decrease or pCO2 increase by 10 mmHg from baseline (if known)  Supplemental oxygen of 40% or more  Presence of respiratory distress  Unable

## 2024-06-23 NOTE — PROGRESS NOTES
Physician Progress Note      PATIENT:               SILAS JOHNSON  [facility] #:                  [id number]  :                       1940  ADMIT DATE:       2024 6:03 PM  DISCH DATE:        2024 2:32 PM  RESPONDING  PROVIDER #:        Ricco Perez MD          QUERY TEXT:    Good Morning    This patient admitted on 2024-2024 for Aspiration Pneumonia.    If possible, please document in the progress notes and discharge summary if   you are evaluating and /or treating any of the following:    The medical record reflects the following:  Risk Factors: Aspiration pneumonia  Clinical Indicators: Upon arrival to ER WBC 12.9, -120- resp 22-30-   progress notes on 06/10- Aspiration Pneumonia, right upper lobe infiltrate,   generalized weakness and debility  Treatment: IV Cefepime, IV doxycycline, No blood cultures or Lactic acid   assessed    Thank you  Rosemary Delvalle, BSN,RN, CPHQ, CCDS, SMART  Options provided:  -- Sepsis was ruled out. The patient has SIRS of non-infectious process due to   an inflammatory response from the Aspiration Pneumonia.  -- Sepsis, present on admission due to Aspiration Pneumonia  -- The patient has localized infection from Aspiration Pneumonia  without   Sepsis  -- Other - I will add my own diagnosis  -- Disagree - Not applicable / Not valid  -- Disagree - Clinically unable to determine / Unknown  -- Refer to Clinical Documentation Reviewer    PROVIDER RESPONSE TEXT:    The patient has localized infection from Aspiration Pneumonia without Sepsis.    Query created by: Rosemary Delvalle on 2024 6:48 AM      Electronically signed by:  Ricco Perez MD 2024 6:31 PM

## 2024-07-02 ENCOUNTER — APPOINTMENT (OUTPATIENT)
Facility: HOSPITAL | Age: 84
End: 2024-07-02
Payer: MEDICARE

## 2024-07-02 ENCOUNTER — HOSPITAL ENCOUNTER (INPATIENT)
Facility: HOSPITAL | Age: 84
LOS: 3 days | Discharge: NURSING, REHAB OR LONG-TERM CARE | End: 2024-07-05
Attending: EMERGENCY MEDICINE | Admitting: HOSPITALIST
Payer: MEDICARE

## 2024-07-02 DIAGNOSIS — R04.0 EPISTAXIS: Primary | ICD-10-CM

## 2024-07-02 DIAGNOSIS — K92.1 MELENA: ICD-10-CM

## 2024-07-02 DIAGNOSIS — I50.9 ACUTE ON CHRONIC CONGESTIVE HEART FAILURE, UNSPECIFIED HEART FAILURE TYPE (HCC): ICD-10-CM

## 2024-07-02 PROBLEM — K92.2 GI BLEED: Status: ACTIVE | Noted: 2024-07-02

## 2024-07-02 LAB
ABO + RH BLD: NORMAL
ALBUMIN SERPL-MCNC: 2.8 G/DL (ref 3.4–5)
ALBUMIN/GLOB SERPL: 0.9 (ref 0.8–1.7)
ALP SERPL-CCNC: 155 U/L (ref 45–117)
ALT SERPL-CCNC: 18 U/L (ref 13–56)
ANION GAP SERPL CALC-SCNC: 5 MMOL/L (ref 3–18)
AST SERPL-CCNC: 18 U/L (ref 10–38)
BASOPHILS # BLD: 0 K/UL (ref 0–0.1)
BASOPHILS NFR BLD: 1 % (ref 0–2)
BILIRUB SERPL-MCNC: 1.1 MG/DL (ref 0.2–1)
BLOOD GROUP ANTIBODIES SERPL: NORMAL
BUN SERPL-MCNC: 21 MG/DL (ref 7–18)
BUN/CREAT SERPL: 24 (ref 12–20)
CALCIUM SERPL-MCNC: 8.9 MG/DL (ref 8.5–10.1)
CHLORIDE SERPL-SCNC: 105 MMOL/L (ref 100–111)
CO2 SERPL-SCNC: 31 MMOL/L (ref 21–32)
CREAT SERPL-MCNC: 0.87 MG/DL (ref 0.6–1.3)
DIFFERENTIAL METHOD BLD: ABNORMAL
EKG ATRIAL RATE: 102 BPM
EKG DIAGNOSIS: NORMAL
EKG Q-T INTERVAL: 388 MS
EKG QRS DURATION: 102 MS
EKG QTC CALCULATION (BAZETT): 495 MS
EKG R AXIS: -42 DEGREES
EKG T AXIS: 6 DEGREES
EKG VENTRICULAR RATE: 98 BPM
EOSINOPHIL # BLD: 0.4 K/UL (ref 0–0.4)
EOSINOPHIL NFR BLD: 4 % (ref 0–5)
ERYTHROCYTE [DISTWIDTH] IN BLOOD BY AUTOMATED COUNT: 14.6 % (ref 11.6–14.5)
GLOBULIN SER CALC-MCNC: 3.2 G/DL (ref 2–4)
GLUCOSE SERPL-MCNC: 119 MG/DL (ref 74–99)
HCT VFR BLD AUTO: 36.8 % (ref 35–45)
HGB BLD-MCNC: 11.5 G/DL (ref 12–16)
IMM GRANULOCYTES # BLD AUTO: 0 K/UL (ref 0–0.04)
IMM GRANULOCYTES NFR BLD AUTO: 0 % (ref 0–0.5)
LYMPHOCYTES # BLD: 0.8 K/UL (ref 0.9–3.6)
LYMPHOCYTES NFR BLD: 9 % (ref 21–52)
MAGNESIUM SERPL-MCNC: 1.9 MG/DL (ref 1.6–2.6)
MCH RBC QN AUTO: 29.6 PG (ref 24–34)
MCHC RBC AUTO-ENTMCNC: 31.3 G/DL (ref 31–37)
MCV RBC AUTO: 94.8 FL (ref 78–100)
MONOCYTES # BLD: 0.7 K/UL (ref 0.05–1.2)
MONOCYTES NFR BLD: 9 % (ref 3–10)
NEUTS SEG # BLD: 6.6 K/UL (ref 1.8–8)
NEUTS SEG NFR BLD: 77 % (ref 40–73)
NRBC # BLD: 0 K/UL (ref 0–0.01)
NRBC BLD-RTO: 0 PER 100 WBC
NT PRO BNP: 4233 PG/ML (ref 0–1800)
PLATELET # BLD AUTO: 277 K/UL (ref 135–420)
PMV BLD AUTO: 10.6 FL (ref 9.2–11.8)
POTASSIUM SERPL-SCNC: 3.8 MMOL/L (ref 3.5–5.5)
PROT SERPL-MCNC: 6 G/DL (ref 6.4–8.2)
RBC # BLD AUTO: 3.88 M/UL (ref 4.2–5.3)
SODIUM SERPL-SCNC: 141 MMOL/L (ref 136–145)
SPECIMEN EXP DATE BLD: NORMAL
TROPONIN I SERPL HS-MCNC: 14 NG/L (ref 0–54)
WBC # BLD AUTO: 8.5 K/UL (ref 4.6–13.2)

## 2024-07-02 PROCEDURE — 93005 ELECTROCARDIOGRAM TRACING: CPT | Performed by: EMERGENCY MEDICINE

## 2024-07-02 PROCEDURE — 85018 HEMOGLOBIN: CPT

## 2024-07-02 PROCEDURE — 74178 CT ABD&PLV WO CNTR FLWD CNTR: CPT

## 2024-07-02 PROCEDURE — 1100000003 HC PRIVATE W/ TELEMETRY

## 2024-07-02 PROCEDURE — 6360000002 HC RX W HCPCS: Performed by: EMERGENCY MEDICINE

## 2024-07-02 PROCEDURE — 83880 ASSAY OF NATRIURETIC PEPTIDE: CPT

## 2024-07-02 PROCEDURE — 6360000002 HC RX W HCPCS: Performed by: HOSPITALIST

## 2024-07-02 PROCEDURE — 2580000003 HC RX 258: Performed by: EMERGENCY MEDICINE

## 2024-07-02 PROCEDURE — 96375 TX/PRO/DX INJ NEW DRUG ADDON: CPT

## 2024-07-02 PROCEDURE — 86900 BLOOD TYPING SEROLOGIC ABO: CPT

## 2024-07-02 PROCEDURE — 83735 ASSAY OF MAGNESIUM: CPT

## 2024-07-02 PROCEDURE — 2580000003 HC RX 258: Performed by: HOSPITALIST

## 2024-07-02 PROCEDURE — 86850 RBC ANTIBODY SCREEN: CPT

## 2024-07-02 PROCEDURE — 80053 COMPREHEN METABOLIC PANEL: CPT

## 2024-07-02 PROCEDURE — 2700000000 HC OXYGEN THERAPY PER DAY

## 2024-07-02 PROCEDURE — 6360000004 HC RX CONTRAST MEDICATION: Performed by: EMERGENCY MEDICINE

## 2024-07-02 PROCEDURE — 74177 CT ABD & PELVIS W/CONTRAST: CPT

## 2024-07-02 PROCEDURE — 96374 THER/PROPH/DIAG INJ IV PUSH: CPT

## 2024-07-02 PROCEDURE — 86901 BLOOD TYPING SEROLOGIC RH(D): CPT

## 2024-07-02 PROCEDURE — P9047 ALBUMIN (HUMAN), 25%, 50ML: HCPCS | Performed by: HOSPITALIST

## 2024-07-02 PROCEDURE — 99285 EMERGENCY DEPT VISIT HI MDM: CPT

## 2024-07-02 PROCEDURE — 6370000000 HC RX 637 (ALT 250 FOR IP): Performed by: EMERGENCY MEDICINE

## 2024-07-02 PROCEDURE — 93010 ELECTROCARDIOGRAM REPORT: CPT | Performed by: INTERNAL MEDICINE

## 2024-07-02 PROCEDURE — 71045 X-RAY EXAM CHEST 1 VIEW: CPT

## 2024-07-02 PROCEDURE — 85014 HEMATOCRIT: CPT

## 2024-07-02 PROCEDURE — 6370000000 HC RX 637 (ALT 250 FOR IP): Performed by: HOSPITALIST

## 2024-07-02 PROCEDURE — 96365 THER/PROPH/DIAG IV INF INIT: CPT

## 2024-07-02 PROCEDURE — 84484 ASSAY OF TROPONIN QUANT: CPT

## 2024-07-02 PROCEDURE — 85025 COMPLETE CBC W/AUTO DIFF WBC: CPT

## 2024-07-02 RX ORDER — AMIODARONE HYDROCHLORIDE 200 MG/1
200 TABLET ORAL DAILY
Status: DISCONTINUED | OUTPATIENT
Start: 2024-07-02 | End: 2024-07-05 | Stop reason: HOSPADM

## 2024-07-02 RX ORDER — METOPROLOL TARTRATE 50 MG/1
100 TABLET, FILM COATED ORAL 2 TIMES DAILY
Status: DISCONTINUED | OUTPATIENT
Start: 2024-07-02 | End: 2024-07-04

## 2024-07-02 RX ORDER — FUROSEMIDE 20 MG/1
20 TABLET ORAL 2 TIMES DAILY
Status: DISCONTINUED | OUTPATIENT
Start: 2024-07-03 | End: 2024-07-05 | Stop reason: HOSPADM

## 2024-07-02 RX ORDER — LANOLIN ALCOHOL/MO/W.PET/CERES
400 CREAM (GRAM) TOPICAL DAILY
Status: DISCONTINUED | OUTPATIENT
Start: 2024-07-02 | End: 2024-07-05 | Stop reason: HOSPADM

## 2024-07-02 RX ORDER — LANOLIN ALCOHOL/MO/W.PET/CERES
1000 CREAM (GRAM) TOPICAL DAILY
Status: DISCONTINUED | OUTPATIENT
Start: 2024-07-02 | End: 2024-07-05 | Stop reason: HOSPADM

## 2024-07-02 RX ORDER — ACETAMINOPHEN 325 MG/1
650 TABLET ORAL EVERY 4 HOURS PRN
Status: DISCONTINUED | OUTPATIENT
Start: 2024-07-02 | End: 2024-07-05 | Stop reason: HOSPADM

## 2024-07-02 RX ORDER — FUROSEMIDE 20 MG/1
20 TABLET ORAL 2 TIMES DAILY
Status: DISCONTINUED | OUTPATIENT
Start: 2024-07-02 | End: 2024-07-02

## 2024-07-02 RX ORDER — FUROSEMIDE 10 MG/ML
40 INJECTION INTRAMUSCULAR; INTRAVENOUS ONCE
Status: COMPLETED | OUTPATIENT
Start: 2024-07-02 | End: 2024-07-02

## 2024-07-02 RX ORDER — OXYMETAZOLINE HYDROCHLORIDE 0.05 G/100ML
2 SPRAY NASAL
Status: COMPLETED | OUTPATIENT
Start: 2024-07-02 | End: 2024-07-02

## 2024-07-02 RX ORDER — LEVOTHYROXINE SODIUM 0.05 MG/1
50 TABLET ORAL DAILY
Status: DISCONTINUED | OUTPATIENT
Start: 2024-07-03 | End: 2024-07-05 | Stop reason: HOSPADM

## 2024-07-02 RX ORDER — POTASSIUM CHLORIDE 20 MEQ/1
20 TABLET, EXTENDED RELEASE ORAL DAILY
Status: DISCONTINUED | OUTPATIENT
Start: 2024-07-02 | End: 2024-07-05 | Stop reason: HOSPADM

## 2024-07-02 RX ORDER — MIDODRINE HYDROCHLORIDE 2.5 MG/1
2.5 TABLET ORAL
Status: DISCONTINUED | OUTPATIENT
Start: 2024-07-02 | End: 2024-07-05 | Stop reason: HOSPADM

## 2024-07-02 RX ORDER — FERROUS SULFATE 325(65) MG
325 TABLET ORAL DAILY
Status: DISCONTINUED | OUTPATIENT
Start: 2024-07-02 | End: 2024-07-05 | Stop reason: HOSPADM

## 2024-07-02 RX ORDER — ONDANSETRON 2 MG/ML
4 INJECTION INTRAMUSCULAR; INTRAVENOUS ONCE
Status: COMPLETED | OUTPATIENT
Start: 2024-07-02 | End: 2024-07-02

## 2024-07-02 RX ORDER — ALBUMIN (HUMAN) 12.5 G/50ML
25 SOLUTION INTRAVENOUS ONCE
Status: COMPLETED | OUTPATIENT
Start: 2024-07-02 | End: 2024-07-02

## 2024-07-02 RX ADMIN — PANTOPRAZOLE SODIUM 40 MG: 40 INJECTION, POWDER, FOR SOLUTION INTRAVENOUS at 13:12

## 2024-07-02 RX ADMIN — SERTRALINE 50 MG: 50 TABLET, FILM COATED ORAL at 19:58

## 2024-07-02 RX ADMIN — MIDODRINE HYDROCHLORIDE 2.5 MG: 2.5 TABLET ORAL at 19:58

## 2024-07-02 RX ADMIN — ALBUMIN (HUMAN) 25 G: 0.25 INJECTION, SOLUTION INTRAVENOUS at 19:56

## 2024-07-02 RX ADMIN — METOPROLOL TARTRATE 100 MG: 50 TABLET, FILM COATED ORAL at 19:58

## 2024-07-02 RX ADMIN — ONDANSETRON 4 MG: 2 INJECTION INTRAMUSCULAR; INTRAVENOUS at 14:32

## 2024-07-02 RX ADMIN — IOPAMIDOL 100 ML: 755 INJECTION, SOLUTION INTRAVENOUS at 14:16

## 2024-07-02 RX ADMIN — PANTOPRAZOLE SODIUM 40 MG: 40 INJECTION, POWDER, FOR SOLUTION INTRAVENOUS at 17:00

## 2024-07-02 RX ADMIN — AMIODARONE HYDROCHLORIDE 200 MG: 200 TABLET ORAL at 19:58

## 2024-07-02 RX ADMIN — OXYMETHAZOLINE HCL 2 SPRAY: 0.05 SPRAY NASAL at 11:22

## 2024-07-02 RX ADMIN — Medication 400 MG: at 19:58

## 2024-07-02 RX ADMIN — FUROSEMIDE 40 MG: 10 INJECTION, SOLUTION INTRAMUSCULAR; INTRAVENOUS at 13:13

## 2024-07-02 NOTE — ED NOTES
Patient taken to inpatient floor, patient going to room 337.     Report called with no further questions.     Outstanding orders reviewed and discussed with the receiving nurse (if applicable).     Will remove from ED trackboard.

## 2024-07-02 NOTE — ED NOTES
Patient's nose is bleeding more than before per previous nurse. AFRIN requested and ordered by MD.     Medication given per MAR order. Education regarding medication provided.    Tolerated well with no complaints.     Instructed patient to utilize the call light if he/she needs anything further.

## 2024-07-02 NOTE — ED PROVIDER NOTES
[facility] EMERGENCY DEPT  EMERGENCY DEPARTMENT ENCOUNTER    Patient Name: Tiesha Harris  MRN: [id number]  YOB: 1940  Provider: William Walton MD  PCP: Koby Loco MD   Time/Date of evaluation: 10:43 AM EDT on 7/2/24    History of Presenting Illness     History Provided by: Patient  History is limited by: Nothing     HISTORY:   Tiesha Harris is a 83 y.o. female presenting with epistaxis.  She has a history of A-fib currently on Eliquis, CHF/pulmonary hypertension, hypertension.  She is on 2 L nasal cannula as needed.  She presents with nontraumatic epistaxis from the left nostril.  She is having some mild shortness of breath no chest pain she presents of her rehab facility.  Denies any other complaints at this time although history is somewhat limited.    Nursing Notes were all reviewed and agreed with or any disagreements were addressed in the HPI.    Past History     PAST MEDICAL HISTORY:  Past Medical History:   Diagnosis Date    Atrial fibrillation (HCC)     Hypothyroidism        PAST SURGICAL HISTORY:  Past Surgical History:   Procedure Laterality Date    HYSTERECTOMY      TOTAL KNEE ARTHROPLASTY         FAMILY HISTORY:  No family history on file.    SOCIAL HISTORY:  Social History     Tobacco Use    Smoking status: Never    Smokeless tobacco: Never   Substance Use Topics    Alcohol use: Not Currently    Drug use: Never       MEDICATIONS:  No current facility-administered medications for this encounter.     Current Outpatient Medications   Medication Sig Dispense Refill    midodrine (PROAMATINE) 2.5 MG tablet Take 1 tablet by mouth 3 times daily (with meals) 90 tablet 3    metoprolol tartrate (LOPRESSOR) 100 MG tablet Take 1 tablet by mouth 2 times daily 60 tablet 3    amiodarone (CORDARONE) 200 MG tablet Take 1 tablet by mouth daily 30 tablet 0    melatonin 5 MG TBDP disintegrating tablet Take 1 tablet by mouth nightly 10 tablet 0    acetaminophen (TYLENOL) 325 MG tablet Take 2 tablets by

## 2024-07-02 NOTE — ED TRIAGE NOTES
[facility]  EM reports that pt had low O2 sats and placed on O2 by EMS.

## 2024-07-02 NOTE — ED NOTES
Medication given per MAR order. Patient's nose is not bleeding at this time.    Patient resting on the stretcher at this time.     Chest rise and fall noted. VSS.     No further complaints at this time.     Patient instructed to utilize the \"call bell\" if any assistance is needed.

## 2024-07-02 NOTE — ED NOTES
Attempted to call report to floor nurse, advised give them 5 minutes and they will give ED a call back.

## 2024-07-03 LAB
ANION GAP SERPL CALC-SCNC: 8 MMOL/L (ref 3–18)
BASOPHILS # BLD: 0 K/UL (ref 0–0.1)
BASOPHILS NFR BLD: 1 % (ref 0–2)
BUN SERPL-MCNC: 21 MG/DL (ref 7–18)
BUN/CREAT SERPL: 26 (ref 12–20)
CALCIUM SERPL-MCNC: 8.7 MG/DL (ref 8.5–10.1)
CHLORIDE SERPL-SCNC: 105 MMOL/L (ref 100–111)
CO2 SERPL-SCNC: 30 MMOL/L (ref 21–32)
CREAT SERPL-MCNC: 0.8 MG/DL (ref 0.6–1.3)
DIFFERENTIAL METHOD BLD: ABNORMAL
EOSINOPHIL # BLD: 0.3 K/UL (ref 0–0.4)
EOSINOPHIL NFR BLD: 4 % (ref 0–5)
ERYTHROCYTE [DISTWIDTH] IN BLOOD BY AUTOMATED COUNT: 14.3 % (ref 11.6–14.5)
GLUCOSE SERPL-MCNC: 89 MG/DL (ref 74–99)
HCT VFR BLD AUTO: 34.2 % (ref 35–45)
HCT VFR BLD AUTO: 35 % (ref 35–45)
HCT VFR BLD AUTO: 35.2 % (ref 35–45)
HCT VFR BLD AUTO: 36.3 % (ref 35–45)
HGB BLD-MCNC: 10.5 G/DL (ref 12–16)
HGB BLD-MCNC: 10.6 G/DL (ref 12–16)
HGB BLD-MCNC: 10.9 G/DL (ref 12–16)
HGB BLD-MCNC: 11.1 G/DL (ref 12–16)
IMM GRANULOCYTES # BLD AUTO: 0 K/UL (ref 0–0.04)
IMM GRANULOCYTES NFR BLD AUTO: 0 % (ref 0–0.5)
LYMPHOCYTES # BLD: 0.9 K/UL (ref 0.9–3.6)
LYMPHOCYTES NFR BLD: 15 % (ref 21–52)
MCH RBC QN AUTO: 29.7 PG (ref 24–34)
MCHC RBC AUTO-ENTMCNC: 30 G/DL (ref 31–37)
MCV RBC AUTO: 98.9 FL (ref 78–100)
MONOCYTES # BLD: 0.5 K/UL (ref 0.05–1.2)
MONOCYTES NFR BLD: 8 % (ref 3–10)
NEUTS SEG # BLD: 4.3 K/UL (ref 1.8–8)
NEUTS SEG NFR BLD: 72 % (ref 40–73)
NRBC # BLD: 0 K/UL (ref 0–0.01)
NRBC BLD-RTO: 0 PER 100 WBC
PLATELET # BLD AUTO: 200 K/UL (ref 135–420)
PMV BLD AUTO: 11 FL (ref 9.2–11.8)
POTASSIUM SERPL-SCNC: 3.6 MMOL/L (ref 3.5–5.5)
RBC # BLD AUTO: 3.54 M/UL (ref 4.2–5.3)
SODIUM SERPL-SCNC: 143 MMOL/L (ref 136–145)
WBC # BLD AUTO: 5.9 K/UL (ref 4.6–13.2)

## 2024-07-03 PROCEDURE — 80048 BASIC METABOLIC PNL TOTAL CA: CPT

## 2024-07-03 PROCEDURE — 36415 COLL VENOUS BLD VENIPUNCTURE: CPT

## 2024-07-03 PROCEDURE — 6370000000 HC RX 637 (ALT 250 FOR IP): Performed by: HOSPITALIST

## 2024-07-03 PROCEDURE — 85025 COMPLETE CBC W/AUTO DIFF WBC: CPT

## 2024-07-03 PROCEDURE — 6370000000 HC RX 637 (ALT 250 FOR IP): Performed by: INTERNAL MEDICINE

## 2024-07-03 PROCEDURE — 1100000003 HC PRIVATE W/ TELEMETRY

## 2024-07-03 PROCEDURE — 2580000003 HC RX 258: Performed by: HOSPITALIST

## 2024-07-03 PROCEDURE — 6360000002 HC RX W HCPCS: Performed by: HOSPITALIST

## 2024-07-03 PROCEDURE — 2700000000 HC OXYGEN THERAPY PER DAY

## 2024-07-03 RX ORDER — MECOBALAMIN 5000 MCG
5 TABLET,DISINTEGRATING ORAL NIGHTLY
Status: DISCONTINUED | OUTPATIENT
Start: 2024-07-03 | End: 2024-07-05 | Stop reason: HOSPADM

## 2024-07-03 RX ADMIN — MIDODRINE HYDROCHLORIDE 2.5 MG: 2.5 TABLET ORAL at 12:33

## 2024-07-03 RX ADMIN — FUROSEMIDE 20 MG: 20 TABLET ORAL at 17:00

## 2024-07-03 RX ADMIN — CYANOCOBALAMIN TAB 1000 MCG 1000 MCG: 1000 TAB at 08:54

## 2024-07-03 RX ADMIN — PANTOPRAZOLE SODIUM 40 MG: 40 INJECTION, POWDER, FOR SOLUTION INTRAVENOUS at 05:55

## 2024-07-03 RX ADMIN — POTASSIUM CHLORIDE 20 MEQ: 1500 TABLET, EXTENDED RELEASE ORAL at 08:52

## 2024-07-03 RX ADMIN — FUROSEMIDE 20 MG: 20 TABLET ORAL at 08:52

## 2024-07-03 RX ADMIN — Medication 400 MG: at 21:05

## 2024-07-03 RX ADMIN — LEVOTHYROXINE SODIUM 50 MCG: 0.05 TABLET ORAL at 08:54

## 2024-07-03 RX ADMIN — CHOLECALCIFEROL (VITAMIN D3) 10 MCG (400 UNIT) TABLET 400 UNITS: at 08:54

## 2024-07-03 RX ADMIN — SERTRALINE 50 MG: 50 TABLET, FILM COATED ORAL at 08:53

## 2024-07-03 RX ADMIN — METOPROLOL TARTRATE 100 MG: 50 TABLET, FILM COATED ORAL at 08:53

## 2024-07-03 RX ADMIN — FERROUS SULFATE TAB 325 MG (65 MG ELEMENTAL FE) 325 MG: 325 (65 FE) TAB at 08:52

## 2024-07-03 RX ADMIN — MIDODRINE HYDROCHLORIDE 2.5 MG: 2.5 TABLET ORAL at 17:00

## 2024-07-03 RX ADMIN — AMIODARONE HYDROCHLORIDE 200 MG: 200 TABLET ORAL at 08:53

## 2024-07-03 RX ADMIN — Medication 5 MG: at 22:32

## 2024-07-03 RX ADMIN — PANTOPRAZOLE SODIUM 40 MG: 40 INJECTION, POWDER, FOR SOLUTION INTRAVENOUS at 17:00

## 2024-07-03 ASSESSMENT — PAIN SCALES - GENERAL
PAINLEVEL_OUTOF10: 0
PAINLEVEL_OUTOF10: 0

## 2024-07-03 NOTE — PLAN OF CARE
Problem: Discharge Planning  Goal: Discharge to home or other facility with appropriate resources  7/3/2024 0951 by Racheal Bryan, RN  Outcome: Progressing  Flowsheets (Taken 7/3/2024 0830)  Discharge to home or other facility with appropriate resources:   Identify barriers to discharge with patient and caregiver   Arrange for needed discharge resources and transportation as appropriate  7/2/2024 2243 by Radha Ignacio RN  Outcome: Progressing  Flowsheets (Taken 7/2/2024 1945)  Discharge to home or other facility with appropriate resources:   Identify barriers to discharge with patient and caregiver   Arrange for needed discharge resources and transportation as appropriate   Identify discharge learning needs (meds, wound care, etc)     Problem: Safety - Adult  Goal: Free from fall injury  7/3/2024 0951 by Racheal Bryan, RN  Outcome: Progressing  7/2/2024 2243 by Radha Ignacio RN  Outcome: Progressing     Problem: Skin/Tissue Integrity  Goal: Absence of new skin breakdown  Description: 1.  Monitor for areas of redness and/or skin breakdown  2.  Assess vascular access sites hourly  3.  Every 4-6 hours minimum:  Change oxygen saturation probe site  4.  Every 4-6 hours:  If on nasal continuous positive airway pressure, respiratory therapy assess nares and determine need for appliance change or resting period.  7/3/2024 0951 by Racheal Bryan, RN  Outcome: Progressing  7/2/2024 2243 by Radha Ignacio RN  Outcome: Progressing

## 2024-07-03 NOTE — CARE COORDINATION
Patient re-admitted from [facility]. Patient admitted for a bleeding episode and was consulted by GI. Patient has been taking all her meds as directed by nursing staff at [facility]. Patient to return to SNF when stable. Referral placed via CC link for [facility] to review.  to follow.    07/03/24 0911   Readmission Assessment   Number of Days since last admission? 8-30 days   Previous Disposition SNF   Who is being Interviewed Patient   What was the patient's/caregiver's perception as to why they think they needed to return back to the hospital? Other (Comment)  (new diagnosis)   Did you visit your Primary Care Physician after you left the hospital, before you returned this time? No   Why weren't you able to visit your PCP? Other (Comment)  (lives in snf)   Did you see a specialist, such as Cardiac, Pulmonary, Orthopedic Physician, etc. after you left the hospital? No   Who advised the patient to return to the hospital? Physician   Does the patient report anything that got in the way of taking their medications? No   In our efforts to provide the best possible care to you and others like you, can you think of anything that we could have done to help you after you left the hospital the first time, so that you might not have needed to return so soon? Other (Comment)  (new diagnosis for re-admission)

## 2024-07-03 NOTE — PROGRESS NOTES
Hospitalist Progress Note-critical care note     Patient: Tiesha Harris MRN: [id number]  CSN: [id number]    YOB: 1940  Age: 83 y.o.  Sex: female    DOA: 7/2/2024 LOS:  LOS: 1 day            Chief complaint: gi bleeding epistaxis  chronic a fib     Assessment/Plan         Active Hospital Problems    Diagnosis Date Noted    GI bleed [K92.2] 07/02/2024    Epistaxis [R04.0] 07/02/2024    Cardiomyopathy (HCC) [I42.9] 12/04/2023    Chronic atrial fibrillation (HCC) [I48.20] 11/25/2023    Dementia (HCC) [F03.90] 11/25/2023    Pulmonary hypertension (HCC) [I27.20] 11/25/2023          . Epistaxis.like stopped will continue monitoring     GI bleeding.  No rebleeding reported and waiting for gi input ct abdomen negative for bleeding , continue h/h monitoring  Hb stable      Cardiomyopathy and pulm hypertension   On oxygen     Dementia.  Fall and aspiration precaution   Will have speech evaluation     Chronic atrial fibrillation.  On metoprolol and amiodarone   Will continue     Chronic anticoagulation. Hold due to bleeding     Subjective I am ok , I do not know my stool     TIME: E/M Time spent with patient and patient care issues: [] 31-40 mins  [x] 41-49 mins  [] 50 mins or more.      Disposition :tbd,   Review of systems:    Limited due to dementia     Vital signs/Intake and Output:  Visit Vitals  /76   Pulse 80   Temp 98.3 °F (36.8 °C) (Temporal)   Resp 18   Wt 84.8 kg (187 lb)   SpO2 100%   BMI 29.29 kg/m²     Current Shift:  07/03 0701 - 07/03 1900  In: 240 [P.O.:240]  Out: -   Last three shifts:  07/01 1901 - 07/03 0700  In: -   Out: 600 [Urine:600]        Physical Exam:  General: WD, WN.  Alert, some cooperative, no acute distress    HEENT: NC, Atraumatic.  PERRLA, anicteric sclerae.  Lungs: CTA Bilaterally. No Wheezing/Rhonchi/Rales.  Heart:  Irregular irregular ,  No murmur, No Rubs, No Gallops  Abdomen: Soft, Non distended, Non tender.  +Bowel sounds,   Extremities: No c/c/e  Psych:   Not

## 2024-07-03 NOTE — CARE COORDINATION
Patient a re-admission. Patient lives at [facility], while family is attempting to make a transition to [place], closer to family. Referral placed for [facility] and TRIP reached out to liaison regarding admission. PT/OT to assess patient prior to DC. Patient remains on 02 at this time. SW to follow to assist with DC planning needs.    07/03/24 0904   Service Assessment   Patient Orientation Person   Cognition Short Term Memory Deficit   History Provided By Child/Family   Primary Caregiver Other (Comment)  (lives at [facility])   Support Systems Other (Comment)   Patient's Healthcare Decision Maker is: Named in Scanned ACP Document   PCP Verified by CM Yes   Last Visit to PCP Within last 3 months   Prior Functional Level Assistance with the following:   Current Functional Level Assistance with the following:   Can patient return to prior living arrangement Yes   Ability to make needs known: Poor   Family able to assist with home care needs: Yes   Would you like for me to discuss the discharge plan with any other family members/significant others, and if so, who? Yes   Financial Resources Other (Comment)   Community Resources None   CM/SW Referral Other (see comment)   Social/Functional History   Lives With Other (comment)   Type of Home   (snf)   Receives Help From Other (comment)   ADL Assistance Needs assistance   Ambulation Assistance Independent   Transfer Assistance Independent   Active  Yes   Occupation Student: Trade school;Other(comment)   Discharge Planning   Type of Residence Skilled Nursing Facility   Living Arrangements Spouse/Significant Other   Current Services Prior To Admission None   Potential Assistance Needed N/A   DME Ordered? Other (comment)   Potential Assistance Purchasing Medications No   Type of Home Care Services None   Patient expects to be discharged to: Skilled nursing facility   Services At/After Discharge   Transition of Care Consult (CM Consult) SNF   Partner SNF No   Reason Why Partner SNF

## 2024-07-03 NOTE — H&P
[address]  [address]                           HISTORY & PHYSICAL      PATIENT NAME: SILAS JOHNSON                : 1940  MED REC NO: [id number]                       ROOM: 337  ACCOUNT NO: [id number]                       ADMIT DATE: 2024  PROVIDER: Rhonda Landry MD    ADMISSION DIAGNOSES:    1. Epistaxis.  2. GI bleeding.  3. Dementia.  4. Chronic atrial fibrillation.  5. Chronic anticoagulation.    HISTORY OF PRESENT ILLNESS:  This 83-year-old female was sent from the nursing home because of non-traumatic epistaxis from her left nostril.  She had mild shortness of breath.  No chest pain.  She had a large bloody bowel or at least reported as bloody-type bowel movement in the emergency room.  It sounds like it was melenic, tested positive for blood.  It was discussed with GI, who thought likely that this is from swallowed epistaxis into the GI tract.  Current hemoglobin and hematocrit are stable at 11.5 and 36.8.  The patient is being admitted for further monitoring and evaluation.  Her complaints are none at this time.  She is known to the medical service here from prior admission.  She does have a history of dementia, chronic atrial fibrillation, and hypothyroidism.  She was recently in the hospital and placed on oxygen for pneumonia and cardiomyopathy.  She was discharged back to the nursing facility on midodrine and an antibiotic.  She has routine medications for her atrial fibrillation including Eliquis.  She takes Synthroid daily as well.  She is fairly debilitated at baseline and she has a do not resuscitate code status.    REVIEW OF SYSTEMS:   She is not able to give me review of systems.  She denies any pain currently, but that is essentially it.  She is otherwise pleasantly confused.    PAST SURGICAL HISTORY:  Includes hysterectomy and knee surgery.    FAMILY HISTORY:  There is no family history that I can obtain

## 2024-07-03 NOTE — PLAN OF CARE
Problem: Discharge Planning  Goal: Discharge to home or other facility with appropriate resources  Outcome: Progressing  Flowsheets (Taken 7/2/2024 1945)  Discharge to home or other facility with appropriate resources:   Identify barriers to discharge with patient and caregiver   Arrange for needed discharge resources and transportation as appropriate   Identify discharge learning needs (meds, wound care, etc)     Problem: Safety - Adult  Goal: Free from fall injury  Outcome: Progressing     Problem: Skin/Tissue Integrity  Goal: Absence of new skin breakdown  Description: 1.  Monitor for areas of redness and/or skin breakdown  2.  Assess vascular access sites hourly  3.  Every 4-6 hours minimum:  Change oxygen saturation probe site  4.  Every 4-6 hours:  If on nasal continuous positive airway pressure, respiratory therapy assess nares and determine need for appliance change or resting period.  Outcome: Progressing

## 2024-07-04 LAB
ANION GAP SERPL CALC-SCNC: 6 MMOL/L (ref 3–18)
BASOPHILS # BLD: 0 K/UL (ref 0–0.1)
BASOPHILS NFR BLD: 1 % (ref 0–2)
BUN SERPL-MCNC: 14 MG/DL (ref 7–18)
BUN/CREAT SERPL: 16 (ref 12–20)
CALCIUM SERPL-MCNC: 8.5 MG/DL (ref 8.5–10.1)
CHLORIDE SERPL-SCNC: 104 MMOL/L (ref 100–111)
CO2 SERPL-SCNC: 31 MMOL/L (ref 21–32)
CREAT SERPL-MCNC: 0.86 MG/DL (ref 0.6–1.3)
DIFFERENTIAL METHOD BLD: ABNORMAL
EOSINOPHIL # BLD: 0.5 K/UL (ref 0–0.4)
EOSINOPHIL NFR BLD: 7 % (ref 0–5)
ERYTHROCYTE [DISTWIDTH] IN BLOOD BY AUTOMATED COUNT: 14.4 % (ref 11.6–14.5)
GLUCOSE BLD STRIP.AUTO-MCNC: 112 MG/DL (ref 70–110)
GLUCOSE SERPL-MCNC: 97 MG/DL (ref 74–99)
HCT VFR BLD AUTO: 31.6 % (ref 35–45)
HCT VFR BLD AUTO: 32.3 % (ref 35–45)
HCT VFR BLD AUTO: 35.7 % (ref 35–45)
HGB BLD-MCNC: 10.9 G/DL (ref 12–16)
HGB BLD-MCNC: 9.7 G/DL (ref 12–16)
HGB BLD-MCNC: 9.9 G/DL (ref 12–16)
IMM GRANULOCYTES # BLD AUTO: 0 K/UL (ref 0–0.04)
IMM GRANULOCYTES NFR BLD AUTO: 0 % (ref 0–0.5)
INR PPP: 1.1 (ref 0.9–1.1)
LYMPHOCYTES # BLD: 0.8 K/UL (ref 0.9–3.6)
LYMPHOCYTES NFR BLD: 12 % (ref 21–52)
MCH RBC QN AUTO: 29.7 PG (ref 24–34)
MCHC RBC AUTO-ENTMCNC: 30.7 G/DL (ref 31–37)
MCV RBC AUTO: 96.6 FL (ref 78–100)
MONOCYTES # BLD: 0.5 K/UL (ref 0.05–1.2)
MONOCYTES NFR BLD: 8 % (ref 3–10)
NEUTS SEG # BLD: 4.7 K/UL (ref 1.8–8)
NEUTS SEG NFR BLD: 72 % (ref 40–73)
NRBC # BLD: 0 K/UL (ref 0–0.01)
NRBC BLD-RTO: 0 PER 100 WBC
PLATELET # BLD AUTO: 245 K/UL (ref 135–420)
PMV BLD AUTO: 10.7 FL (ref 9.2–11.8)
POTASSIUM SERPL-SCNC: 3.5 MMOL/L (ref 3.5–5.5)
PROTHROMBIN TIME: 14.3 SEC (ref 11.9–14.9)
RBC # BLD AUTO: 3.27 M/UL (ref 4.2–5.3)
SODIUM SERPL-SCNC: 141 MMOL/L (ref 136–145)
WBC # BLD AUTO: 6.5 K/UL (ref 4.6–13.2)

## 2024-07-04 PROCEDURE — 6370000000 HC RX 637 (ALT 250 FOR IP): Performed by: INTERNAL MEDICINE

## 2024-07-04 PROCEDURE — 6370000000 HC RX 637 (ALT 250 FOR IP): Performed by: HOSPITALIST

## 2024-07-04 PROCEDURE — 2700000000 HC OXYGEN THERAPY PER DAY

## 2024-07-04 PROCEDURE — 85025 COMPLETE CBC W/AUTO DIFF WBC: CPT

## 2024-07-04 PROCEDURE — 36415 COLL VENOUS BLD VENIPUNCTURE: CPT

## 2024-07-04 PROCEDURE — 82962 GLUCOSE BLOOD TEST: CPT

## 2024-07-04 PROCEDURE — 1100000003 HC PRIVATE W/ TELEMETRY

## 2024-07-04 PROCEDURE — 97162 PT EVAL MOD COMPLEX 30 MIN: CPT

## 2024-07-04 PROCEDURE — 85014 HEMATOCRIT: CPT

## 2024-07-04 PROCEDURE — 85018 HEMOGLOBIN: CPT

## 2024-07-04 PROCEDURE — 85610 PROTHROMBIN TIME: CPT

## 2024-07-04 PROCEDURE — 80048 BASIC METABOLIC PNL TOTAL CA: CPT

## 2024-07-04 RX ORDER — PANTOPRAZOLE SODIUM 40 MG/1
40 TABLET, DELAYED RELEASE ORAL
Status: DISCONTINUED | OUTPATIENT
Start: 2024-07-05 | End: 2024-07-05 | Stop reason: HOSPADM

## 2024-07-04 RX ORDER — METOPROLOL TARTRATE 50 MG/1
50 TABLET, FILM COATED ORAL 2 TIMES DAILY
Status: DISCONTINUED | OUTPATIENT
Start: 2024-07-04 | End: 2024-07-05 | Stop reason: HOSPADM

## 2024-07-04 RX ADMIN — CHOLECALCIFEROL (VITAMIN D3) 10 MCG (400 UNIT) TABLET 400 UNITS: at 09:08

## 2024-07-04 RX ADMIN — Medication 5 MG: at 20:56

## 2024-07-04 RX ADMIN — FUROSEMIDE 20 MG: 20 TABLET ORAL at 18:13

## 2024-07-04 RX ADMIN — LEVOTHYROXINE SODIUM 50 MCG: 0.05 TABLET ORAL at 09:08

## 2024-07-04 RX ADMIN — MIDODRINE HYDROCHLORIDE 2.5 MG: 2.5 TABLET ORAL at 09:07

## 2024-07-04 RX ADMIN — POTASSIUM CHLORIDE 20 MEQ: 1500 TABLET, EXTENDED RELEASE ORAL at 09:08

## 2024-07-04 RX ADMIN — Medication 400 MG: at 20:56

## 2024-07-04 RX ADMIN — MIDODRINE HYDROCHLORIDE 2.5 MG: 2.5 TABLET ORAL at 13:06

## 2024-07-04 RX ADMIN — AMIODARONE HYDROCHLORIDE 200 MG: 200 TABLET ORAL at 09:13

## 2024-07-04 RX ADMIN — SERTRALINE 50 MG: 50 TABLET, FILM COATED ORAL at 09:08

## 2024-07-04 RX ADMIN — FERROUS SULFATE TAB 325 MG (65 MG ELEMENTAL FE) 325 MG: 325 (65 FE) TAB at 09:09

## 2024-07-04 RX ADMIN — CYANOCOBALAMIN TAB 1000 MCG 1000 MCG: 1000 TAB at 09:08

## 2024-07-04 RX ADMIN — MIDODRINE HYDROCHLORIDE 2.5 MG: 2.5 TABLET ORAL at 18:13

## 2024-07-04 ASSESSMENT — PAIN SCALES - GENERAL: PAINLEVEL_OUTOF10: 0

## 2024-07-04 NOTE — PLAN OF CARE
Problem: Discharge Planning  Goal: Discharge to home or other facility with appropriate resources  7/4/2024 0114 by Michael Garcia RN  Outcome: Progressing  7/4/2024 0114 by Michael Garcia RN  Outcome: Progressing     Problem: Safety - Adult  Goal: Free from fall injury  7/4/2024 0114 by Michael Garcia RN  Outcome: Progressing  7/4/2024 0114 by Michael Garcia RN  Outcome: Progressing     Problem: Skin/Tissue Integrity  Goal: Absence of new skin breakdown  Description: 1.  Monitor for areas of redness and/or skin breakdown  2.  Assess vascular access sites hourly  3.  Every 4-6 hours minimum:  Change oxygen saturation probe site  4.  Every 4-6 hours:  If on nasal continuous positive airway pressure, respiratory therapy assess nares and determine need for appliance change or resting period.  7/4/2024 0114 by Michael Garcia RN  Outcome: Progressing  7/4/2024 0114 by Michael Garcia RN  Outcome: Progressing     Problem: Hematologic - Adult  Goal: Maintains hematologic stability  Outcome: Progressing

## 2024-07-04 NOTE — PROGRESS NOTES
Physical Therapy Goals:  Initiated 7/4/2024 to be met within 7-10 days.  Short Term Goals  Short Term Goal 1: Patient will perform rolling R/L with ModA  Short Term Goal 2: Patient will perform supine to/from sit with MaxA  Short Term Goal 3: Patient will perform sit to/from stand with MaxA in prep for gait training  Short Term Goal 4: Patient will perform bed to/from chair transfer with MaxA to progress OOB mobility  PHYSICAL THERAPY EVALUATION    Patient: Tiesha Harris (83 y.o. female)  Date: 7/4/2024  Diagnosis: GI bleed [K92.2] GI bleed  Precautions: Fall Risk  PLOF: Unknown    ASSESSMENT :  Patient received supine in bed. Pt presents with decreased LE strength, decreased activity tolerance, decreased bed mobility and transfers , impaired cognition, and impaired balance. Patient performed rolling with Sonja. Declined attempts at EOB mobility at this time. Patient unable to provide information on PLOF. Patient has an aircast boot in window ledge however pt unaware of boot or it's use. Per chart review of 6/1/2024 discharge summery there was mention of closed fx of distal tibia however unable locate date of injury or weight bearing recommendations. Patient appears below baseline level and would benefit from continued skilled PT to progress functional mobility. Recommend SNF at discharge.    DEFICITS/IMPAIRMENTS:    , Body Structures, Functions, Activity Limitations Requiring Skilled Therapeutic Intervention: Decreased functional mobility ;Decreased strength;Decreased endurance;Decreased balance;Increased pain    Patient will benefit from skilled intervention to address the above impairments.  Patient's rehabilitation potential/Therapy Prognosis: Fair.  Factors which may influence rehabilitation potential include:   []         None noted  [x]         Mental ability/status  []         Medical condition  []         Home/family situation and support systems  [x]         Safety awareness  []         Pain

## 2024-07-04 NOTE — PROGRESS NOTES
Pt given pills whole in pudding. Pt experienced nausea and vomiting immediately after meds were swallowed with pudding. Will crush pills going forward.

## 2024-07-04 NOTE — PLAN OF CARE
Problem: Discharge Planning  Goal: Discharge to home or other facility with appropriate resources  7/4/2024 1046 by Merced Reyes RN  Outcome: Progressing  Flowsheets (Taken 7/4/2024 0715)  Discharge to home or other facility with appropriate resources:   Identify barriers to discharge with patient and caregiver   Arrange for needed discharge resources and transportation as appropriate   Identify discharge learning needs (meds, wound care, etc)   Refer to discharge planning if patient needs post-hospital services based on physician order or complex needs related to functional status, cognitive ability or social support system  7/4/2024 0114 by Michael Garcia RN  Outcome: Progressing  7/4/2024 0114 by Michael Garcia RN  Outcome: Progressing     Problem: Safety - Adult  Goal: Free from fall injury  7/4/2024 1046 by Merced Reyes RN  Outcome: Progressing  7/4/2024 0114 by Michael Garcia RN  Outcome: Progressing  7/4/2024 0114 by Michael Garcia RN  Outcome: Progressing     Problem: Skin/Tissue Integrity  Goal: Absence of new skin breakdown  Description: 1.  Monitor for areas of redness and/or skin breakdown  2.  Assess vascular access sites hourly  3.  Every 4-6 hours minimum:  Change oxygen saturation probe site  4.  Every 4-6 hours:  If on nasal continuous positive airway pressure, respiratory therapy assess nares and determine need for appliance change or resting period.  7/4/2024 1046 by Merced Reyes, RN  Outcome: Progressing  7/4/2024 0114 by Michael Garcia RN  Outcome: Progressing  7/4/2024 0114 by Michael Garcia RN  Outcome: Progressing     Problem: Hematologic - Adult  Goal: Maintains hematologic stability  7/4/2024 1046 by Merced Reyes, RN  Outcome: Progressing  Flowsheets (Taken 7/4/2024 0715)  Maintains hematologic stability:   Assess for signs and symptoms of bleeding or hemorrhage   Monitor labs for bleeding or clotting disorders   Administer blood products/factors as ordered  7/4/2024

## 2024-07-04 NOTE — PROGRESS NOTES
Hospitalist Progress Note    Patient: Tiesha Harris MRN: [id number]  CSN: [id number]    YOB: 1940  Age: 83 y.o.  Sex: female    DOA: 7/2/2024 LOS:  LOS: 2 days          Chief Complaint:    bleeding      Assessment/Plan     1. Epistaxis. Resolved  Daughter states has been regular over past 2 months, has seen ENT for this, eliquis still in place at NH, she is on humidified 02 there also    2. Chronic atrial fibrillation. Hold metoprolol for now as BP low normal-AC  is eliquis at baseline, continue amiodarone    3. Possible GI bleed. Likely ingested blood from nosebleed-    continue H&H serially for 24 hrs, no further melena    4. Dementia. moderate    5. Hypothyroidism.    6. Depression.    Discussed with daughter Ms Villareal  We reviewed risks benefits of continued anticoagulation  Will reduce eliquis to lowest dose when resumed, resume if H&H stable and no bleeding by tomorrow then also d/c back to LTC    Continue her other meds as doing    DNR    Total time: 34 min  Counseling / coordination time:   > 50% counseling / coordination        Disposition :  Active Hospital Problems    Diagnosis     GI bleed [K92.2]     Epistaxis [R04.0]     Cardiomyopathy (HCC) [I42.9]     Chronic atrial fibrillation (HCC) [I48.20]     Dementia (HCC) [F03.90]     Pulmonary hypertension (HCC) [I27.20]        Subjective:    She is pleasantly confused  Thought she was going to get her hair done this am  Denies any pain, nausea, SOB  No bleeding reported      Review of systems:    Respiratory: denies SOB, cough  Cardiovascular: denies chest pain, palpitations  Gastrointestinal: denies nausea      Vital signs/Intake and Output:  Visit Vitals  BP 94/70   Pulse 87   Temp 97.3 °F (36.3 °C) (Temporal)   Resp 17   Wt 84.8 kg (187 lb)   SpO2 99%   BMI 29.29 kg/m²     Current Shift:  No intake/output data recorded.  Last three shifts:  07/02 1901 - 07/04 0700  In: 720 [P.O.:720]  Out: 940 [Urine:940]    Exam:    General: elderly WF

## 2024-07-05 VITALS
BODY MASS INDEX: 29.29 KG/M2 | SYSTOLIC BLOOD PRESSURE: 131 MMHG | WEIGHT: 187 LBS | RESPIRATION RATE: 18 BRPM | DIASTOLIC BLOOD PRESSURE: 69 MMHG | OXYGEN SATURATION: 98 % | HEART RATE: 119 BPM | TEMPERATURE: 98.4 F

## 2024-07-05 LAB
ANION GAP SERPL CALC-SCNC: 3 MMOL/L (ref 3–18)
BASOPHILS # BLD: 0 K/UL (ref 0–0.1)
BASOPHILS NFR BLD: 0 % (ref 0–2)
BUN SERPL-MCNC: 14 MG/DL (ref 7–18)
BUN/CREAT SERPL: 16 (ref 12–20)
CALCIUM SERPL-MCNC: 8.6 MG/DL (ref 8.5–10.1)
CHLORIDE SERPL-SCNC: 105 MMOL/L (ref 100–111)
CO2 SERPL-SCNC: 31 MMOL/L (ref 21–32)
CREAT SERPL-MCNC: 0.89 MG/DL (ref 0.6–1.3)
DIFFERENTIAL METHOD BLD: ABNORMAL
EOSINOPHIL # BLD: 0.4 K/UL (ref 0–0.4)
EOSINOPHIL NFR BLD: 6 % (ref 0–5)
ERYTHROCYTE [DISTWIDTH] IN BLOOD BY AUTOMATED COUNT: 14.2 % (ref 11.6–14.5)
GLUCOSE SERPL-MCNC: 96 MG/DL (ref 74–99)
HCT VFR BLD AUTO: 31.5 % (ref 35–45)
HGB BLD-MCNC: 9.8 G/DL (ref 12–16)
IMM GRANULOCYTES # BLD AUTO: 0 K/UL (ref 0–0.04)
IMM GRANULOCYTES NFR BLD AUTO: 0 % (ref 0–0.5)
LYMPHOCYTES # BLD: 0.8 K/UL (ref 0.9–3.6)
LYMPHOCYTES NFR BLD: 13 % (ref 21–52)
MCH RBC QN AUTO: 29.9 PG (ref 24–34)
MCHC RBC AUTO-ENTMCNC: 31.1 G/DL (ref 31–37)
MCV RBC AUTO: 96 FL (ref 78–100)
MONOCYTES # BLD: 0.6 K/UL (ref 0.05–1.2)
MONOCYTES NFR BLD: 9 % (ref 3–10)
NEUTS SEG # BLD: 4.6 K/UL (ref 1.8–8)
NEUTS SEG NFR BLD: 71 % (ref 40–73)
NRBC # BLD: 0 K/UL (ref 0–0.01)
NRBC BLD-RTO: 0 PER 100 WBC
PLATELET # BLD AUTO: 242 K/UL (ref 135–420)
PMV BLD AUTO: 10.5 FL (ref 9.2–11.8)
POTASSIUM SERPL-SCNC: 3.5 MMOL/L (ref 3.5–5.5)
RBC # BLD AUTO: 3.28 M/UL (ref 4.2–5.3)
SODIUM SERPL-SCNC: 139 MMOL/L (ref 136–145)
WBC # BLD AUTO: 6.4 K/UL (ref 4.6–13.2)

## 2024-07-05 PROCEDURE — 80048 BASIC METABOLIC PNL TOTAL CA: CPT

## 2024-07-05 PROCEDURE — 85025 COMPLETE CBC W/AUTO DIFF WBC: CPT

## 2024-07-05 PROCEDURE — 2700000000 HC OXYGEN THERAPY PER DAY

## 2024-07-05 PROCEDURE — 6370000000 HC RX 637 (ALT 250 FOR IP): Performed by: HOSPITALIST

## 2024-07-05 PROCEDURE — 36415 COLL VENOUS BLD VENIPUNCTURE: CPT

## 2024-07-05 RX ORDER — BACITRACIN ZINC AND POLYMYXIN B SULFATE 500; 10000 [USP'U]/G; [USP'U]/G
OINTMENT OPHTHALMIC 2 TIMES DAILY
Status: DISCONTINUED | OUTPATIENT
Start: 2024-07-05 | End: 2024-07-05 | Stop reason: HOSPADM

## 2024-07-05 RX ORDER — BACITRACIN ZINC AND POLYMYXIN B SULFATE 500; 10000 [USP'U]/G; [USP'U]/G
OINTMENT OPHTHALMIC
Qty: 3.5 G | Refills: 0
Start: 2024-07-05

## 2024-07-05 RX ADMIN — CHOLECALCIFEROL (VITAMIN D3) 10 MCG (400 UNIT) TABLET 400 UNITS: at 09:22

## 2024-07-05 RX ADMIN — POTASSIUM CHLORIDE 20 MEQ: 1500 TABLET, EXTENDED RELEASE ORAL at 09:23

## 2024-07-05 RX ADMIN — BACITRACIN ZINC AND POLYMYXIN B SULFATE: 500; 10000 OINTMENT OPHTHALMIC at 12:40

## 2024-07-05 RX ADMIN — MIDODRINE HYDROCHLORIDE 2.5 MG: 2.5 TABLET ORAL at 09:23

## 2024-07-05 RX ADMIN — MIDODRINE HYDROCHLORIDE 2.5 MG: 2.5 TABLET ORAL at 12:40

## 2024-07-05 RX ADMIN — FUROSEMIDE 20 MG: 20 TABLET ORAL at 09:23

## 2024-07-05 RX ADMIN — CYANOCOBALAMIN TAB 1000 MCG 1000 MCG: 1000 TAB at 09:22

## 2024-07-05 RX ADMIN — FERROUS SULFATE TAB 325 MG (65 MG ELEMENTAL FE) 325 MG: 325 (65 FE) TAB at 09:22

## 2024-07-05 RX ADMIN — AMIODARONE HYDROCHLORIDE 200 MG: 200 TABLET ORAL at 09:24

## 2024-07-05 RX ADMIN — LEVOTHYROXINE SODIUM 50 MCG: 0.05 TABLET ORAL at 09:23

## 2024-07-05 RX ADMIN — SERTRALINE 50 MG: 50 TABLET, FILM COATED ORAL at 09:22

## 2024-07-05 NOTE — PLAN OF CARE
Problem: Discharge Planning  Goal: Discharge to home or other facility with appropriate resources  7/5/2024 1840 by Merced Reyes RN  Outcome: Adequate for Discharge  7/5/2024 1840 by Merced Reyes RN  Outcome: Adequate for Discharge  7/5/2024 1840 by Merced Reyes RN  Outcome: Progressing  7/5/2024 1310 by Merced Reyes RN  Outcome: Progressing  Flowsheets (Taken 7/5/2024 0710)  Discharge to home or other facility with appropriate resources:   Identify barriers to discharge with patient and caregiver   Arrange for needed discharge resources and transportation as appropriate   Identify discharge learning needs (meds, wound care, etc)   Refer to discharge planning if patient needs post-hospital services based on physician order or complex needs related to functional status, cognitive ability or social support system     Problem: Safety - Adult  Goal: Free from fall injury  7/5/2024 1840 by Merced Reyes RN  Outcome: Adequate for Discharge  7/5/2024 1840 by Merced Reyes RN  Outcome: Adequate for Discharge  7/5/2024 1840 by Merced Reyes RN  Outcome: Progressing  7/5/2024 1310 by Merced Reyes RN  Outcome: Progressing     Problem: Skin/Tissue Integrity  Goal: Absence of new skin breakdown  Description: 1.  Monitor for areas of redness and/or skin breakdown  2.  Assess vascular access sites hourly  3.  Every 4-6 hours minimum:  Change oxygen saturation probe site  4.  Every 4-6 hours:  If on nasal continuous positive airway pressure, respiratory therapy assess nares and determine need for appliance change or resting period.  7/5/2024 1840 by Merced Reyes RN  Outcome: Adequate for Discharge  7/5/2024 1840 by Merced Reyes RN  Outcome: Adequate for Discharge  7/5/2024 1840 by Merced Reyes RN  Outcome: Progressing  7/5/2024 1310 by Merced Reyes RN  Outcome: Progressing     Problem: Hematologic - Adult  Goal: Maintains hematologic stability  7/5/2024 1840 by Merced Reyes RN  Outcome: Adequate for

## 2024-07-05 NOTE — PROGRESS NOTES
[facility]       Address: [address] , [place] [address]    Phone: [phone]      Plan is to DC to SNF today at 330pm. SNF aware of return. DC summary to follow. Transport arranged by [facility] via stretcher.

## 2024-07-05 NOTE — DISCHARGE SUMMARY
[address]  [address]                            DISCHARGE SUMMARY      PATIENT NAME: SILAS JOHNSON                : 1940  MED REC NO: [id number]                       ROOM: [id number]  ACCOUNT NO: [id number]                       ADMIT DATE: 2024  PROVIDER: Aneta Landry MD    DISCHARGE DATE:  2024    ATTENDING PHYSICIAN:  ANETA LANDRY    DIAGNOSES AT DISCHARGE:  Include:  1. Epistaxis.  2. Anticoagulation for chronic atrial fibrillation.  3. Melenic stool, likely due to ingested epistaxis.  4. Dementia.  5. Chronic oxygen use.  6. Hypothyroidism.  7. Depression.  8. Debility and weakness.    HOSPITAL SUMMARY:  This 83-year-old female, who is on Eliquis for atrial fibrillation, came into the emergency room from the nursing facility for epistaxis.  Her daughter states she has had a few episodes of this over the past 2 months, has been to another hospital, has seen ENT for this.  They could not find a clear source of the bleeding.  She does wear oxygen that is humidified at the nursing facility and she is still on Eliquis, but this time, she had the epistaxis and then she passed some melena-like stool in the emergency room, so they were concerned about a GI bleed.  They did consult with GI, who recommended it was likely due to the ingested blood, but to monitor her blood counts, and she has had an uneventful stay in the hospital otherwise.  She does have a small stye on the right upper eyelid with some inflammation.  We are going to add some antibiotic ointment to the eyelids for that, but otherwise she is clinically at her baseline.  Metabolic panel today is within normal limits.  Hemoglobin stable at 9.8 and 31.5, baseline appears to be 10.5 to 11 hemoglobin, so some mild decrease since the bleeding, but no acute decreasing nature happening.  Coags are within normal limits with an INR of 1.1 and glucose is 96.  The patient

## 2024-07-05 NOTE — PLAN OF CARE
Problem: Discharge Planning  Goal: Discharge to home or other facility with appropriate resources  7/5/2024 0040 by Michael Garcia RN  Outcome: Progressing  7/4/2024 1046 by Merced Reyes RN  Outcome: Progressing  Flowsheets (Taken 7/4/2024 0715)  Discharge to home or other facility with appropriate resources:   Identify barriers to discharge with patient and caregiver   Arrange for needed discharge resources and transportation as appropriate   Identify discharge learning needs (meds, wound care, etc)   Refer to discharge planning if patient needs post-hospital services based on physician order or complex needs related to functional status, cognitive ability or social support system     Problem: Safety - Adult  Goal: Free from fall injury  7/5/2024 0040 by Michael Garcia RN  Outcome: Progressing  7/4/2024 1046 by Merced Reyes RN  Outcome: Progressing     Problem: Skin/Tissue Integrity  Goal: Absence of new skin breakdown  Description: 1.  Monitor for areas of redness and/or skin breakdown  2.  Assess vascular access sites hourly  3.  Every 4-6 hours minimum:  Change oxygen saturation probe site  4.  Every 4-6 hours:  If on nasal continuous positive airway pressure, respiratory therapy assess nares and determine need for appliance change or resting period.  7/5/2024 0040 by Michael Garcia RN  Outcome: Progressing  7/4/2024 1046 by Merced Reyes RN  Outcome: Progressing     Problem: Hematologic - Adult  Goal: Maintains hematologic stability  7/5/2024 0040 by Michael Garcia RN  Outcome: Progressing  7/4/2024 1046 by Merced Reyes RN  Outcome: Progressing  Flowsheets (Taken 7/4/2024 0715)  Maintains hematologic stability:   Assess for signs and symptoms of bleeding or hemorrhage   Monitor labs for bleeding or clotting disorders   Administer blood products/factors as ordered

## 2024-07-05 NOTE — PLAN OF CARE
Problem: Discharge Planning  Goal: Discharge to home or other facility with appropriate resources  7/5/2024 1840 by Merced Reyes RN  Outcome: Progressing  7/5/2024 1310 by Merced Reyes RN  Outcome: Progressing  Flowsheets (Taken 7/5/2024 0710)  Discharge to home or other facility with appropriate resources:   Identify barriers to discharge with patient and caregiver   Arrange for needed discharge resources and transportation as appropriate   Identify discharge learning needs (meds, wound care, etc)   Refer to discharge planning if patient needs post-hospital services based on physician order or complex needs related to functional status, cognitive ability or social support system     Problem: Safety - Adult  Goal: Free from fall injury  7/5/2024 1840 by Merced Reyes RN  Outcome: Progressing  7/5/2024 1310 by Merced Reyes RN  Outcome: Progressing     Problem: Skin/Tissue Integrity  Goal: Absence of new skin breakdown  Description: 1.  Monitor for areas of redness and/or skin breakdown  2.  Assess vascular access sites hourly  3.  Every 4-6 hours minimum:  Change oxygen saturation probe site  4.  Every 4-6 hours:  If on nasal continuous positive airway pressure, respiratory therapy assess nares and determine need for appliance change or resting period.  7/5/2024 1840 by Merced Reyes RN  Outcome: Progressing  7/5/2024 1310 by Merced Reyes RN  Outcome: Progressing     Problem: Hematologic - Adult  Goal: Maintains hematologic stability  7/5/2024 1840 by Merced Reyes RN  Outcome: Progressing  7/5/2024 1310 by Merced Reyes RN  Outcome: Progressing  Flowsheets (Taken 7/5/2024 0710)  Maintains hematologic stability:   Assess for signs and symptoms of bleeding or hemorrhage   Monitor labs for bleeding or clotting disorders   Administer blood products/factors as ordered     Problem: Chronic Conditions and Co-morbidities  Goal: Patient's chronic conditions and co-morbidity symptoms are monitored and maintained  or improved  7/5/2024 1840 by Merced Reyes, RN  Outcome: Progressing  7/5/2024 1310 by Merced Reyes RN  Outcome: Progressing  Flowsheets (Taken 7/5/2024 0710)  Care Plan - Patient's Chronic Conditions and Co-Morbidity Symptoms are Monitored and Maintained or Improved:   Monitor and assess patient's chronic conditions and comorbid symptoms for stability, deterioration, or improvement   Collaborate with multidisciplinary team to address chronic and comorbid conditions and prevent exacerbation or deterioration   Update acute care plan with appropriate goals if chronic or comorbid symptoms are exacerbated and prevent overall improvement and discharge

## 2024-07-05 NOTE — PLAN OF CARE
Problem: Discharge Planning  Goal: Discharge to home or other facility with appropriate resources  7/5/2024 1310 by Merced Reyes RN  Outcome: Progressing  7/5/2024 0040 by Michael Garcia RN  Outcome: Progressing     Problem: Safety - Adult  Goal: Free from fall injury  7/5/2024 1310 by Merced Reyes RN  Outcome: Progressing  7/5/2024 0040 by Michael Garcia RN  Outcome: Progressing     Problem: Skin/Tissue Integrity  Goal: Absence of new skin breakdown  Description: 1.  Monitor for areas of redness and/or skin breakdown  2.  Assess vascular access sites hourly  3.  Every 4-6 hours minimum:  Change oxygen saturation probe site  4.  Every 4-6 hours:  If on nasal continuous positive airway pressure, respiratory therapy assess nares and determine need for appliance change or resting period.  7/5/2024 1310 by Merced Reyes RN  Outcome: Progressing  7/5/2024 0040 by Michael Garcia RN  Outcome: Progressing     Problem: Hematologic - Adult  Goal: Maintains hematologic stability  7/5/2024 1310 by Merced Reyes RN  Outcome: Progressing  7/5/2024 0040 by Michael Garcia RN  Outcome: Progressing     Problem: Chronic Conditions and Co-morbidities  Goal: Patient's chronic conditions and co-morbidity symptoms are monitored and maintained or improved  Outcome: Progressing

## 2024-07-05 NOTE — CARE COORDINATION
07/05/24 0948   IMM Letter   IMM Letter given to Patient/Family/Significant other/Guardian/POA/by: Dina Gonzalez   IMM Letter date given: 07/05/24   IMM Letter time given: 0945     Reviewed IMM with patient's daughter Uyen Villareal ([facility]) over the phone (patient has dementia), daughter verbalized understanding and in agreement with discharge.  IMM placed in discharge packet to return to [facility], per daughter's request.

## 2024-07-11 NOTE — PROGRESS NOTES
Physician Progress Note      PATIENT:               SILAS JOHNSON  CSN #:                  [id number]  :                       1940  ADMIT DATE:       2024 10:26 AM  DISCH DATE:        2024 4:02 PM  RESPONDING  PROVIDER #:        Rhonda Yoo MD          QUERY TEXT:    Pt admitted with epistaxis.  Pt noted to have sats 87 with RR 27 in ED. If   possible, please document in the progress notes and discharge summary if you   are evaluating and/or treating any of the following:    The medical record reflects the following:  Risk Factors: acute illness, advanced age  Clinical Indicators: admitted with epistaxis  - noted to have sats 87 in ED with RR 27  - placed on O2 support- NRB and 4L  Treatment: O2 support (NRB, 4L O2), monitoring    Thank you,    Bibi Solano RN  CDI  Options provided:  -- Acute respiratory failure with hypoxia  -- Acute hypoxia  -- Other - I will add my own diagnosis  -- Disagree - Not applicable / Not valid  -- Disagree - Clinically unable to determine / Unknown  -- Refer to Clinical Documentation Reviewer    PROVIDER RESPONSE TEXT:    This patient has acute hypoxia.    Query created by: Bibi Solano on 7/10/2024 8:10 AM      Electronically signed by:  Rhonda Yoo MD 2024 6:59 AM

## 2025-01-16 NOTE — PROGRESS NOTES
Bariatric Post-Op Follow Up Appointment: 3 mos VSG     Current Weight: 217 lbs   Current BMI: 33.49  Percentage of weight loss achieved: 56    Dietary Intake: consuming something every ~2 hours. TJ reports he was unsure if he was getting enough nutrients.   Do you drink with your meals? No   Current Exercise: walking for an hour in the evening   Vitamins/minerals: 1 tablet Flintstones MVI daily. We reviewed vitamins and I recommended that TJ switch to taking the Celebrate 18 MVI 1x/day plus calcium citrate 2x/day.   Food intolerances? Trouble recently with pulled pork  Any decrease in energy levels? No   Any questions or concerns? Addressed pt's dietary questions related to the post-op diet progression. We also reviewed a protein goal and to eliminate snacks if not needed.       Patricia Anderson RD, LDN  Registered Dietitian, Licensed Dietitian Nutritionist   [facility]  Pulmonary, Critical Care, and Sleep Medicine    Name: Libra Velasco MRN: [id number]   : 1940 Hospital: [facility]    Date: 2023  Room: [address] [place] [address] Note                                                                             Consult requesting physician: Daniela Nava MD  Reason for Consult: Acute respiratory failure    IMPRESSION:     Acute respiratory failure with hypoxia  Bilateral pneumonia-treat as HCAP  Chronic atrial fibrillation  Chronic heart failure    Patient Active Problem List   Diagnosis Code    Acute hypoxemic respiratory failure (HCC) J96.01    Bilateral pneumonia J18.9    Chronic atrial fibrillation (HCC) I48.20    Acute on chronic congestive heart failure (HCC) I50.9    Dementia ([address]) F03.90    Pulmonary hypertension (HCC) I27.20    UTI (urinary tract infection) N39.0    Atrial fibrillation with RVR ([address]) I48.91    Sustained ventricular tachycardia ([facility]) I47.20       Code status: DNR       RECOMMENDATIONS:     Respiratory: Patient appears to have combination of pneumonia and CHF, with history of pulm hypertension. Patient not needing BiPAP anymore. Nasal cannula oxygen-currently at 5 L; recommend incentive spirometry and wean FiO2. Bronchodilators: Continue budesonide nebs twice daily, ipratropium nebs 4 times daily. Avoid LABA due to A-fib. Steroids: Not indicated. O2: Continue 5 LPM O2 NS; titrate to keep SpO2 more than 91%. Antibiotics as below. CT chest images 23 reviewed-right-sided multilobar pneumonia with right lower lobe consolidation; small right basal effusion; no central or segmental PEs reported; cardiomegaly with pulmonary hypertension findings. CXR 2023: Mildly improved bilateral pulmonary infiltrates. Bilateral lower lobe linear atelectasis. No significant pleural effusion. Still not using incentive spirometer regularly; advised to use frequently.   She has not received Acapella Detected results do not preclude SARS-CoV-2 infection and should not be used as the sole basis for patient management decisions. Results must be combined with clinical observations, patient history, and epidemiological information. Rapid Influenza A By PCR Not detected        Rapid Influenza B By PCR Not detected        Comment: Testing was performed using kelsie Kiley SARS-CoV-2 and Influenza A/B nucleic acid assay. This test is a multiplex Real-Time Reverse Transcriptase Polymerase Chain Reaction (RT-PCR) based in vitro diagnostic test intended for the qualitative detection of nucleic acids from SARS-CoV-2, Influenza A, and Influenza B in nasopharyngeal for use under the FDA's Emergency Use Authorization(EAU) only.      Fact sheet for Patients: FindDrives.pl  Fact sheet for Healthcare Providers: FindDrives.pl         Legionella antigen, urine [[id number]] Collected: 11/25/23 0740    Order Status: Completed Specimen: Urine, random Updated: 11/25/23 2124     Legionella Antigen, Urine Negative       Strep Pneumoniae Antigen [[id number]] Collected: 11/25/23 0740    Order Status: Completed Specimen: Urine, random Updated: 11/25/23 2124     STREP PNEUMONIAE ANTIGEN, URINE Negative       Culture, Urine [[id number]]  (Abnormal) Collected: 11/25/23 0740    Order Status: Completed Specimen: Urine, clean catch Updated: 11/27/23 0640     Special Requests NO SPECIAL REQUESTS        [place] count --        >100,000  COLONIES/mL       Culture Gram negative rods       Blood Culture 2 [[id number]] Collected: 11/25/23 0734    Order Status: Completed Specimen: Blood Updated: 11/28/23 0838     Special Requests NO SPECIAL REQUESTS        Culture NO GROWTH 3 DAYS       Blood Culture 1 [[id number]] Collected: 11/25/23 0648    Order Status: Completed Specimen: Blood Updated: 11/28/23 0838     Special Requests NO SPECIAL REQUESTS        Culture NO GROWTH 3 DAYS the sinus of Valsalva is normal measuring 3.16 cm with an index of 1.4. * There is mild mitral valve regurgitation. * There is mild tricuspid valve regurgitation. * Mild pulmonary hypertension, estimated pulmonary arterial systolic pressure is 49 mmHg. Comparison     * Compared to prior study from 02/27/2022 EF was 71% with normal RV and RA size. PAP was 30 mmHg. Please note: Voice-recognition software may have been used to generate this report, which may have resulted in some phonetic-based errors in grammar and contents. Even though attempts were made to correct all the mistakes, some may have been missed, and remained in the body of the document.     Danni Marrero MD  11/28/2023